# Patient Record
Sex: FEMALE | Race: WHITE | NOT HISPANIC OR LATINO | URBAN - METROPOLITAN AREA
[De-identification: names, ages, dates, MRNs, and addresses within clinical notes are randomized per-mention and may not be internally consistent; named-entity substitution may affect disease eponyms.]

---

## 2022-03-24 ENCOUNTER — INPATIENT (INPATIENT)
Facility: HOSPITAL | Age: 67
LOS: 2 days | Discharge: HOME | DRG: 87 | End: 2022-03-27
Attending: SURGERY | Admitting: SURGERY
Payer: MEDICARE

## 2022-03-24 VITALS
OXYGEN SATURATION: 98 % | HEIGHT: 68 IN | SYSTOLIC BLOOD PRESSURE: 126 MMHG | RESPIRATION RATE: 20 BRPM | TEMPERATURE: 99 F | WEIGHT: 169.76 LBS | HEART RATE: 58 BPM | DIASTOLIC BLOOD PRESSURE: 76 MMHG

## 2022-03-24 LAB
ALBUMIN SERPL ELPH-MCNC: 3.9 G/DL — SIGNIFICANT CHANGE UP (ref 3.3–5.2)
ALP SERPL-CCNC: 81 U/L — SIGNIFICANT CHANGE UP (ref 40–120)
ALT FLD-CCNC: 48 U/L — HIGH
ANION GAP SERPL CALC-SCNC: 16 MMOL/L — SIGNIFICANT CHANGE UP (ref 5–17)
APTT BLD: 30.5 SEC — SIGNIFICANT CHANGE UP (ref 27.5–35.5)
AST SERPL-CCNC: 41 U/L — HIGH
BASOPHILS # BLD AUTO: 0.04 K/UL — SIGNIFICANT CHANGE UP (ref 0–0.2)
BASOPHILS NFR BLD AUTO: 0.4 % — SIGNIFICANT CHANGE UP (ref 0–2)
BILIRUB SERPL-MCNC: <0.2 MG/DL — LOW (ref 0.4–2)
BUN SERPL-MCNC: 18.4 MG/DL — SIGNIFICANT CHANGE UP (ref 8–20)
CALCIUM SERPL-MCNC: 9.2 MG/DL — SIGNIFICANT CHANGE UP (ref 8.6–10.2)
CHLORIDE SERPL-SCNC: 104 MMOL/L — SIGNIFICANT CHANGE UP (ref 98–107)
CO2 SERPL-SCNC: 21 MMOL/L — LOW (ref 22–29)
CREAT SERPL-MCNC: 0.61 MG/DL — SIGNIFICANT CHANGE UP (ref 0.5–1.3)
EGFR: 97 ML/MIN/1.73M2 — SIGNIFICANT CHANGE UP
EOSINOPHIL # BLD AUTO: 0.53 K/UL — HIGH (ref 0–0.5)
EOSINOPHIL NFR BLD AUTO: 5.7 % — SIGNIFICANT CHANGE UP (ref 0–6)
ETHANOL SERPL-MCNC: 66 MG/DL — HIGH (ref 0–9)
GLUCOSE SERPL-MCNC: 116 MG/DL — HIGH (ref 70–99)
HCT VFR BLD CALC: 40.1 % — SIGNIFICANT CHANGE UP (ref 34.5–45)
HGB BLD-MCNC: 12.8 G/DL — SIGNIFICANT CHANGE UP (ref 11.5–15.5)
IMM GRANULOCYTES NFR BLD AUTO: 1.5 % — SIGNIFICANT CHANGE UP (ref 0–1.5)
INR BLD: 1.07 RATIO — SIGNIFICANT CHANGE UP (ref 0.88–1.16)
LACTATE SERPL-SCNC: 2.4 MMOL/L — HIGH (ref 0.5–2)
LIDOCAIN IGE QN: 54 U/L — HIGH (ref 22–51)
LYMPHOCYTES # BLD AUTO: 2.02 K/UL — SIGNIFICANT CHANGE UP (ref 1–3.3)
LYMPHOCYTES # BLD AUTO: 21.8 % — SIGNIFICANT CHANGE UP (ref 13–44)
MCHC RBC-ENTMCNC: 30.9 PG — SIGNIFICANT CHANGE UP (ref 27–34)
MCHC RBC-ENTMCNC: 31.9 GM/DL — LOW (ref 32–36)
MCV RBC AUTO: 96.9 FL — SIGNIFICANT CHANGE UP (ref 80–100)
MONOCYTES # BLD AUTO: 0.65 K/UL — SIGNIFICANT CHANGE UP (ref 0–0.9)
MONOCYTES NFR BLD AUTO: 7 % — SIGNIFICANT CHANGE UP (ref 2–14)
NEUTROPHILS # BLD AUTO: 5.89 K/UL — SIGNIFICANT CHANGE UP (ref 1.8–7.4)
NEUTROPHILS NFR BLD AUTO: 63.6 % — SIGNIFICANT CHANGE UP (ref 43–77)
PLATELET # BLD AUTO: 253 K/UL — SIGNIFICANT CHANGE UP (ref 150–400)
POTASSIUM SERPL-MCNC: 4 MMOL/L — SIGNIFICANT CHANGE UP (ref 3.5–5.3)
POTASSIUM SERPL-SCNC: 4 MMOL/L — SIGNIFICANT CHANGE UP (ref 3.5–5.3)
PROT SERPL-MCNC: 7 G/DL — SIGNIFICANT CHANGE UP (ref 6.6–8.7)
PROTHROM AB SERPL-ACNC: 12.4 SEC — SIGNIFICANT CHANGE UP (ref 10.5–13.4)
RBC # BLD: 4.14 M/UL — SIGNIFICANT CHANGE UP (ref 3.8–5.2)
RBC # FLD: 13.4 % — SIGNIFICANT CHANGE UP (ref 10.3–14.5)
SODIUM SERPL-SCNC: 141 MMOL/L — SIGNIFICANT CHANGE UP (ref 135–145)
WBC # BLD: 9.27 K/UL — SIGNIFICANT CHANGE UP (ref 3.8–10.5)
WBC # FLD AUTO: 9.27 K/UL — SIGNIFICANT CHANGE UP (ref 3.8–10.5)

## 2022-03-24 PROCEDURE — 71045 X-RAY EXAM CHEST 1 VIEW: CPT | Mod: 26

## 2022-03-24 PROCEDURE — 74177 CT ABD & PELVIS W/CONTRAST: CPT | Mod: 26,MA

## 2022-03-24 PROCEDURE — 70450 CT HEAD/BRAIN W/O DYE: CPT | Mod: 26,MA

## 2022-03-24 PROCEDURE — 71260 CT THORAX DX C+: CPT | Mod: 26,MA

## 2022-03-24 PROCEDURE — 99285 EMERGENCY DEPT VISIT HI MDM: CPT

## 2022-03-24 PROCEDURE — 72170 X-RAY EXAM OF PELVIS: CPT | Mod: 26

## 2022-03-24 PROCEDURE — 72125 CT NECK SPINE W/O DYE: CPT | Mod: 26,MA

## 2022-03-24 RX ORDER — CEFAZOLIN SODIUM 1 G
2000 VIAL (EA) INJECTION ONCE
Refills: 0 | Status: COMPLETED | OUTPATIENT
Start: 2022-03-24 | End: 2022-03-24

## 2022-03-24 RX ORDER — SODIUM CHLORIDE 9 MG/ML
250 INJECTION INTRAMUSCULAR; INTRAVENOUS; SUBCUTANEOUS ONCE
Refills: 0 | Status: COMPLETED | OUTPATIENT
Start: 2022-03-24 | End: 2022-03-24

## 2022-03-24 RX ORDER — TETANUS TOXOID, REDUCED DIPHTHERIA TOXOID AND ACELLULAR PERTUSSIS VACCINE, ADSORBED 5; 2.5; 8; 8; 2.5 [IU]/.5ML; [IU]/.5ML; UG/.5ML; UG/.5ML; UG/.5ML
0.5 SUSPENSION INTRAMUSCULAR ONCE
Refills: 0 | Status: COMPLETED | OUTPATIENT
Start: 2022-03-24 | End: 2022-03-24

## 2022-03-24 RX ADMIN — TETANUS TOXOID, REDUCED DIPHTHERIA TOXOID AND ACELLULAR PERTUSSIS VACCINE, ADSORBED 0.5 MILLILITER(S): 5; 2.5; 8; 8; 2.5 SUSPENSION INTRAMUSCULAR at 23:00

## 2022-03-24 RX ADMIN — Medication 100 MILLIGRAM(S): at 22:42

## 2022-03-24 NOTE — ED ADULT NURSE NOTE - NS ED NURSE REPORT GIVEN TO FT
"I drop a wooden plank on my foot,  I went to an urgent care and they said my toe is broken" DONNA Whalen

## 2022-03-24 NOTE — ED ADULT TRIAGE NOTE - CHIEF COMPLAINT QUOTE
Patient BIBEMS s/p trip and fall down a flight of stairs this evening. Patient did not lose consciousness, GCS 15 at this time. ETOH use this evening. Code trauma B activated.

## 2022-03-25 DIAGNOSIS — Z98.890 OTHER SPECIFIED POSTPROCEDURAL STATES: Chronic | ICD-10-CM

## 2022-03-25 DIAGNOSIS — S06.6X9A TRAUMATIC SUBARACHNOID HEMORRHAGE WITH LOSS OF CONSCIOUSNESS OF UNSPECIFIED DURATION, INITIAL ENCOUNTER: ICD-10-CM

## 2022-03-25 LAB
ANION GAP SERPL CALC-SCNC: 13 MMOL/L — SIGNIFICANT CHANGE UP (ref 5–17)
APPEARANCE UR: CLEAR — SIGNIFICANT CHANGE UP
BILIRUB UR-MCNC: NEGATIVE — SIGNIFICANT CHANGE UP
BLD GP AB SCN SERPL QL: SIGNIFICANT CHANGE UP
BUN SERPL-MCNC: 17.4 MG/DL — SIGNIFICANT CHANGE UP (ref 8–20)
CALCIUM SERPL-MCNC: 8.1 MG/DL — LOW (ref 8.6–10.2)
CHLORIDE SERPL-SCNC: 105 MMOL/L — SIGNIFICANT CHANGE UP (ref 98–107)
CO2 SERPL-SCNC: 24 MMOL/L — SIGNIFICANT CHANGE UP (ref 22–29)
COLOR SPEC: YELLOW — SIGNIFICANT CHANGE UP
CREAT SERPL-MCNC: 0.58 MG/DL — SIGNIFICANT CHANGE UP (ref 0.5–1.3)
DIFF PNL FLD: NEGATIVE — SIGNIFICANT CHANGE UP
EGFR: 99 ML/MIN/1.73M2 — SIGNIFICANT CHANGE UP
GLUCOSE BLDC GLUCOMTR-MCNC: 128 MG/DL — HIGH (ref 70–99)
GLUCOSE SERPL-MCNC: 169 MG/DL — HIGH (ref 70–99)
GLUCOSE UR QL: 50 MG/DL
HCT VFR BLD CALC: 29 % — LOW (ref 34.5–45)
HCT VFR BLD CALC: 31.9 % — LOW (ref 34.5–45)
HGB BLD-MCNC: 10.2 G/DL — LOW (ref 11.5–15.5)
HGB BLD-MCNC: 9.4 G/DL — LOW (ref 11.5–15.5)
KETONES UR-MCNC: NEGATIVE — SIGNIFICANT CHANGE UP
LACTATE SERPL-SCNC: 2 MMOL/L — SIGNIFICANT CHANGE UP (ref 0.5–2)
LEUKOCYTE ESTERASE UR-ACNC: NEGATIVE — SIGNIFICANT CHANGE UP
MAGNESIUM SERPL-MCNC: 1.6 MG/DL — SIGNIFICANT CHANGE UP (ref 1.6–2.6)
MCHC RBC-ENTMCNC: 30.9 PG — SIGNIFICANT CHANGE UP (ref 27–34)
MCHC RBC-ENTMCNC: 32 GM/DL — SIGNIFICANT CHANGE UP (ref 32–36)
MCV RBC AUTO: 96.7 FL — SIGNIFICANT CHANGE UP (ref 80–100)
MRSA PCR RESULT.: SIGNIFICANT CHANGE UP
NITRITE UR-MCNC: NEGATIVE — SIGNIFICANT CHANGE UP
PH UR: 6 — SIGNIFICANT CHANGE UP (ref 5–8)
PHOSPHATE SERPL-MCNC: 2.6 MG/DL — SIGNIFICANT CHANGE UP (ref 2.4–4.7)
PLATELET # BLD AUTO: 249 K/UL — SIGNIFICANT CHANGE UP (ref 150–400)
POTASSIUM SERPL-MCNC: 3.7 MMOL/L — SIGNIFICANT CHANGE UP (ref 3.5–5.3)
POTASSIUM SERPL-SCNC: 3.7 MMOL/L — SIGNIFICANT CHANGE UP (ref 3.5–5.3)
PROT UR-MCNC: NEGATIVE — SIGNIFICANT CHANGE UP
RBC # BLD: 3.3 M/UL — LOW (ref 3.8–5.2)
RBC # FLD: 13.6 % — SIGNIFICANT CHANGE UP (ref 10.3–14.5)
S AUREUS DNA NOSE QL NAA+PROBE: SIGNIFICANT CHANGE UP
SARS-COV-2 RNA SPEC QL NAA+PROBE: SIGNIFICANT CHANGE UP
SODIUM SERPL-SCNC: 142 MMOL/L — SIGNIFICANT CHANGE UP (ref 135–145)
SP GR SPEC: 1.01 — SIGNIFICANT CHANGE UP (ref 1.01–1.02)
UROBILINOGEN FLD QL: NEGATIVE MG/DL — SIGNIFICANT CHANGE UP
WBC # BLD: 8.25 K/UL — SIGNIFICANT CHANGE UP (ref 3.8–10.5)
WBC # FLD AUTO: 8.25 K/UL — SIGNIFICANT CHANGE UP (ref 3.8–10.5)

## 2022-03-25 PROCEDURE — 99221 1ST HOSP IP/OBS SF/LOW 40: CPT

## 2022-03-25 PROCEDURE — 70450 CT HEAD/BRAIN W/O DYE: CPT | Mod: 26

## 2022-03-25 PROCEDURE — 99222 1ST HOSP IP/OBS MODERATE 55: CPT

## 2022-03-25 PROCEDURE — 93010 ELECTROCARDIOGRAM REPORT: CPT

## 2022-03-25 PROCEDURE — 70486 CT MAXILLOFACIAL W/O DYE: CPT | Mod: 26

## 2022-03-25 RX ORDER — MORPHINE SULFATE 50 MG/1
2 CAPSULE, EXTENDED RELEASE ORAL ONCE
Refills: 0 | Status: DISCONTINUED | OUTPATIENT
Start: 2022-03-25 | End: 2022-03-25

## 2022-03-25 RX ORDER — MAGNESIUM SULFATE 500 MG/ML
2 VIAL (ML) INJECTION ONCE
Refills: 0 | Status: COMPLETED | OUTPATIENT
Start: 2022-03-25 | End: 2022-03-25

## 2022-03-25 RX ORDER — SODIUM CHLORIDE 9 MG/ML
500 INJECTION INTRAMUSCULAR; INTRAVENOUS; SUBCUTANEOUS ONCE
Refills: 0 | Status: COMPLETED | OUTPATIENT
Start: 2022-03-25 | End: 2022-03-25

## 2022-03-25 RX ORDER — LIDOCAINE HYDROCHLORIDE AND EPINEPHRINE 10; 10 MG/ML; UG/ML
10 INJECTION, SOLUTION INFILTRATION; PERINEURAL ONCE
Refills: 0 | Status: COMPLETED | OUTPATIENT
Start: 2022-03-25 | End: 2022-03-25

## 2022-03-25 RX ORDER — TETANUS TOXOID, REDUCED DIPHTHERIA TOXOID AND ACELLULAR PERTUSSIS VACCINE, ADSORBED 5; 2.5; 8; 8; 2.5 [IU]/.5ML; [IU]/.5ML; UG/.5ML; UG/.5ML; UG/.5ML
0.5 SUSPENSION INTRAMUSCULAR ONCE
Refills: 0 | Status: DISCONTINUED | OUTPATIENT
Start: 2022-03-25 | End: 2022-03-25

## 2022-03-25 RX ORDER — ENOXAPARIN SODIUM 100 MG/ML
40 INJECTION SUBCUTANEOUS EVERY 24 HOURS
Refills: 0 | Status: DISCONTINUED | OUTPATIENT
Start: 2022-03-25 | End: 2022-03-27

## 2022-03-25 RX ORDER — LIDOCAINE HCL 20 MG/ML
10 VIAL (ML) INJECTION ONCE
Refills: 0 | Status: COMPLETED | OUTPATIENT
Start: 2022-03-25 | End: 2022-03-25

## 2022-03-25 RX ORDER — LORATADINE 10 MG/1
1 TABLET ORAL
Qty: 0 | Refills: 0 | DISCHARGE

## 2022-03-25 RX ORDER — CEFAZOLIN SODIUM 1 G
2000 VIAL (EA) INJECTION ONCE
Refills: 0 | Status: DISCONTINUED | OUTPATIENT
Start: 2022-03-25 | End: 2022-03-25

## 2022-03-25 RX ORDER — FLUTICASONE PROPIONATE 50 MCG
1 SPRAY, SUSPENSION NASAL
Qty: 0 | Refills: 0 | DISCHARGE

## 2022-03-25 RX ORDER — POTASSIUM CHLORIDE 20 MEQ
20 PACKET (EA) ORAL ONCE
Refills: 0 | Status: COMPLETED | OUTPATIENT
Start: 2022-03-25 | End: 2022-03-25

## 2022-03-25 RX ORDER — SODIUM CHLORIDE 9 MG/ML
1000 INJECTION INTRAMUSCULAR; INTRAVENOUS; SUBCUTANEOUS
Refills: 0 | Status: DISCONTINUED | OUTPATIENT
Start: 2022-03-25 | End: 2022-03-25

## 2022-03-25 RX ORDER — TETANUS TOXOID, REDUCED DIPHTHERIA TOXOID AND ACELLULAR PERTUSSIS VACCINE, ADSORBED 5; 2.5; 8; 8; 2.5 [IU]/.5ML; [IU]/.5ML; UG/.5ML; UG/.5ML; UG/.5ML
0.5 SUSPENSION INTRAMUSCULAR ONCE
Refills: 0 | Status: COMPLETED | OUTPATIENT
Start: 2022-03-25 | End: 2022-03-25

## 2022-03-25 RX ORDER — POTASSIUM PHOSPHATE, MONOBASIC POTASSIUM PHOSPHATE, DIBASIC 236; 224 MG/ML; MG/ML
15 INJECTION, SOLUTION INTRAVENOUS ONCE
Refills: 0 | Status: COMPLETED | OUTPATIENT
Start: 2022-03-25 | End: 2022-03-25

## 2022-03-25 RX ORDER — ACETAMINOPHEN 500 MG
650 TABLET ORAL EVERY 6 HOURS
Refills: 0 | Status: DISCONTINUED | OUTPATIENT
Start: 2022-03-25 | End: 2022-03-26

## 2022-03-25 RX ORDER — CHLORHEXIDINE GLUCONATE 213 G/1000ML
1 SOLUTION TOPICAL
Refills: 0 | Status: DISCONTINUED | OUTPATIENT
Start: 2022-03-25 | End: 2022-03-25

## 2022-03-25 RX ADMIN — POTASSIUM PHOSPHATE, MONOBASIC POTASSIUM PHOSPHATE, DIBASIC 62.5 MILLIMOLE(S): 236; 224 INJECTION, SOLUTION INTRAVENOUS at 06:16

## 2022-03-25 RX ADMIN — CHLORHEXIDINE GLUCONATE 1 APPLICATION(S): 213 SOLUTION TOPICAL at 06:13

## 2022-03-25 RX ADMIN — SODIUM CHLORIDE 500 MILLILITER(S): 9 INJECTION INTRAMUSCULAR; INTRAVENOUS; SUBCUTANEOUS at 03:30

## 2022-03-25 RX ADMIN — Medication 20 MILLIEQUIVALENT(S): at 06:17

## 2022-03-25 RX ADMIN — Medication 10 MILLILITER(S): at 01:36

## 2022-03-25 RX ADMIN — Medication 650 MILLIGRAM(S): at 12:00

## 2022-03-25 RX ADMIN — SODIUM CHLORIDE 250 MILLILITER(S): 9 INJECTION INTRAMUSCULAR; INTRAVENOUS; SUBCUTANEOUS at 00:06

## 2022-03-25 RX ADMIN — Medication 650 MILLIGRAM(S): at 17:01

## 2022-03-25 RX ADMIN — SODIUM CHLORIDE 100 MILLILITER(S): 9 INJECTION INTRAMUSCULAR; INTRAVENOUS; SUBCUTANEOUS at 04:48

## 2022-03-25 RX ADMIN — Medication 25 GRAM(S): at 06:17

## 2022-03-25 RX ADMIN — MORPHINE SULFATE 2 MILLIGRAM(S): 50 CAPSULE, EXTENDED RELEASE ORAL at 21:37

## 2022-03-25 RX ADMIN — Medication 650 MILLIGRAM(S): at 06:13

## 2022-03-25 RX ADMIN — Medication 650 MILLIGRAM(S): at 05:13

## 2022-03-25 RX ADMIN — TETANUS TOXOID, REDUCED DIPHTHERIA TOXOID AND ACELLULAR PERTUSSIS VACCINE, ADSORBED 0.5 MILLILITER(S): 5; 2.5; 8; 8; 2.5 SUSPENSION INTRAMUSCULAR at 17:01

## 2022-03-25 RX ADMIN — Medication 650 MILLIGRAM(S): at 11:54

## 2022-03-25 RX ADMIN — LIDOCAINE HYDROCHLORIDE AND EPINEPHRINE 10 MILLILITER(S): 10; 10 INJECTION, SOLUTION INFILTRATION; PERINEURAL at 01:36

## 2022-03-25 NOTE — H&P ADULT - HISTORY OF PRESENT ILLNESS
66yoF with PMH of arthritis BIBEMS after fall down flight of stairs. She is visiting her son locally and was trying to go to the bathroom, but opened the door into the basement and fell down a flight of stairs. Did not lose consciousness. Has c-collar in place and laceration to posterior scalp which was dressed by EMS. Does not take any blood thinners. Reports only pain in head. Also has chronic back pain which feels about the same. No nausea, vomiting, fever, chills, chest pain, or SOB. Does report taking ibuprofen regularly for back pain    A: Protected, patient conversating  B: CTAB. Symmetrical chest rise  C: 2+ central (femoral) & peripheral pulses (Radial, DP)  D: GCS 15,, interacting. No thierry disability noted  E: No gross deformities on primary exposure    Vitals:  Temp: 98.7   HR: 58  BP: 126/76  RR: 20   SpO2: 98%    CXR: Negative for evidence of hemo/pneumothorax     66yoF with PMH of arthritis BIBEMS after fall down flight of stairs. She is visiting her son locally and was trying to go to the bathroom, but opened the door into the basement and fell down a flight of stairs. Did not lose consciousness. Has c-collar in place and laceration to posterior scalp which was dressed by EMS. Does not take any blood thinners. Reports only pain in head. Also has chronic back pain which feels about the same. No nausea, vomiting, fever, chills, chest pain, or SOB. Does report taking ibuprofen regularly for back pain, about 400mg every 6 hours for "some time now."    A: Protected, patient conversating  B: CTAB. Symmetrical chest rise  C: 2+ central (femoral) & peripheral pulses (Radial, DP)  D: GCS 15,, interacting. No thierry disability noted  E: No gross deformities on primary exposure    Vitals:  Temp: 98.7   HR: 58  BP: 126/76  RR: 20   SpO2: 98%    CXR: Negative for evidence of hemo/pneumothorax

## 2022-03-25 NOTE — ED PROVIDER NOTE - ATTENDING CONTRIBUTION TO CARE
Fell down flight of stairs with head trauma, GCS15, has small SAH and IPH, also with flank hematoma with concern for active bleeding. Will be admitted to SICU for management.

## 2022-03-25 NOTE — PROCEDURE NOTE - ADDITIONAL PROCEDURE DETAILS
Patient advised to keep area dry with gauze and kerlix bandage for pressure.    Relayed to patient that she has 9 sutures to be removed in her home state [facility] in 10-14 days.  Further fu information to be given upon discharge.  Patient will be admitted to SICU and repeat head CT in AM.

## 2022-03-25 NOTE — ED PROVIDER NOTE - CLINICAL SUMMARY MEDICAL DECISION MAKING FREE TEXT BOX
66y F presenting as Code Trauma B for fall down steps with head injury. Scalp laceration repaired by trauma team. Ancef, tetanus. Neurosurgery consulted for SAH. Admit to SICU.

## 2022-03-25 NOTE — H&P ADULT - ASSESSMENT
67yo F w PMH of arthritis who presents after fall down stairs, found with posterior scalp laceration and preliminary read for SAH. Clinically appears well and hemodynamically stable.     #Scalp lac  - Quick repair for hemostasis prior to CT scan. To be formally repaired.   - Keron España  - F/u imaging final reads  - Additional plans to follow    #SAH  - Neurosurgery consulted      Patient evaluated with Dr. Colvin during Trauma activation.  67yo F w PMH of arthritis who presents after fall down stairs, found with posterior scalp laceration and preliminary read for SAH. Clinically appears well and hemodynamically stable.     #SAH  - Admit to SICU under Trauma for Dr. Colvin  - Neurosurgery consulted- rec repeat Head CT in 6 hours  - q1 neurochecks  - NPO except sips  - IVF per SICU  - Pain control PRN    #Scalp lac  - Quick repair for hemostasis prior to CT scan, now s/p formal repair  - Adacel, Ancef    #Elevate lactate, EtOH levels  - IVF per SICU  - Repeat AM labs  - SBIRT      Patient evaluated with Dr. Colvin during Trauma activation who agrees with plan.

## 2022-03-25 NOTE — CHART NOTE - NSCHARTNOTEFT_GEN_A_CORE
66yoF with PMH of arthritis BIBEMS after fall down flight of stairs. She is visiting her son locally and was trying to go to the bathroom, but opened the door into the basement and fell down a flight of stairs. Did not lose consciousness. Sustained laceration to posterior scalp which was dressed by EMS. Does not take any blood thinners. Neurosurgery consulted for CTH findings of small foci of SAH and IPH in L frontoparietal lobe. Repeat CTH shows no evidence of acute hemorrhage.       Radiology:  CT Head No Cont (03.25.22 @ 09:19):  IMPRESSION:  CT head:   Mild chronic microvascular changes without evidence of an acute   transcortical infarction or hemorrhage.  CT Face: Severe paranasal sinus disease with nasal polyposis.   Differential diagnosis includes fungal sinusitis. ENT consultation is   recommended. Additional findings above. No acute facial bone fracture.    CT Head No Cont (03.24.22 @ 23:42):  IMPRESSION:  CT BRAIN:  Motion limited study.  Small foci of subarachnoid and intraparenchymal hemorrhage involving the   left frontoparietal lobes as described.  Extensive pan sinusitis containing high density material which may   represent hemorrhage or fungal infection.    CT CERVICAL SPINE:  No acute cervical fracture or traumatic malalignment.  MRI would be required to evaluate the ligamentous structures at higher   sensitivity as well as for better evaluation of the cervical canal and   its contents.    -D/w attending Dr. Garcia   -All imaging reviewed   -No acute neurosurgical intervention indicated at this time   -Signing off, please reconsult PRN 66yoF with PMH of arthritis BIBEMS after fall down flight of stairs. She is visiting her son locally and was trying to go to the bathroom, but opened the door into the basement and fell down a flight of stairs. Did not lose consciousness. Sustained laceration to posterior scalp which was dressed by EMS. Does not take any blood thinners. Neurosurgery consulted for CTH findings of small foci of SAH and IPH in L frontoparietal lobe. Repeat CTH shows no evidence of acute hemorrhage.     -D/w attending Dr. Garcia   -All imaging reviewed   -No acute neurosurgical intervention indicated at this time   -No acute neurosurgical contraindication to begin Lovenox for DVT prophylaxis tomorrow 3/26  -Signing off, please reconsult PRN    Radiology:  CT Head No Cont (03.25.22 @ 09:19):  IMPRESSION:  CT head:   Mild chronic microvascular changes without evidence of an acute   transcortical infarction or hemorrhage.  CT Face: Severe paranasal sinus disease with nasal polyposis.   Differential diagnosis includes fungal sinusitis. ENT consultation is   recommended. Additional findings above. No acute facial bone fracture.    CT Head No Cont (03.24.22 @ 23:42):  IMPRESSION:  CT BRAIN:  Motion limited study.  Small foci of subarachnoid and intraparenchymal hemorrhage involving the   left frontoparietal lobes as described.  Extensive pan sinusitis containing high density material which may   represent hemorrhage or fungal infection.    CT CERVICAL SPINE:  No acute cervical fracture or traumatic malalignment.  MRI would be required to evaluate the ligamentous structures at higher   sensitivity as well as for better evaluation of the cervical canal and   its contents.

## 2022-03-25 NOTE — CONSULT NOTE ADULT - SUBJECTIVE AND OBJECTIVE BOX
66F visiting family and lost her footing on stairway to the basement and struck the back of her head - LOC not on any anticoagulation nor antiplatelet meds.  Denies h/a N V photophobia.      PMH back pain  Meds Advil flonase  All NKDA  Psurg ovarian cyst    CTH shows calcification posterior left frontal lobe at the vertex with tiny traumatic SAH   CT C/spine - no fractures    AVSS  head laceration under repair  A&Ox3 EOMI smile symmetrical tongue midline   M5/5 UE LE b/l

## 2022-03-25 NOTE — ED PROVIDER NOTE - CARE PLAN
Principal Discharge DX:	Traumatic subarachnoid hemorrhage  Secondary Diagnosis:	Hematoma of left flank   1

## 2022-03-25 NOTE — PROCEDURE NOTE - NSICDXPROCEDURE_GEN_ALL_CORE_FT
PROCEDURES:  Complex repair of laceration of scalp, 2.6 cm to 7.5 cm 25-Mar-2022 03:13:52  Gretta Rodrigues

## 2022-03-25 NOTE — CONSULT NOTE ADULT - ASSESSMENT
Imp traumatic SAH, intracranial calcification    Plan observe, neuro checks, repeat head CT in 6h - earlier if MS decline    will d/w Attg

## 2022-03-25 NOTE — ED PROVIDER NOTE - NS ED ROS FT
Constitutional: no fever, no chills  Eyes: no vision changes  ENT: no nasal congestion, no sore throat  CV: no chest pain  Resp: no cough, no shortness of breath  GI: no abdominal pain, no vomiting, no diarrhea  : no dysuria  MSK: no joint pain  Skin: no rash  Neuro: +headache, no focal weakness, no paresthesias

## 2022-03-25 NOTE — ED PROVIDER NOTE - OBJECTIVE STATEMENT
66y F w/ no significant PMH, BIBEMS for trauma. Pt states that she fell down a flight of ~8 steps just prior to arrival, hitting her head. No LOC. Now complaining of pain/laceration to L occipital area. No other complaints. Last tetanus unknown. Code Trauma B activated on arrival.

## 2022-03-25 NOTE — H&P ADULT - NSHPPHYSICALEXAM_GEN_ALL_CORE
Constitutional:  Elderly, very pleasant female in no acute distress  HEENT: Head is normocephalic, complex posterior scalp laceration stellate about [id number] deep.  Maxillofacial structures stable, no blood or discharge from nares or oral cavity, no nugent sign / raccoon eyes, EOMI b/l, pupils 4 mm round and reactive to light b/l, no active drainage or redness  Neck: Full ROM, trachea midline, Supple  Respiratory: Breath sounds CTA b/l respirations are unlabored, no accessory muscle use, no conversational dyspnea  Cardiovascular: Regular rate & rhythm, +S1, S2  Chest: Chest wall is non-tender to palpation, no subQ emphysema or crepitus palpated  Gastrointestinal: Abdomen soft, non-tender, non-distended, no rebound tenderness / guarding, no ecchymosis or external signs of abdominal trauma  Extremities: moving all extremities spontaneously, left elbow abrasion. no other point tenderness or deformity noted to upper or lower extremities b/l  Pelvis: stable  Vascular: 2+ radial, femoral, and DP pulses b/l  Neurological: GCS: 15 (4/5/6). A&O x 3; no gross sensory / motor / coordination deficits  Musculoskeletal: 5/5 strength of upper and lower extremities b/l  Back: no C/T/LS spine tenderness to palpation, no step-offs or signs of external trauma to the back Constitutional:  Elderly, very pleasant female in no acute distress  HEENT: Head is normocephalic, complex posterior scalp laceration stellate about [id number] deep.  Maxillofacial structures stable, no blood or discharge from nares or oral cavity, no nugent sign / raccoon eyes, EOMI b/l, pupils 4 mm round and reactive to light b/l, no active drainage or redness  Neck: Full ROM, trachea midline, Supple  Respiratory: Breath sounds CTA b/l respirations are unlabored, no accessory muscle use, no conversational dyspnea  Cardiovascular: Regular rate & rhythm, +S1, S2  Chest: Chest wall is non-tender to palpation, no subQ emphysema or crepitus palpated  Gastrointestinal: Abdomen soft, non-tender, non-distended, no rebound tenderness / guarding, no ecchymosis or external signs of abdominal trauma  Extremities: moving all extremities spontaneously, left elbow abrasion. no other point tenderness or deformity noted to upper or lower extremities b/l  Pelvis: stable  Vascular: 2+ radial, femoral, and DP pulses b/l  Neurological: GCS: 15 (4/5/6). A&O x 3; no gross sensory / motor / coordination deficits  Musculoskeletal: 5/5 strength of upper and lower extremities b/l  Back: Left flank ecchymosis with swelling and small posterior abrasion. No C/T/LS spine tenderness to palpation, no step-offs or signs of external trauma to the back

## 2022-03-25 NOTE — H&P ADULT - NSHPLABSRESULTS_GEN_ALL_CORE
Vital Signs Last 24 Hrs  T(C): 37.1 (24 Mar 2022 22:45), Max: 37.1 (24 Mar 2022 22:45)  T(F): 98.7 (24 Mar 2022 22:45), Max: 98.7 (24 Mar 2022 22:45)  HR: 63 (24 Mar 2022 23:10) (58 - 76)  BP: 147/84 (24 Mar 2022 23:10) (126/76 - 151/70)  BP(mean): --  RR: 18 (24 Mar 2022 23:10) (18 - 20)  SpO2: 98% (24 Mar 2022 23:10) (96% - 98%)        LABS:                        12.8   9.27  )-----------( 253      ( 24 Mar 2022 23:11 )             40.1     03-24    141  |  104  |  18.4  ----------------------------<  116<H>  4.0   |  21.0<L>  |  0.61    Ca    9.2      24 Mar 2022 23:11    TPro  7.0  /  Alb  3.9  /  TBili  <0.2<L>  /  DBili  x   /  AST  41<H>  /  ALT  48<H>  /  AlkPhos  81  03-24    PT/INR - ( 24 Mar 2022 23:21 )   PT: 12.4 sec;   INR: 1.07 ratio       PTT - ( 24 Mar 2022 23:21 )  PTT:30.5 sec      IMAGING:  CT Head / C-spine  IMPRESSION:  CT BRAIN:  Motion limited study.  Small foci of subarachnoid and intraparenchymal hemorrhage involving the left frontoparietal lobes as described.  Extensive pan sinusitis containing high density material which may represent hemorrhage or fungal infection.    CT CERVICAL SPINE:  No acute cervical fracture or traumatic malalignment.      CLEMENTE DAVENPORT MD; Attending Radiologist  This document has been electronically signed. Mar 25 2022 12:13AM      CT Ch/A/P pending final read Vital Signs Last 24 Hrs  T(C): 37.1 (24 Mar 2022 22:45), Max: 37.1 (24 Mar 2022 22:45)  T(F): 98.7 (24 Mar 2022 22:45), Max: 98.7 (24 Mar 2022 22:45)  HR: 63 (24 Mar 2022 23:10) (58 - 76)  BP: 147/84 (24 Mar 2022 23:10) (126/76 - 151/70)  BP(mean): --  RR: 18 (24 Mar 2022 23:10) (18 - 20)  SpO2: 98% (24 Mar 2022 23:10) (96% - 98%)        LABS:                        12.8   9.27  )-----------( 253      ( 24 Mar 2022 23:11 )             40.1     03-24    141  |  104  |  18.4  ----------------------------<  116<H>  4.0   |  21.0<L>  |  0.61    Ca    9.2      24 Mar 2022 23:11    TPro  7.0  /  Alb  3.9  /  TBili  <0.2<L>  /  DBili  x   /  AST  41<H>  /  ALT  48<H>  /  AlkPhos  81  03-24    PT/INR - ( 24 Mar 2022 23:21 )   PT: 12.4 sec;   INR: 1.07 ratio       PTT - ( 24 Mar 2022 23:21 )  PTT:30.5 sec      IMAGING:  CT Head / C-spine  IMPRESSION:  CT BRAIN:  Motion limited study.  Small foci of subarachnoid and intraparenchymal hemorrhage involving the left frontoparietal lobes as described.  Extensive pan sinusitis containing high density material which may represent hemorrhage or fungal infection.    CT CERVICAL SPINE:  No acute cervical fracture or traumatic malalignment.      CLEMENTE DAVENPORT MD; Attending Radiologist  This document has been electronically signed. Mar 25 2022 12:13AM      CT Ch/A/P:  IMPRESSION:  No evidence for acute thoracic trauma.  Subcutaneous left flank contusion and hematoma with small foci of active hemorrhage.    RADHA ALLEN MD; Attending Radiologist  This document has been electronically signed. Mar 25 2022 1:08AM

## 2022-03-25 NOTE — ED PROVIDER NOTE - PHYSICAL EXAMINATION
Constitutional: Awake, alert, in no acute distress  Eyes: PERRL  HENT: +laceration and hematoma to L occipital scalp, no facial tenderness, airway patent  Neck: no cervical spine tenderness, no palpable stepoff, no tracheal deviation  CV: no chest wall tenderness, no crepitus or subcutaneous emphysema.  RRR, no murmur, 2+ distal pulses in all extremities  Pulm: non-labored respirations, CTAB  Abdomen: soft, non-tender, non-distended, no ecchymosis  Back: no spinal tenderness, no palpable stepoff  Extremities: stable pelvis, no extremity tenderness or deformity  Skin: no rash, +scattered superficial abrasions to L arm and L buttock  Neuro: AAOx3, GCS 15, moving all extremities equally, no focal neurologic deficit

## 2022-03-25 NOTE — H&P ADULT - ATTENDING COMMENTS
I have seen and examined the patient,   Trauma team activation B    Fall downstairs.    on arrival  ABCD intact  HD normal  GCS 15  occipital stellate lacerations  abd soft  no gross deformities.    CXR NO PTX  Pan scanned: L PF tSAH IP hemorrhage, subcutaneous hematoma left flank.      A/P  Fall tSAH, IpH   Admit to SICU  repeat CT in 4-6 hrs  Neurosurgerical consult  Suture laceration

## 2022-03-25 NOTE — CHART NOTE - NSCHARTNOTEFT_GEN_A_CORE
SICU TRANSFER NOTE  -----------------------------  ICU Admission Date: 3/25/2022  Transfer Date: 03-25-22 @ 14:43    Admission Diagnosis:  SAH, Left flank hematoma, scalp laceration    Active Problems/injuries:  SAH, Left flank hematoma, nasal polyposis, scalp laceration    Procedures:  Scalp laceration repair    Consultants:  PT  ENT    Medications  acetaminophen     Tablet .. 650 milliGRAM(s) Oral every 6 hours PRN  diphtheria/tetanus/pertussis (acellular) Vaccine (ADAcel) 0.5 milliLiter(s) IntraMuscular once  enoxaparin Injectable 40 milliGRAM(s) SubCutaneous every 24 hours      [x ] I attest I have reviewed and reconciled all medications prior to transfer    IV Fluids  sodium chloride 0.9% Bolus:   500 milliLiter(s), IV Bolus, once, infuse over 60 Minute(s), Stop After 1 Doses  sodium chloride 0.9%.: Solution, 1000 milliLiter(s) infuse at 100 mL/Hr  sodium chloride 0.9% Bolus:   250 milliLiter(s), IV Bolus, once, infuse over 60 Minute(s), Stop After 1 Doses  Special Instructions: Peripheral Line 1      Antibiotics: Ancef x 1 for scalp laceration    I have discussed this case with Hannah Gutierrez PA-C upon transfer and all questions regarding ICU course were answered.  The following items are to be followed up:    - Serial H/H  - CT head x 2 stable:  No SAH visualized on repeat Head CT  - Serial exams and monitor skin  - Local wound care to scalp lac:  Pressure dressing placed: Remove 3/26  - Pt with nasal polyposis with possible fungal sinusitis:  F/up with ENT  - PT/OT  - Dispo planning

## 2022-03-26 LAB
ANION GAP SERPL CALC-SCNC: 11 MMOL/L — SIGNIFICANT CHANGE UP (ref 5–17)
BASOPHILS # BLD AUTO: 0.04 K/UL — SIGNIFICANT CHANGE UP (ref 0–0.2)
BASOPHILS NFR BLD AUTO: 0.7 % — SIGNIFICANT CHANGE UP (ref 0–2)
BUN SERPL-MCNC: 13.6 MG/DL — SIGNIFICANT CHANGE UP (ref 8–20)
CALCIUM SERPL-MCNC: 8.6 MG/DL — SIGNIFICANT CHANGE UP (ref 8.6–10.2)
CHLORIDE SERPL-SCNC: 104 MMOL/L — SIGNIFICANT CHANGE UP (ref 98–107)
CO2 SERPL-SCNC: 25 MMOL/L — SIGNIFICANT CHANGE UP (ref 22–29)
CREAT SERPL-MCNC: 0.52 MG/DL — SIGNIFICANT CHANGE UP (ref 0.5–1.3)
EGFR: 101 ML/MIN/1.73M2 — SIGNIFICANT CHANGE UP
EOSINOPHIL # BLD AUTO: 0.09 K/UL — SIGNIFICANT CHANGE UP (ref 0–0.5)
EOSINOPHIL NFR BLD AUTO: 1.6 % — SIGNIFICANT CHANGE UP (ref 0–6)
GLUCOSE SERPL-MCNC: 123 MG/DL — HIGH (ref 70–99)
HCT VFR BLD CALC: 28.1 % — LOW (ref 34.5–45)
HGB BLD-MCNC: 9 G/DL — LOW (ref 11.5–15.5)
IMM GRANULOCYTES NFR BLD AUTO: 0.3 % — SIGNIFICANT CHANGE UP (ref 0–1.5)
LYMPHOCYTES # BLD AUTO: 1.5 K/UL — SIGNIFICANT CHANGE UP (ref 1–3.3)
LYMPHOCYTES # BLD AUTO: 26.2 % — SIGNIFICANT CHANGE UP (ref 13–44)
MAGNESIUM SERPL-MCNC: 2.1 MG/DL — SIGNIFICANT CHANGE UP (ref 1.6–2.6)
MCHC RBC-ENTMCNC: 30.7 PG — SIGNIFICANT CHANGE UP (ref 27–34)
MCHC RBC-ENTMCNC: 32 GM/DL — SIGNIFICANT CHANGE UP (ref 32–36)
MCV RBC AUTO: 95.9 FL — SIGNIFICANT CHANGE UP (ref 80–100)
MONOCYTES # BLD AUTO: 0.67 K/UL — SIGNIFICANT CHANGE UP (ref 0–0.9)
MONOCYTES NFR BLD AUTO: 11.7 % — SIGNIFICANT CHANGE UP (ref 2–14)
NEUTROPHILS # BLD AUTO: 3.41 K/UL — SIGNIFICANT CHANGE UP (ref 1.8–7.4)
NEUTROPHILS NFR BLD AUTO: 59.5 % — SIGNIFICANT CHANGE UP (ref 43–77)
PHOSPHATE SERPL-MCNC: 2.9 MG/DL — SIGNIFICANT CHANGE UP (ref 2.4–4.7)
PLATELET # BLD AUTO: 210 K/UL — SIGNIFICANT CHANGE UP (ref 150–400)
POTASSIUM SERPL-MCNC: 4.2 MMOL/L — SIGNIFICANT CHANGE UP (ref 3.5–5.3)
POTASSIUM SERPL-SCNC: 4.2 MMOL/L — SIGNIFICANT CHANGE UP (ref 3.5–5.3)
RBC # BLD: 2.93 M/UL — LOW (ref 3.8–5.2)
RBC # FLD: 13.8 % — SIGNIFICANT CHANGE UP (ref 10.3–14.5)
SODIUM SERPL-SCNC: 140 MMOL/L — SIGNIFICANT CHANGE UP (ref 135–145)
WBC # BLD: 5.73 K/UL — SIGNIFICANT CHANGE UP (ref 3.8–10.5)
WBC # FLD AUTO: 5.73 K/UL — SIGNIFICANT CHANGE UP (ref 3.8–10.5)

## 2022-03-26 PROCEDURE — 99231 SBSQ HOSP IP/OBS SF/LOW 25: CPT | Mod: GC

## 2022-03-26 RX ORDER — TRAMADOL HYDROCHLORIDE 50 MG/1
25 TABLET ORAL EVERY 6 HOURS
Refills: 0 | Status: DISCONTINUED | OUTPATIENT
Start: 2022-03-26 | End: 2022-03-27

## 2022-03-26 RX ORDER — ACETAMINOPHEN 500 MG
975 TABLET ORAL EVERY 6 HOURS
Refills: 0 | Status: DISCONTINUED | OUTPATIENT
Start: 2022-03-26 | End: 2022-03-27

## 2022-03-26 RX ORDER — IBUPROFEN 200 MG
400 TABLET ORAL EVERY 6 HOURS
Refills: 0 | Status: DISCONTINUED | OUTPATIENT
Start: 2022-03-26 | End: 2022-03-27

## 2022-03-26 RX ADMIN — TRAMADOL HYDROCHLORIDE 25 MILLIGRAM(S): 50 TABLET ORAL at 17:29

## 2022-03-26 RX ADMIN — TRAMADOL HYDROCHLORIDE 25 MILLIGRAM(S): 50 TABLET ORAL at 11:28

## 2022-03-26 RX ADMIN — ENOXAPARIN SODIUM 40 MILLIGRAM(S): 100 INJECTION SUBCUTANEOUS at 22:01

## 2022-03-26 RX ADMIN — Medication 650 MILLIGRAM(S): at 05:24

## 2022-03-26 RX ADMIN — TRAMADOL HYDROCHLORIDE 25 MILLIGRAM(S): 50 TABLET ORAL at 18:00

## 2022-03-26 RX ADMIN — Medication 650 MILLIGRAM(S): at 06:24

## 2022-03-26 RX ADMIN — TRAMADOL HYDROCHLORIDE 25 MILLIGRAM(S): 50 TABLET ORAL at 12:15

## 2022-03-26 NOTE — PROGRESS NOTE ADULT - ATTENDING COMMENTS
Agree with above assessment.  The patient was seen and examined by me.  The patient was overall feeling improved.  The posterior scalp laceration is without evidence of active bleeding or swelling.  The left flank ecchymosis is stable.  Monitor H/H, OOB as tolerated.  PO as tolerated.  Trauma stable, hopeful for discharge planning soon.

## 2022-03-26 NOTE — PHYSICAL THERAPY INITIAL EVALUATION ADULT - ADDITIONAL COMMENTS
Patient will be returning home to son's house with 3 NELLY and a ful flight to bed/bath. Denies owning any DME Negative

## 2022-03-26 NOTE — OCCUPATIONAL THERAPY INITIAL EVALUATION ADULT - LIVES WITH, PROFILE
with her son in [place], as she waits to move into a senior housing complex.   Upon discharge from hospital she plans to stay at son's home on [address] as long as needed.

## 2022-03-26 NOTE — PHYSICAL THERAPY INITIAL EVALUATION ADULT - DISCHARGE DISPOSITION, PT EVAL
no skilled acute PT needs. Patient may benefit from outpatient PT for inc LBP and to improve confidence with performing stairs/no skilled PT needs

## 2022-03-26 NOTE — PROGRESS NOTE ADULT - SUBJECTIVE AND OBJECTIVE BOX
Subjective: Patient seen and examined at bedside. CAS. Patient states her head and the bruise on her back started to bother her today, but other than that no new or acute complaints at this time.     She denies fever, chills, chest pain, palpitations, SOB, dyspnea, abdominal pain, N/V, headache, double vision.       STATUS POST:      POST OPERATIVE DAY #:     MEDICATIONS  (STANDING):  enoxaparin Injectable 40 milliGRAM(s) SubCutaneous every 24 hours    MEDICATIONS  (PRN):  acetaminophen     Tablet .. 650 milliGRAM(s) Oral every 6 hours PRN Mild Pain (1 - 3), Moderate Pain (4 - 6)      Vital Signs Last 24 Hrs  T(C): 36.9 (25 Mar 2022 19:25), Max: 36.9 (25 Mar 2022 04:00)  T(F): 98.5 (25 Mar 2022 19:25), Max: 98.5 (25 Mar 2022 04:00)  HR: 87 (25 Mar 2022 21:00) (67 - 92)  BP: 131/79 (25 Mar 2022 21:00) (102/57 - 152/83)  BP(mean): 93 (25 Mar 2022 21:00) (72 - 101)  RR: 16 (25 Mar 2022 21:00) (16 - 25)  SpO2: 99% (25 Mar 2022 21:00) (94% - 100%)    Physical Exam:    Constitutional: NAD  HEENT: PERRL, EOMI  Neck: No JVD, FROM without pain  Respiratory: Respirations non-labored, no accessory muscle use  Gastrointestinal: Soft, non-tender, non-distended  Extremities: No peripheral edema, No cyanosis  Neurological: A&O x 3; without gross deficit  Musculoskeletal: No joint pain, swelling, deformity, or point tenderness; no limitation of movement      LABS:                        9.4    x     )-----------( x        ( 25 Mar 2022 19:49 )             29.0     03-    142  |  105  |  17.4  ----------------------------<  169<H>  3.7   |  24.0  |  0.58    Ca    8.1<L>      25 Mar 2022 04:07  Phos  2.6     03-25  Mg     1.6     03-25    TPro  7.0  /  Alb  3.9  /  TBili  <0.2<L>  /  DBili  x   /  AST  41<H>  /  ALT  48<H>  /  AlkPhos  81      PT/INR - ( 24 Mar 2022 23:21 )   PT: 12.4 sec;   INR: 1.07 ratio         PTT - ( 24 Mar 2022 23:21 )  PTT:30.5 sec  Urinalysis Basic - ( 25 Mar 2022 14:28 )    Color: Yellow / Appearance: Clear / S.015 / pH: x  Gluc: x / Ketone: Negative  / Bili: Negative / Urobili: Negative mg/dL   Blood: x / Protein: Negative / Nitrite: Negative   Leuk Esterase: Negative / RBC: x / WBC x   Sq Epi: x / Non Sq Epi: x / Bacteria: x        A:     Plan:   -   Subjective: Patient seen and examined at bedside. CAS. Patient states her head and the bruise on her back started to bother her today, but other than that no new or acute complaints at this time. She is tolerating a regular diet, voiding, and pain is under control on current regimen. She denies fever, chills, chest pain, palpitations, SOB, dyspnea, abdominal pain, N/V, headache, double vision.     MEDICATIONS  (STANDING):  enoxaparin Injectable 40 milliGRAM(s) SubCutaneous every 24 hours    MEDICATIONS  (PRN):  acetaminophen     Tablet .. 650 milliGRAM(s) Oral every 6 hours PRN Mild Pain (1 - 3), Moderate Pain (4 - 6)    Vital Signs Last 24 Hrs  T(C): 36.9 (25 Mar 2022 19:25), Max: 36.9 (25 Mar 2022 04:00)  T(F): 98.5 (25 Mar 2022 19:25), Max: 98.5 (25 Mar 2022 04:00)  HR: 87 (25 Mar 2022 21:00) (67 - 92)  BP: 131/79 (25 Mar 2022 21:00) (102/57 - 152/83)  BP(mean): 93 (25 Mar 2022 21:00) (72 - 101)  RR: 16 (25 Mar 2022 21:00) (16 - 25)  SpO2: 99% (25 Mar 2022 21:00) (94% - 100%)    Physical Exam:    Constitutional: NAD  HEENT: PERRL, EOMI, laceration covered with pressure dressing which is c/d/i   Neck: No JVD, FROM without pain  Respiratory: Respirations non-labored, no accessory muscle use  Gastrointestinal: Soft, non-tender, non-distended  Extremities: No peripheral edema, No cyanosis, 2+ pulses b/l   Neurological: A&O x 3; without gross deficit  Musculoskeletal: Left flank eccymosis that goes to lateral hip and past midline to the right right flank. No joint pain, swelling, deformity, or point tenderness; no limitation of movement    LABS:                        9.4    x     )-----------( x        ( 25 Mar 2022 19:49 )             29.0     03-25    142  |  105  |  17.4  ----------------------------<  169<H>  3.7   |  24.0  |  0.58    Ca    8.1<L>      25 Mar 2022 04:07  Phos  2.6     03-  Mg     1.6     -    TPro  7.0  /  Alb  3.9  /  TBili  <0.2<L>  /  DBili  x   /  AST  41<H>  /  ALT  48<H>  /  AlkPhos  81  03-24    PT/INR - ( 24 Mar 2022 23:21 )   PT: 12.4 sec;   INR: 1.07 ratio      PTT - ( 24 Mar 2022 23:21 )  PTT:30.5 sec  Urinalysis Basic - ( 25 Mar 2022 14:28 )    Color: Yellow / Appearance: Clear / S.015 / pH: x  Gluc: x / Ketone: Negative  / Bili: Negative / Urobili: Negative mg/dL   Blood: x / Protein: Negative / Nitrite: Negative   Leuk Esterase: Negative / RBC: x / WBC x   Sq Epi: x / Non Sq Epi: x / Bacteria: x    Assessment: Patient is a 65 yo F s/p fall down the steps.     Plan:   -Trend H/H   - Serial exams, monitor skin   - Local wound care to scalp laceration, pressure dressing is to be removed 3/26   - Follow up ENT consult   - PT/OT   - Discharge planning

## 2022-03-26 NOTE — PHYSICAL THERAPY INITIAL EVALUATION ADULT - LIVES WITH, PROFILE
Attending Only with son in [place], plans to move into a senior housing complex in [place] independently/children

## 2022-03-26 NOTE — OCCUPATIONAL THERAPY INITIAL EVALUATION ADULT - ADDITIONAL COMMENTS
Pt will be returning to her son's home in [place].  He has about 3 steps to enter, then a full flight to bedroom.  She drives.  Pt has no medical equipment.
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
## 2022-03-27 VITALS
TEMPERATURE: 98 F | SYSTOLIC BLOOD PRESSURE: 138 MMHG | RESPIRATION RATE: 18 BRPM | OXYGEN SATURATION: 98 % | HEART RATE: 70 BPM | DIASTOLIC BLOOD PRESSURE: 74 MMHG

## 2022-03-27 LAB
BASOPHILS # BLD AUTO: 0.07 K/UL — SIGNIFICANT CHANGE UP (ref 0–0.2)
BASOPHILS NFR BLD AUTO: 1.1 % — SIGNIFICANT CHANGE UP (ref 0–2)
EOSINOPHIL # BLD AUTO: 0.15 K/UL — SIGNIFICANT CHANGE UP (ref 0–0.5)
EOSINOPHIL NFR BLD AUTO: 2.4 % — SIGNIFICANT CHANGE UP (ref 0–6)
HCT VFR BLD CALC: 29.2 % — LOW (ref 34.5–45)
HGB BLD-MCNC: 9.3 G/DL — LOW (ref 11.5–15.5)
IMM GRANULOCYTES NFR BLD AUTO: 0.8 % — SIGNIFICANT CHANGE UP (ref 0–1.5)
LYMPHOCYTES # BLD AUTO: 1.48 K/UL — SIGNIFICANT CHANGE UP (ref 1–3.3)
LYMPHOCYTES # BLD AUTO: 23.8 % — SIGNIFICANT CHANGE UP (ref 13–44)
MCHC RBC-ENTMCNC: 30.4 PG — SIGNIFICANT CHANGE UP (ref 27–34)
MCHC RBC-ENTMCNC: 31.8 GM/DL — LOW (ref 32–36)
MCV RBC AUTO: 95.4 FL — SIGNIFICANT CHANGE UP (ref 80–100)
MONOCYTES # BLD AUTO: 0.63 K/UL — SIGNIFICANT CHANGE UP (ref 0–0.9)
MONOCYTES NFR BLD AUTO: 10.1 % — SIGNIFICANT CHANGE UP (ref 2–14)
NEUTROPHILS # BLD AUTO: 3.83 K/UL — SIGNIFICANT CHANGE UP (ref 1.8–7.4)
NEUTROPHILS NFR BLD AUTO: 61.8 % — SIGNIFICANT CHANGE UP (ref 43–77)
PLATELET # BLD AUTO: 223 K/UL — SIGNIFICANT CHANGE UP (ref 150–400)
RBC # BLD: 3.06 M/UL — LOW (ref 3.8–5.2)
RBC # FLD: 13.4 % — SIGNIFICANT CHANGE UP (ref 10.3–14.5)
WBC # BLD: 6.21 K/UL — SIGNIFICANT CHANGE UP (ref 3.8–10.5)
WBC # FLD AUTO: 6.21 K/UL — SIGNIFICANT CHANGE UP (ref 3.8–10.5)

## 2022-03-27 PROCEDURE — 70486 CT MAXILLOFACIAL W/O DYE: CPT

## 2022-03-27 PROCEDURE — 86900 BLOOD TYPING SEROLOGIC ABO: CPT

## 2022-03-27 PROCEDURE — 85014 HEMATOCRIT: CPT

## 2022-03-27 PROCEDURE — 71045 X-RAY EXAM CHEST 1 VIEW: CPT

## 2022-03-27 PROCEDURE — 36415 COLL VENOUS BLD VENIPUNCTURE: CPT

## 2022-03-27 PROCEDURE — 72125 CT NECK SPINE W/O DYE: CPT | Mod: MA

## 2022-03-27 PROCEDURE — 83605 ASSAY OF LACTIC ACID: CPT

## 2022-03-27 PROCEDURE — 82962 GLUCOSE BLOOD TEST: CPT

## 2022-03-27 PROCEDURE — 74177 CT ABD & PELVIS W/CONTRAST: CPT | Mod: MA

## 2022-03-27 PROCEDURE — 87641 MR-STAPH DNA AMP PROBE: CPT

## 2022-03-27 PROCEDURE — 97167 OT EVAL HIGH COMPLEX 60 MIN: CPT

## 2022-03-27 PROCEDURE — 86850 RBC ANTIBODY SCREEN: CPT

## 2022-03-27 PROCEDURE — 71260 CT THORAX DX C+: CPT | Mod: MA

## 2022-03-27 PROCEDURE — 97163 PT EVAL HIGH COMPLEX 45 MIN: CPT

## 2022-03-27 PROCEDURE — 83735 ASSAY OF MAGNESIUM: CPT

## 2022-03-27 PROCEDURE — 86901 BLOOD TYPING SEROLOGIC RH(D): CPT

## 2022-03-27 PROCEDURE — 81003 URINALYSIS AUTO W/O SCOPE: CPT

## 2022-03-27 PROCEDURE — 87640 STAPH A DNA AMP PROBE: CPT

## 2022-03-27 PROCEDURE — U0003: CPT

## 2022-03-27 PROCEDURE — 72170 X-RAY EXAM OF PELVIS: CPT

## 2022-03-27 PROCEDURE — 85610 PROTHROMBIN TIME: CPT

## 2022-03-27 PROCEDURE — 85730 THROMBOPLASTIN TIME PARTIAL: CPT

## 2022-03-27 PROCEDURE — U0005: CPT

## 2022-03-27 PROCEDURE — 83690 ASSAY OF LIPASE: CPT

## 2022-03-27 PROCEDURE — 80053 COMPREHEN METABOLIC PANEL: CPT

## 2022-03-27 PROCEDURE — 99285 EMERGENCY DEPT VISIT HI MDM: CPT | Mod: 25

## 2022-03-27 PROCEDURE — 85027 COMPLETE CBC AUTOMATED: CPT

## 2022-03-27 PROCEDURE — 93005 ELECTROCARDIOGRAM TRACING: CPT

## 2022-03-27 PROCEDURE — 85025 COMPLETE CBC W/AUTO DIFF WBC: CPT

## 2022-03-27 PROCEDURE — 99231 SBSQ HOSP IP/OBS SF/LOW 25: CPT | Mod: GC

## 2022-03-27 PROCEDURE — 85018 HEMOGLOBIN: CPT

## 2022-03-27 PROCEDURE — 80048 BASIC METABOLIC PNL TOTAL CA: CPT

## 2022-03-27 PROCEDURE — 80307 DRUG TEST PRSMV CHEM ANLYZR: CPT

## 2022-03-27 PROCEDURE — 84100 ASSAY OF PHOSPHORUS: CPT

## 2022-03-27 PROCEDURE — 90715 TDAP VACCINE 7 YRS/> IM: CPT

## 2022-03-27 PROCEDURE — 70450 CT HEAD/BRAIN W/O DYE: CPT | Mod: MA

## 2022-03-27 RX ORDER — TRAMADOL HYDROCHLORIDE 50 MG/1
0.5 TABLET ORAL
Qty: 6 | Refills: 0
Start: 2022-03-27 | End: 2022-03-29

## 2022-03-27 RX ORDER — ACETAMINOPHEN 500 MG
3 TABLET ORAL
Qty: 0 | Refills: 0 | DISCHARGE
Start: 2022-03-27

## 2022-03-27 RX ORDER — IBUPROFEN 200 MG
2 TABLET ORAL
Qty: 0 | Refills: 0 | DISCHARGE

## 2022-03-27 RX ADMIN — TRAMADOL HYDROCHLORIDE 25 MILLIGRAM(S): 50 TABLET ORAL at 01:22

## 2022-03-27 RX ADMIN — TRAMADOL HYDROCHLORIDE 25 MILLIGRAM(S): 50 TABLET ORAL at 06:33

## 2022-03-27 RX ADMIN — TRAMADOL HYDROCHLORIDE 25 MILLIGRAM(S): 50 TABLET ORAL at 00:22

## 2022-03-27 NOTE — DISCHARGE NOTE PROVIDER - PROVIDER RX CONTACT NUMBER
MEDICATION: Desonide Cream    LAST SEEN:03/02/201    NEXT APPOINTMENT: NA    LAST REFILLED: 9/4/2019      
[phone]

## 2022-03-27 NOTE — DISCHARGE NOTE PROVIDER - HOSPITAL COURSE
Patient is a 65 y/o female s/p fall down stairs sustaining small frontoparietal SAH/IPH, a subcutaneous left flank hematoma, and a posterior scalp laceration. She was admitted to the SICU under the trauma service. Neurosx consulted for SAH/IPH - repeat CT head stable. Left flank hematoma monitored with serial exams and H&H trended - skin remained soft and abdominal exam benign. Posterior scalp laceration repaired at bedside. Pt noted to have some oozing from laceration - pressure dressing applied and when re-evaluated hemostasis was achieved. Hgb initially droppped from 12>10>9 and then stabilized and pt remained hemodynamically well. Patient remained without neuro deficits and she was downgraded to the floors in stable condition. Evaluated by physical therapy who recommended home with no skilled PT needs upon discharge. Of note, patient was found to have severe sinus disease on CT head/face imaging. ENT contact information will be provided upon discharge for further mgmt and pt encouraged to call and make appt for follow-up. Patient is neuro intact, pain minimal & well controlled, tolerating diet withoiut pain, nausea, or vomiting, OOB ambulating, voiding and having bowel function. Stable for discharge with outpatient follow-up.    Patient is advised to RETURN TO THE EMERGENCY DEPARTMENT for any of the following - worsening pain, fever/chills, nausea/vomiting, altered mental status, chest pain, shortness of breath, or any other new / worsening symptom.

## 2022-03-27 NOTE — DISCHARGE NOTE NURSING/CASE MANAGEMENT/SOCIAL WORK - NSDCVIVACCINE_GEN_ALL_CORE_FT
Tdap; 24-Mar-2022 23:00; Maximo Sheppard (RN); [employer]; [id number] (Exp. Date: 28-Sep-2023); IntraMuscular; Deltoid Left.; 0.5 milliLiter(s); VIS (VIS Published: 09-May-2013, VIS Presented: 24-Mar-2022);   Tdap; 25-Mar-2022 17:01; Eleanor Perry (RN); [employer]; [id number] (Exp. Date: 09-Sep-2023); IntraMuscular; Deltoid Left.; 0.5 milliLiter(s); VIS (VIS Published: 09-May-2013, VIS Presented: 25-Mar-2022);

## 2022-03-27 NOTE — DISCHARGE NOTE PROVIDER - NSDCFUADDAPPT_GEN_ALL_CORE_FT
** If you have already returned [place] prior to your follow-up appointments, please follow-up with your primary care provider for wound re-evaluation / suture removal by 4/5/22. Please also ask your PCP for referrals for local neurosurgeons / ENT physicians in your area so that you can have proper follow-up for your injuries and medical conditions **

## 2022-03-27 NOTE — DISCHARGE NOTE PROVIDER - NSFOLLOWUPCLINICS_GEN_ALL_ED_FT
[facility] Acute Care Surgery  Acute Care Surgery  [address] [place] [id number]  Phone: [phone]  Fax:

## 2022-03-27 NOTE — DISCHARGE NOTE PROVIDER - CARE PROVIDER_API CALL
Cornelius Garcia; PhD)  Neurosurgery  [address] [place] [address]  Phone: [phone]  Fax: [phone]  Follow Up Time: 2 weeks    Murphy Bailey)  Otolaryngology  [facility] [place] [address]  Phone: [phone]  Fax: [phone]  Follow Up Time:

## 2022-03-27 NOTE — PROGRESS NOTE ADULT - SUBJECTIVE AND OBJECTIVE BOX
HPI/OVERNIGHT EVENTS: Patient seen and examined at bedside this AM. No overnight events. No complaints. Denies fever, chills, nausea, vomiting, chest pain, SOB, dizziness, abd pain or any other concerning symptoms.    Vital Signs Last 24 Hrs  T(C): 37.4 (27 Mar 2022 04:21), Max: 37.4 (27 Mar 2022 04:21)  T(F): 99.3 (27 Mar 2022 04:21), Max: 99.3 (27 Mar 2022 04:21)  HR: 65 (27 Mar 2022 04:21) (65 - 89)  BP: 136/78 (27 Mar 2022 04:21) (120/80 - 150/79)  BP(mean): --  RR: 18 (27 Mar 2022 04:21) (15 - 18)  SpO2: 95% (27 Mar 2022 04:21) (94% - 99%)    I&O's Detail    25 Mar 2022 07:01  -  26 Mar 2022 07:00  --------------------------------------------------------  IN:    Oral Fluid: 1380 mL    sodium chloride 0.9%: 200 mL  Total IN: 1580 mL    OUT:    Voided (mL): 2000 mL  Total OUT: 2000 mL    Total NET: -420 mL      Constitutional: NAD  HEENT: PERRL, EOMI, laceration covered with pressure dressing which is c/d/i   Neck: No JVD, FROM without pain  Respiratory: Respirations non-labored, no accessory muscle use  Gastrointestinal: Soft, non-tender, non-distended  Extremities: No peripheral edema, No cyanosis, 2+ pulses b/l   Neurological: A&O x 3; without gross deficit  Musculoskeletal: Left flank eccymosis that goes to lateral hip and past midline to the right right flank. No joint pain, swelling, deformity, or point tenderness; no limitation of movement    LABS:                        9.0    5.73  )-----------( 210      ( 26 Mar 2022 07:53 )             28.1     03-26    140  |  104  |  13.6  ----------------------------<  123<H>  4.2   |  25.0  |  0.52    Ca    8.6      26 Mar 2022 07:51  Phos  2.9       Mg     2.1             Urinalysis Basic - ( 25 Mar 2022 14:28 )    Color: Yellow / Appearance: Clear / S.015 / pH: x  Gluc: x / Ketone: Negative  / Bili: Negative / Urobili: Negative mg/dL   Blood: x / Protein: Negative / Nitrite: Negative   Leuk Esterase: Negative / RBC: x / WBC x   Sq Epi: x / Non Sq Epi: x / Bacteria: x        MEDICATIONS  (STANDING):  enoxaparin Injectable 40 milliGRAM(s) SubCutaneous every 24 hours    MEDICATIONS  (PRN):  acetaminophen     Tablet .. 975 milliGRAM(s) Oral every 6 hours PRN Mild Pain (1 - 3)  ibuprofen  Tablet. 400 milliGRAM(s) Oral every 6 hours PRN Moderate Pain (4 - 6)  traMADol 25 milliGRAM(s) Oral every 6 hours PRN Severe Pain (7 - 10)

## 2022-03-27 NOTE — DISCHARGE NOTE PROVIDER - NSDCMRMEDTOKEN_GEN_ALL_CORE_FT
acetaminophen 325 mg oral tablet: 3 tab(s) orally every 6 hours, As needed, Mild Pain (1 - 3)  Advil 200 mg oral tablet: 2 tab(s) orally every 6 hours, As Needed  Claritin 24 Hour Allergy 10 mg oral tablet: 1 tab(s) orally once a day  Flonase 50 mcg/inh nasal spray: 1 spray(s) nasal once a day  traMADol 50 mg oral tablet: 0.5 tab(s) orally every 6 hours, As needed, Severe Pain (7 - 10) MDD:2

## 2022-03-27 NOTE — DISCHARGE NOTE NURSING/CASE MANAGEMENT/SOCIAL WORK - PATIENT PORTAL LINK FT
You can access the FollowMyHealth Patient Portal offered by [facility] by registering at the following website: [email or web address]. By joining Dr. TATTOFF’s FollowMyHealth portal, you will also be able to view your health information using other applications (apps) compatible with our system.

## 2022-03-27 NOTE — CHART NOTE - NSCHARTNOTEFT_GEN_A_CORE
Patient asked that I call her son to notify him of discharge and answer any questions he may have regarding discharge plan / follow-up. I spoke with son Frankie @ [phone] - states he does not have any questions at this time, appreciative of update, and will notify RN when he arrives to hospital to  his mom if he has any questions or concerns.

## 2022-03-27 NOTE — DISCHARGE NOTE PROVIDER - NSDCCPCAREPLAN_GEN_ALL_CORE_FT
PRINCIPAL DISCHARGE DIAGNOSIS  Diagnosis: Traumatic subarachnoid hemorrhage  Assessment and Plan of Treatment: Follow up: Please call and make an appointment with neurosurgeon Dr. Garcia 2 weeks after discharge. Also, please call and make an appointment with your primary care physician as per your usual schedule.   Activity: May return to normal activities as tolerated.  Diet: May continue regular diet.  Medications: Please resume all home medications as previously described. Pain medication has been prescribed for you (tramadol). Please, take it as it has been prescribed, do not drive or operate heavy machinery while taking narcotics.  You are encouraged to take over-the-counter tylenol and/or ibuprofen for pain relief when you feel your pain no longer warrants the use of narcotic pain medications.  Patient is advised to RETURN TO THE EMERGENCY DEPARTMENT for any of the following - worsening pain, fever/chills, nausea/vomiting, altered mental status, chest pain, shortness of breath, or any other new / worsening symptom.      SECONDARY DISCHARGE DIAGNOSES  Diagnosis: Hematoma of left flank  Assessment and Plan of Treatment:     Diagnosis: Scalp laceration  Assessment and Plan of Treatment: Follow-up: Please call and make a follow-up appointment to be seen at the Acute Care Surgery / Trauma clinicn on Tuesday 4/5/22 for wound re-evaluation and suture removal.  Wound Care: Please, keep wound site clean and dry. You may shower, but do not bathe.    Diagnosis: Sinusitis  Assessment and Plan of Treatment: We recommend you follow-up with an ENT (ear nose throat) specialist after discharge for further management. If you do not already have an establised ENT physician, contact information for a [facility] ENT physician (Dr. Bailey) is provided on your discharge paperwork.     PRINCIPAL DISCHARGE DIAGNOSIS  Diagnosis: Traumatic subarachnoid hemorrhage  Assessment and Plan of Treatment: Follow up: Please call and make an appointment with neurosurgeon Dr. Garcia 1-2 weeks after discharge. Also, please call and make an appointment with your primary care physician as per your usual schedule.   Activity: May return to normal activities as tolerated.  Diet: May continue regular diet.  Medications: Please resume all home medications as previously described. Pain medication has been prescribed for you (tramadol). Please, take it as it has been prescribed, do not drive or operate heavy machinery while taking narcotics.  You are encouraged to take over-the-counter tylenol and/or ibuprofen for pain relief when you feel your pain no longer warrants the use of narcotic pain medications.  Patient is advised to RETURN TO THE EMERGENCY DEPARTMENT for any of the following - worsening pain, fever/chills, nausea/vomiting, altered mental status, chest pain, shortness of breath, or any other new / worsening symptom.      SECONDARY DISCHARGE DIAGNOSES  Diagnosis: Hematoma of left flank  Assessment and Plan of Treatment:     Diagnosis: Scalp laceration  Assessment and Plan of Treatment: Follow-up: Please call and make a follow-up appointment to be seen at the Acute Care Surgery / Trauma clinic on Tuesday 4/5/22 for wound re-evaluation and suture removal.  Wound Care: Please, keep wound site clean and dry. You may shower, but do not bathe.    Diagnosis: Sinusitis  Assessment and Plan of Treatment: We recommend you follow-up with an ENT (ear nose throat) specialist after discharge for further management. If you do not already have an establised ENT physician, contact information for a [facility] ENT physician (Dr. Bailey) is provided on your discharge paperwork.

## 2022-03-27 NOTE — DISCHARGE NOTE PROVIDER - NSDCCPTREATMENT_GEN_ALL_CORE_FT
PRINCIPAL PROCEDURE  Procedure: Complex repair of laceration of scalp, 2.6 cm to 7.5 cm  Findings and Treatment:

## 2022-03-27 NOTE — DISCHARGE NOTE NURSING/CASE MANAGEMENT/SOCIAL WORK - NSDCPEFALRISK_GEN_ALL_CORE
For information on Fall & Injury Prevention, visit: [email or web address] OR  [email or web address] OR  https://www.cdc.gov/steadi/patient.html

## 2022-03-27 NOTE — PROGRESS NOTE ADULT - ATTENDING COMMENTS
Agree with above assessment.  The patient was seen and examined by me.  The patient is without acute events overnight.  The scalp laceration is clean without bleeding or signs of infection.  The left flank hematoma is without clinical change.  The patient is trauma stable for discharge home.

## 2022-03-27 NOTE — PROGRESS NOTE ADULT - ASSESSMENT
67 yo F s/p fall down the steps who sustained a small frontoparietal SAH/IPH, a posterior scalp laceration, and a left subcutaneous flank hematoma. H/H has been stable.      Plan:   -Trend H/H   - Serial exams, monitor skin   - Local wound care to scalp laceration  - Follow up ENT consult   - PT/OT   - Discharge planning

## 2022-04-05 ENCOUNTER — OFFICE VISIT (OUTPATIENT)
Dept: FAMILY MEDICINE CLINIC | Facility: CLINIC | Age: 67
End: 2022-04-05
Payer: COMMERCIAL

## 2022-04-05 VITALS
DIASTOLIC BLOOD PRESSURE: 82 MMHG | SYSTOLIC BLOOD PRESSURE: 140 MMHG | HEART RATE: 82 BPM | RESPIRATION RATE: 16 BRPM | HEIGHT: 64 IN | WEIGHT: 160 LBS | BODY MASS INDEX: 27.31 KG/M2 | TEMPERATURE: 98.4 F | OXYGEN SATURATION: 98 %

## 2022-04-05 DIAGNOSIS — E66.3 OVERWEIGHT (BMI 25.0-29.9): ICD-10-CM

## 2022-04-05 DIAGNOSIS — S01.01XA LACERATION OF SCALP, INITIAL ENCOUNTER: ICD-10-CM

## 2022-04-05 DIAGNOSIS — Z12.11 SCREENING FOR COLON CANCER: ICD-10-CM

## 2022-04-05 DIAGNOSIS — Z78.0 POST-MENOPAUSAL: ICD-10-CM

## 2022-04-05 DIAGNOSIS — C43.61 MELANOMA OF SHOULDER, RIGHT (HCC): ICD-10-CM

## 2022-04-05 DIAGNOSIS — S06.6X0A SUBARACHNOID HEMORRHAGE FOLLOWING INJURY, NO LOSS OF CONSCIOUSNESS, INITIAL ENCOUNTER (HCC): Primary | ICD-10-CM

## 2022-04-05 DIAGNOSIS — J32.9 RECURRENT SINUSITIS: ICD-10-CM

## 2022-04-05 DIAGNOSIS — Z13.220 SCREENING FOR CHOLESTEROL LEVEL: ICD-10-CM

## 2022-04-05 DIAGNOSIS — Z13.820 SCREENING FOR OSTEOPOROSIS: ICD-10-CM

## 2022-04-05 DIAGNOSIS — Z13.29 SCREENING FOR THYROID DISORDER: ICD-10-CM

## 2022-04-05 DIAGNOSIS — Z11.3 SCREENING FOR STD (SEXUALLY TRANSMITTED DISEASE): ICD-10-CM

## 2022-04-05 DIAGNOSIS — Z12.31 ENCOUNTER FOR SCREENING MAMMOGRAM FOR BREAST CANCER: ICD-10-CM

## 2022-04-05 DIAGNOSIS — Z83.3 FAMILY HISTORY OF DIABETES MELLITUS (DM): ICD-10-CM

## 2022-04-05 DIAGNOSIS — S20.222A CONTUSION OF LEFT SIDE OF BACK, INITIAL ENCOUNTER: ICD-10-CM

## 2022-04-05 PROCEDURE — 99204 OFFICE O/P NEW MOD 45 MIN: CPT | Performed by: NURSE PRACTITIONER

## 2022-04-05 RX ORDER — TRAMADOL HYDROCHLORIDE 50 MG/1
TABLET ORAL
COMMUNITY
Start: 2022-03-27 | End: 2022-04-19 | Stop reason: ALTCHOICE

## 2022-04-05 RX ORDER — FLUTICASONE PROPIONATE 50 MCG
2 SPRAY, SUSPENSION (ML) NASAL DAILY
COMMUNITY

## 2022-04-05 RX ORDER — LORATADINE 10 MG/1
10 TABLET ORAL DAILY
COMMUNITY

## 2022-04-05 NOTE — ASSESSMENT & PLAN NOTE
Acute symptomatic patient had fall down basement steps 3/25/2022, hospitalized 3/25-3/27 in [facility]  Laceration on top left side of scalp-10 Sutures- no redness, swelling, odor, drainage noted  Denies fever chills  10 sutures removed tolerated well, no immediate complications and applied antibiotic ointment

## 2022-04-05 NOTE — CHART NOTE - NSCHARTNOTEFT_GEN_A_CORE
Please note upon review of chart the following diagnosis exists for this patient:  1.	Lactate Not clinically significant      Supporting Documentation:       Lactate, Blood:  Lactate, Blood (03.25.22 @ 04:08)   Lactate, Blood: 2.0 mmol/L   Lactate, Blood (03.24.22 @ 23:21)   Lactate, Blood: 2.4 mmol/L       Carbon dioxide, Serum:  Carbon Dioxide, Serum: 25.0 mmol/L (03.26.22 @ 07:51)   Carbon Dioxide, Serum: 24.0 mmol/L (03.25.22 @ 04:07)   Carbon Dioxide, Serum: 21.0 mmol/L (03.24.22 @ 23:11)         Sodium Chloride 250 ml bolus x1 dose, given 3/24/22  Sodium Chloride at 100 ml/hr, given 3/25/22  Sodium Chloride 500 ml bolus x1 dose, given 3/25/22.

## 2022-04-05 NOTE — CDI QUERY NOTE - NSCDIOTHERTXTBX_GEN_ALL_CORE_HH
Can you please clarify if elevated lactate level on admission supports a clinically significant diagnosis?  A.	Lactic acidosis, resolved  B.	Other, please specify  C.	Not clinically significant      Supporting Documentation:       Lactate, Blood:  Lactate, Blood (03.25.22 @ 04:08)   Lactate, Blood: 2.0 mmol/L   Lactate, Blood (03.24.22 @ 23:21)   Lactate, Blood: 2.4 mmol/L       Carbon dioxide, Serum:  Carbon Dioxide, Serum: 25.0 mmol/L (03.26.22 @ 07:51)   Carbon Dioxide, Serum: 24.0 mmol/L (03.25.22 @ 04:07)   Carbon Dioxide, Serum: 21.0 mmol/L (03.24.22 @ 23:11)         Sodium Chloride 250 ml bolus x1 dose, given 3/24/22  Sodium Chloride at 100 ml/hr, given 3/25/22  Sodium Chloride 500 ml bolus x1 dose, given 3/25/22

## 2022-04-05 NOTE — ASSESSMENT & PLAN NOTE
Fall down steps 3/25/2022, hospitalized 3/25/2022-3/27/2022  No records available (awaiting records transferred), but patient did bring hospital d/c instructions and reviewed with patient  Denies LOC, headache, change of vision, N/V/D, fever, chills, mental status change, chest pain, sob  Will recommend patient f/u with neurosurgeon, orders placed

## 2022-04-05 NOTE — ASSESSMENT & PLAN NOTE
Acute symptomatic bilateral lumbar back down to bilateral buttock, worse on left side following fall down basement stairs 3/25/2022  Hospitalized 3/25/2022-3/27/2022  Patient reports all xrays negative  Awaiting records transferred  Patient not currently on blood thinners  Denies difficulty walking, radiation of pain, numbness, tingling, changes to bowel/bladder habits  Currently taking advil prn for pain  Educated on heat and cold therapy  Will recommend cold/heat therapy, and continue advil prn for pain

## 2022-04-05 NOTE — ASSESSMENT & PLAN NOTE
Symptomatic patient with chronic history of sinusitis  Currently with nasal congestion and PND  Patient reports typically takes flonase and claritin daily, stopped during accident  Patient reports finding on CT scan in sinuses and hospital recommended ENT referral  (awaiting records) Will recommend referral to ENT, continue flonase and claritin daily

## 2022-04-05 NOTE — PROGRESS NOTES
Assessment/Plan:    Subarachnoid hemorrhage following injury, no loss of consciousness ([address] )  Fall down steps 3/25/2022, hospitalized 3/25/2022-3/27/2022  No records available (awaiting records transferred), but patient did bring hospital d/c instructions and reviewed with patient  Denies LOC, headache, change of vision, N/V/D, fever, chills, mental status change, chest pain, sob  Will recommend patient f/u with neurosurgeon, orders placed  Scalp laceration  Acute symptomatic patient had fall down basement steps 3/25/2022, hospitalized 3/25-3/27 [facility]  Laceration on top left side of scalp-10 Sutures- no redness, swelling, odor, drainage noted  Denies fever chills  10 sutures removed tolerated well, no immediate complications and applied antibiotic ointment  Contusion of left side of back  Acute symptomatic bilateral lumbar back down to bilateral buttock, worse on left side following fall down basement stairs 3/25/2022  Hospitalized 3/25/2022-3/27/2022  Patient reports all xrays negative  Awaiting records transferred  Patient not currently on blood thinners  Denies difficulty walking, radiation of pain, numbness, tingling, changes to bowel/bladder habits  Currently taking advil prn for pain  Educated on heat and cold therapy  Will recommend cold/heat therapy, and continue advil prn for pain  Recurrent sinusitis  Symptomatic patient with chronic history of sinusitis  Currently with nasal congestion and PND  Patient reports typically takes flonase and claritin daily, stopped during accident  Patient reports finding on CT scan in sinuses and hospital recommended ENT referral  (awaiting records) Will recommend referral to ENT, continue flonase and claritin daily  Family history of diabetes mellitus (DM)  FH DM in father and paternal grandmother  Denies polyphagia, polydipsia, polyuria   Will recommend CMP, fasting insulin, and hgb A1c, orders placed       Diagnoses and all orders for this visit:    Subarachnoid hemorrhage following injury, no loss of consciousness, initial encounter ([facility] [address] )  -     CBC and differential; Future  -     Ambulatory Referral to Neurosurgery; Future    Recurrent sinusitis  -     Ambulatory Referral to Otolaryngology; Future    Overweight (BMI 25 0-29 9)  -     CBC and differential; Future  -     Comprehensive metabolic panel; Future    Screening for osteoporosis  -     DXA bone density spine hip and pelvis; Future    Encounter for screening mammogram for breast cancer  Comments:  No recent mammo since moving x 3 years  Patient willing to do but would like to wait until see neurosurgeon  Orders:  -     Mammo screening bilateral w 3d & cad; Future    Post-menopausal  -     DXA bone density spine hip and pelvis; Future    Screening for colon cancer  Comments:  No PMH colon cancer or polyps  Patient willing to do cologuard, orders placed    Orders:  -     Cologuard    Family history of diabetes mellitus (DM)  -     Insulin, fasting; Future  -     Hemoglobin A1C; Future    Screening for thyroid disorder  -     TSH, 3rd generation with Free T4 reflex; Future    Screening for STD (sexually transmitted disease)  -     Hepatitis C antibody; Future    Screening for cholesterol level  -     Lipid Panel with Direct LDL reflex; Future    Melanoma of shoulder, right [facility])  -     Ambulatory Referral to Dermatology; Future    Laceration of scalp, initial encounter  -     Suture removal    Contusion of left side of back, initial encounter    Other orders  -     fluticasone (FLONASE) 50 mcg/act nasal spray; 2 sprays into each nostril daily  -     loratadine (CLARITIN) 10 mg tablet; Take 10 mg by mouth daily  -     traMADol (ULTRAM) 50 mg tablet; TAKE 1/2 TABLET EVERY 6 HOURS AS NEEDED          Subjective:      Patient ID: Branden Tapia is a 74 y o  female  Patient reports to initiate care as a new patient but also had a fall down stairs while visiting son in [address] [place] [address]  Denies LOC  Patient reports that at night she went to use the bathroom and this was her first time in her sons new home and she opened the door and went to flip the light on, however there was not a light switch and she lost balance and fell down the basement stairs  Patient was in [facility] from 3/25/2022-3/27/2022  Diagnosed with traumatic subarachnoid hemorrhage, hematoma of left flank, scalp laceration, and sinusitis  Records not transferred yet, but patient did bring d/c instructions  Patient also reports long history of recurrent sinusitis- currently with nasal congestion, PND  She was taking flonase/claritin 10mg daily prior to accident, but was told to stop at the hospital  Patient reports that the CT scan showed some abnormality of her sinuses and they recommended f/u with ENT  Patient needs sutures removed bc she didn't stay in [address] [place] [address] for her suture removal appt for today  At this point patient feels much improved and denies fever, headache, blurry vision, double vision, changes to strength/sensation  The following portions of the patient's history were reviewed and updated as appropriate: allergies, current medications, past family history, past medical history, past social history, past surgical history and problem list     Review of Systems   Constitutional: Negative for appetite change, chills, fatigue and fever  HENT: Positive for congestion, ear pain (clogged ears), postnasal drip, sinus pressure and sinus pain  Eyes: Negative for visual disturbance  Respiratory: Negative for cough, chest tightness, shortness of breath and wheezing  Cardiovascular: Negative for chest pain and palpitations  Gastrointestinal: Negative for blood in stool, constipation, diarrhea, nausea and vomiting  Genitourinary: Negative for hematuria  Musculoskeletal: Positive for back pain (lumbar back)  Negative for gait problem, joint swelling and myalgias     Skin: Positive for color change (bruising bilateral lumbar back continues to bilateral buttock)  Negative for rash  Neurological: Negative for dizziness, tremors, seizures, syncope, facial asymmetry, speech difficulty, weakness, numbness and headaches  Hematological: Negative for adenopathy  Psychiatric/Behavioral: Negative for agitation and confusion  Objective:      /82 (BP Location: Left arm, Patient Position: Sitting, Cuff Size: Large)   Pulse 82   Temp 98 4 °F (36 9 °C) (Tympanic)   Resp 16   Ht 5' 4" (1 626 m)   Wt 72 6 kg (160 lb)   SpO2 98%   BMI 27 46 kg/m²          Physical Exam  Vitals and nursing note reviewed  Constitutional:       General: She is not in acute distress  Appearance: Normal appearance  She is not ill-appearing, toxic-appearing or diaphoretic  HENT:      Head: Normocephalic  Laceration present  Right Ear: Tympanic membrane, ear canal and external ear normal  There is no impacted cerumen  Left Ear: Tympanic membrane, ear canal and external ear normal  There is no impacted cerumen  Nose: Congestion present  No rhinorrhea  Mouth/Throat:      Mouth: Mucous membranes are moist       Pharynx: Oropharynx is clear  Posterior oropharyngeal erythema present  No oropharyngeal exudate  Eyes:      General: No scleral icterus  Right eye: No discharge  Left eye: No discharge  Extraocular Movements: Extraocular movements intact  Conjunctiva/sclera: Conjunctivae normal       Pupils: Pupils are equal, round, and reactive to light  Cardiovascular:      Rate and Rhythm: Normal rate and regular rhythm  Pulses: Normal pulses  Heart sounds: Normal heart sounds  No murmur heard  Pulmonary:      Effort: Pulmonary effort is normal  No respiratory distress  Breath sounds: Normal breath sounds  No wheezing, rhonchi or rales  Abdominal:      General: Abdomen is flat  Palpations: Abdomen is soft  Tenderness:  There is no abdominal tenderness  Lymphadenopathy:      Cervical: No cervical adenopathy  Skin:     General: Skin is warm and dry  Capillary Refill: Capillary refill takes less than 2 seconds  Findings: Bruising (bilateral lumbar back to bilateral buttocks) present  Comments: In various stages of healing     Neurological:      General: No focal deficit present  Mental Status: She is alert and oriented to person, place, and time  Cranial Nerves: No cranial nerve deficit  Sensory: No sensory deficit  Motor: No weakness  Coordination: Coordination normal       Gait: Gait normal    Psychiatric:         Mood and Affect: Mood normal          Behavior: Behavior normal          Thought Content: Thought content normal          Judgment: Judgment normal          Suture removal    Date/Time: 4/5/2022 2:35 PM  Performed by: SACHA Crowell  Authorized by: SACHA Crowell   Universal Protocol:  Procedure performed by: (MA)  Consent: Verbal consent obtained  Risks and benefits: risks, benefits and alternatives were discussed  Consent given by: patient  Time out: Immediately prior to procedure a "time out" was called to verify the correct patient, procedure, equipment, support staff and site/side marked as required  Timeout called at: 4/5/2022 2:33 PM   Patient understanding: patient states understanding of the procedure being performed  Patient consent: the patient's understanding of the procedure matches consent given  Procedure consent: procedure consent matches procedure scheduled  Patient identity confirmed: verbally with patient        Patient location:  Clinic  Location:     Laterality:  Left    Location:  [address] [facility] location:  Scalp  Procedure details:      Tools used:  Tweezers and scissors    Wound appearance:  No sign(s) of infection, good wound healing and clean    Number of sutures removed:  10  Post-procedure details:     Post-removal: Antibiotic ointment applied    Patient tolerance of procedure: Tolerated well, no immediate complications      BMI Counseling: Body mass index is 27 46 kg/m²  The BMI is above normal  Nutrition recommendations include reducing portion sizes, 3-5 servings of fruits/vegetables daily and reducing fast food intake  Exercise recommendations include moderate aerobic physical activity for 150 minutes/week

## 2022-04-06 ENCOUNTER — TELEPHONE (OUTPATIENT)
Dept: FAMILY MEDICINE CLINIC | Facility: CLINIC | Age: 67
End: 2022-04-06

## 2022-04-06 PROBLEM — Z83.3 FAMILY HISTORY OF DIABETES MELLITUS (DM): Status: ACTIVE | Noted: 2022-04-06

## 2022-04-06 NOTE — ASSESSMENT & PLAN NOTE
FH DM in father and paternal grandmother  Denies polyphagia, polydipsia, polyuria   Will recommend CMP, fasting insulin, and hgb A1c, orders placed

## 2022-04-06 NOTE — TELEPHONE ENCOUNTER
Sleep hygiene- going to bed and waking up at the same time every day, avoid caffeine products 4-6 hours prior to bedtime, only use bed for sleeping not reading/watching tv, stay away from electronics close to bedtime, start bedtime routine such as shower/bath 1 hour prior, drink warm milk/decaf tea  If patient would like to discuss further  or medications would need to schedule appt

## 2022-04-13 ENCOUNTER — RA CDI HCC (OUTPATIENT)
Dept: OTHER | Facility: HOSPITAL | Age: 67
End: 2022-04-13

## 2022-04-13 NOTE — PROGRESS NOTES
[facility]  coding opportunities       Chart reviewed, no opportunity found:   [address]        Patients Insurance     Medicare Insurance: [employer]

## 2022-04-19 ENCOUNTER — OFFICE VISIT (OUTPATIENT)
Dept: FAMILY MEDICINE CLINIC | Facility: CLINIC | Age: 67
End: 2022-04-19
Payer: COMMERCIAL

## 2022-04-19 VITALS
BODY MASS INDEX: 27.32 KG/M2 | DIASTOLIC BLOOD PRESSURE: 84 MMHG | TEMPERATURE: 98.3 F | SYSTOLIC BLOOD PRESSURE: 140 MMHG | OXYGEN SATURATION: 97 % | HEIGHT: 65 IN | WEIGHT: 164 LBS | HEART RATE: 64 BPM

## 2022-04-19 DIAGNOSIS — S06.6X0D SUBARACHNOID HEMORRHAGE FOLLOWING INJURY, NO LOSS OF CONSCIOUSNESS, SUBSEQUENT ENCOUNTER: ICD-10-CM

## 2022-04-19 DIAGNOSIS — S01.01XD LACERATION OF SCALP, SUBSEQUENT ENCOUNTER: Primary | ICD-10-CM

## 2022-04-19 DIAGNOSIS — Z23 NEED FOR PNEUMOCOCCAL VACCINATION: ICD-10-CM

## 2022-04-19 DIAGNOSIS — J30.2 SEASONAL ALLERGIES: ICD-10-CM

## 2022-04-19 PROCEDURE — 3288F FALL RISK ASSESSMENT DOCD: CPT | Performed by: NURSE PRACTITIONER

## 2022-04-19 PROCEDURE — 4040F PNEUMOC VAC/ADMIN/RCVD: CPT | Performed by: NURSE PRACTITIONER

## 2022-04-19 PROCEDURE — 99214 OFFICE O/P EST MOD 30 MIN: CPT | Performed by: NURSE PRACTITIONER

## 2022-04-19 PROCEDURE — 3725F SCREEN DEPRESSION PERFORMED: CPT | Performed by: NURSE PRACTITIONER

## 2022-04-19 PROCEDURE — 1100F PTFALLS ASSESS-DOCD GE2>/YR: CPT | Performed by: NURSE PRACTITIONER

## 2022-04-19 PROCEDURE — G0009 ADMIN PNEUMOCOCCAL VACCINE: HCPCS

## 2022-04-19 PROCEDURE — 3008F BODY MASS INDEX DOCD: CPT | Performed by: NURSE PRACTITIONER

## 2022-04-19 PROCEDURE — 1160F RVW MEDS BY RX/DR IN RCRD: CPT | Performed by: NURSE PRACTITIONER

## 2022-04-19 PROCEDURE — 1036F TOBACCO NON-USER: CPT | Performed by: NURSE PRACTITIONER

## 2022-04-19 PROCEDURE — 90732 PPSV23 VACC 2 YRS+ SUBQ/IM: CPT

## 2022-04-19 RX ORDER — OMEGA-3 FATTY ACIDS/FISH OIL 300-1000MG
2 CAPSULE ORAL 3 TIMES DAILY PRN
COMMUNITY

## 2022-04-19 NOTE — ASSESSMENT & PLAN NOTE
Acute symptomatic patient had fall down basement steps 3/25/2022, [facility] 3[facility]25[facility]3/27 [facility]  Laceration on top left side of scalp removed ten sutures at last visit  Healing well, no s/s infection noted

## 2022-04-19 NOTE — PROGRESS NOTES
Assessment/Plan:    Scalp laceration  Acute symptomatic patient had fall down basement steps 3/25/2022, hospitalized 3/25-3/27 in [facility]  Laceration on top left side of scalp removed ten sutures at last visit  Healing well, no s/s infection noted  Seasonal allergies  Symptomatic with chronic history of seasonal allergies stopped medications when had fall and hospitalized  Will recommend restart claritin 10mg daily and flonase 2 spray each nostril once daily  Educated on s/e and proper use of medication  Subarachnoid hemorrhage following injury, no loss of consciousness ([facility] [address] )  Fall down steps 3/25/2022, hospitalized 3/25/2022-3/27/2022  Awaiting records transferred  Denies LOC, headache, change of vision, N/V/D, fever, chills, mental status change, chest pain, sob  Patient is scheduled with neurosurgeon for 5/3/2022 in [facility] transportation and is trying to reschedule closer to home, awaiting return call  Will recommend reschedule or do not miss appt  Diagnoses and all orders for this visit:    Laceration of scalp, subsequent encounter    Seasonal allergies    Subarachnoid hemorrhage following injury, no loss of consciousness, subsequent encounter    Need for pneumococcal vaccination  Comments:  Educated on s/e and contraindications-patient agrees to immunization  Orders:  -     PNEUMOCOCCAL POLYSACCHARIDE VACCINE 23-VALENT =>3YO SQ IM    Other orders  -     Ibuprofen (Advil) 200 MG CAPS; Take 2 capsules by mouth 3 (three) times a day as needed  -     Multiple Vitamins-Minerals (Centrum Silver 50+Women) TABS; Take 1 tablet by mouth daily        Subjective:      Patient ID: Peace Dolan is a 74 y o  female  Patient arrives for f/u after fall  Patient reports much improvement  Continues with slight scab on left scalp, denies s/s infection  Is scheduled with neuro surgeon 5/4/2022 but attempting to schedule closer to home         The following portions of the patient's history were reviewed and updated as appropriate: allergies, current medications, past family history, past medical history, past social history, past surgical history and problem list     Review of Systems   Constitutional: Negative for activity change, appetite change, fatigue and fever  HENT: Positive for rhinorrhea and sneezing  Negative for congestion and sore throat  Respiratory: Negative for cough, chest tightness, shortness of breath and wheezing  Cardiovascular: Negative for chest pain  Gastrointestinal: Negative for blood in stool, constipation, diarrhea, nausea and vomiting  Genitourinary: Negative for hematuria  Neurological: Negative for dizziness, facial asymmetry, speech difficulty, weakness, numbness and headaches  Hematological: Negative for adenopathy  Psychiatric/Behavioral: Negative for agitation and confusion  Objective:      /84   Pulse 64   Temp 98 3 °F (36 8 °C) (Tympanic)   Ht 5' 5" (1 651 m)   Wt 74 4 kg (164 lb)   SpO2 97%   Breastfeeding No   BMI 27 29 kg/m²          Physical Exam  Vitals and nursing note reviewed  Constitutional:       General: She is not in acute distress  Appearance: Normal appearance  She is not ill-appearing or diaphoretic  HENT:      Head: Normocephalic and atraumatic  Right Ear: Tympanic membrane, ear canal and external ear normal  There is no impacted cerumen  Left Ear: Tympanic membrane, ear canal and external ear normal  There is no impacted cerumen  Nose: No congestion or rhinorrhea  Mouth/Throat:      Pharynx: No posterior oropharyngeal erythema  Eyes:      General: No scleral icterus  Right eye: No discharge  Left eye: No discharge  Conjunctiva/sclera: Conjunctivae normal    Cardiovascular:      Rate and Rhythm: Normal rate and regular rhythm  Pulmonary:      Effort: Pulmonary effort is normal  No respiratory distress        Breath sounds: Normal breath sounds  No wheezing, rhonchi or rales  Abdominal:      General: Abdomen is flat  Palpations: Abdomen is soft  Tenderness: There is no abdominal tenderness  There is no guarding  Musculoskeletal:         General: Normal range of motion  Cervical back: Normal range of motion  Right lower leg: No edema  Left lower leg: No edema  Skin:     Coloration: Skin is not jaundiced or pale  Findings: No bruising, erythema or rash  Neurological:      General: No focal deficit present  Mental Status: She is alert and oriented to person, place, and time  Cranial Nerves: No cranial nerve deficit  Sensory: No sensory deficit  Motor: No weakness  Coordination: Coordination normal       Gait: Gait normal    Psychiatric:         Mood and Affect: Mood normal          Behavior: Behavior normal          Thought Content:  Thought content normal          Judgment: Judgment normal

## 2022-04-20 NOTE — ASSESSMENT & PLAN NOTE
Symptomatic with chronic history of seasonal allergies stopped medications when had fall and hospitalized  Will recommend restart claritin 10mg daily and flonase 2 spray each nostril once daily  Educated on s/e and proper use of medication

## 2022-04-20 NOTE — ASSESSMENT & PLAN NOTE
Fall down steps 3/25/2022, hospitalized 3/25/2022-3/27/2022  Awaiting records transferred  Denies LOC, headache, change of vision, N/V/D, fever, chills, mental status change, chest pain, sob  Patient is scheduled with neurosurgeon for 5/3/2022 in [place]  Lacking transportation and is trying to reschedule closer to home, awaiting return call  Will recommend reschedule or do not miss appt

## 2022-05-27 ENCOUNTER — OFFICE VISIT (OUTPATIENT)
Dept: FAMILY MEDICINE CLINIC | Facility: CLINIC | Age: 67
End: 2022-05-27
Payer: COMMERCIAL

## 2022-05-27 VITALS
OXYGEN SATURATION: 99 % | TEMPERATURE: 98.9 F | HEIGHT: 65 IN | HEART RATE: 100 BPM | WEIGHT: 157 LBS | DIASTOLIC BLOOD PRESSURE: 90 MMHG | SYSTOLIC BLOOD PRESSURE: 170 MMHG | BODY MASS INDEX: 26.16 KG/M2

## 2022-05-27 DIAGNOSIS — Z00.00 MEDICARE ANNUAL WELLNESS VISIT, INITIAL: Primary | ICD-10-CM

## 2022-05-27 DIAGNOSIS — E66.3 OVERWEIGHT WITH BODY MASS INDEX (BMI) OF 26 TO 26.9 IN ADULT: ICD-10-CM

## 2022-05-27 DIAGNOSIS — J01.00 ACUTE NON-RECURRENT MAXILLARY SINUSITIS: ICD-10-CM

## 2022-05-27 DIAGNOSIS — I10 PRIMARY HYPERTENSION: ICD-10-CM

## 2022-05-27 DIAGNOSIS — H10.13 ALLERGIC CONJUNCTIVITIS OF BOTH EYES: ICD-10-CM

## 2022-05-27 PROBLEM — J30.9 ALLERGIC RHINITIS DUE TO ALLERGEN: Status: ACTIVE | Noted: 2022-05-27

## 2022-05-27 PROCEDURE — 3008F BODY MASS INDEX DOCD: CPT | Performed by: FAMILY MEDICINE

## 2022-05-27 PROCEDURE — 1170F FXNL STATUS ASSESSED: CPT | Performed by: FAMILY MEDICINE

## 2022-05-27 PROCEDURE — 3725F SCREEN DEPRESSION PERFORMED: CPT | Performed by: FAMILY MEDICINE

## 2022-05-27 PROCEDURE — 1100F PTFALLS ASSESS-DOCD GE2>/YR: CPT | Performed by: FAMILY MEDICINE

## 2022-05-27 PROCEDURE — 1160F RVW MEDS BY RX/DR IN RCRD: CPT | Performed by: FAMILY MEDICINE

## 2022-05-27 PROCEDURE — 3288F FALL RISK ASSESSMENT DOCD: CPT | Performed by: FAMILY MEDICINE

## 2022-05-27 PROCEDURE — G0438 PPPS, INITIAL VISIT: HCPCS | Performed by: FAMILY MEDICINE

## 2022-05-27 PROCEDURE — 1003F LEVEL OF ACTIVITY ASSESS: CPT | Performed by: FAMILY MEDICINE

## 2022-05-27 PROCEDURE — 1125F AMNT PAIN NOTED PAIN PRSNT: CPT | Performed by: FAMILY MEDICINE

## 2022-05-27 PROCEDURE — 99214 OFFICE O/P EST MOD 30 MIN: CPT | Performed by: FAMILY MEDICINE

## 2022-05-27 PROCEDURE — 1036F TOBACCO NON-USER: CPT | Performed by: FAMILY MEDICINE

## 2022-05-27 PROCEDURE — 1090F PRES/ABSN URINE INCON ASSESS: CPT | Performed by: FAMILY MEDICINE

## 2022-05-27 RX ORDER — OLOPATADINE HYDROCHLORIDE 1 MG/ML
1 SOLUTION/ DROPS OPHTHALMIC DAILY
Qty: 5 ML | Refills: 0 | Status: SHIPPED | OUTPATIENT
Start: 2022-05-27

## 2022-05-27 RX ORDER — AMOXICILLIN 500 MG/1
500 CAPSULE ORAL EVERY 8 HOURS SCHEDULED
Qty: 21 CAPSULE | Refills: 0 | Status: SHIPPED | OUTPATIENT
Start: 2022-05-27 | End: 2022-06-03

## 2022-05-27 RX ORDER — CALCIUM CARBONATE/VITAMIN D3 600 MG-20
1 TABLET ORAL DAILY
COMMUNITY

## 2022-05-27 NOTE — ASSESSMENT & PLAN NOTE
Acute symptomatic with erythema and itchy ,recomened Pataday one drop/day proper use and possible side effect discuss with patient

## 2022-05-27 NOTE — PROGRESS NOTES
Assessment and Plan:     Problem List Items Addressed This Visit        Respiratory    Acute non-recurrent maxillary sinusitis     Acute symptomatic not respond to conservative TX   Start Amoxicillin 500 mg po TID for 7 days proper use and possible side effect discuss with patient           Relevant Medications    amoxicillin (AMOXIL) 500 mg capsule       Cardiovascular and Mediastinum    Primary hypertension     BP uncontrol Asymptomatic per patient she got high blood pressure in DR office   recommend to patient to monitor and keep log number to bring in next visit                Other    Medicare annual wellness visit, initial - Primary     Advice and education were given regarding nutrition, aerobic exercises, weight bearing exercises, cardiovascular risk reduction, fall risk reduction, and age appropriate supplements  The patient was counseled regarding instructions for management, risk factor reductions, prognosis, risks and benefits of treatment options, patient and family education, and importance of compliance with treatment  Patient already has order for mammogram /Dexa scan   Already has Cologuard at home not done yet           Overweight with body mass index (BMI) of 26 to 26 9 in adult     BMI 26 53 ,portion control ,low carb low fat diet discuss with patient increase physical activity           Allergic conjunctivitis of both eyes     Acute symptomatic with erythema and itchy ,recomened Pataday one drop/day proper use and possible side effect discuss with patient           Relevant Medications    olopatadine (PATANOL) 0 1 % ophthalmic solution        BMI Counseling: Body mass index is 26 53 kg/m²  The BMI is above normal  Nutrition recommendations include decreasing portion sizes, decreasing fast food intake and limiting drinks that contain sugar  Exercise recommendations include exercising 3-5 times per week  Rationale for BMI follow-up plan is due to patient being overweight or obese  Depression Screening and Follow-up Plan: Patient was screened for depression during today's encounter  They screened negative with a PHQ-2 score of 0  Falls Plan of Care: balance, strength, and gait training instructions were provided  Preventive health issues were discussed with patient, and age appropriate screening tests were ordered as noted in patient's After Visit Summary  Personalized health advice and appropriate referrals for health education or preventive services given if needed, as noted in patient's After Visit Summary  History of Present Illness:     Patient presents for Medicare Annual Wellness visit    Patient Care Team:  Sirisha Baptiste as PCP - General (Family Medicine)     Problem List:     Patient Active Problem List   Diagnosis    [facility]ach[facility] hemorrhage following [facility], no loss of [facility]    Scalp laceration    Contusion of left side of back    Recurrent sinusitis    Family history of diabetes mellitus (DM)    Seasonal allergies    Medicare annual wellness visit, initial    Overweight with body mass index (BMI) of 26 to 26 9 in adult    Primary hypertension    Acute non-recurrent maxillary sinusitis    Allergic conjunctivitis of both eyes      Past Medical and Surgical History:     Past Medical History:   Diagnosis Date    History of ovarian cyst     [facility]anoma of shoulder, right ([facility] )      Past Surgical History:   Procedure Laterality Date    OVARIAN CYST REMOVAL Left       Family History:     Family History   Problem Relation Age of Onset    No Known Problems Mother     Diabetes Father     Heart murmur Father     No Known Problems Son     No Known Problems Son       Social History:     Social History     Socioeconomic History    Marital status:       Spouse name: None    Number of children: None    Years of education: None    Highest education level: None   Occupational History    None   Tobacco Use    Smoking status: Former Smoker     Packs/day: 0 50     Years: 31 00     Pack years: 15 50     Types: Cigarettes     Start date:      Quit date: 2016     Years since quittin 7    Smokeless tobacco: Never Used   Vaping Use    Vaping Use: Never used   Substance and Sexual Activity    Alcohol use: Yes     Alcohol/week: 2 0 standard drinks     Types: 1 Cans of beer, 1 Glasses of wine per week    Drug use: Not Currently    Sexual activity: Not Currently   Other Topics Concern    None   Social History Narrative    None     Social Determinants of Health     Financial Resource Strain: Low Risk     Difficulty of Paying Living Expenses: Not hard at all   Food Insecurity: No Food Insecurity    Worried About Running Out of Food in the Last Year: Never true    Christi of Food in the Last Year: Never true   Transportation Needs: No Transportation Needs    Lack of Transportation (Medical): No    Lack of Transportation (Non-Medical): No   Physical Activity: Sufficiently Active    Days of Exercise per Week: 7 days    Minutes of Exercise per Session: 30 min   Stress: No Stress Concern Present    Feeling of Stress : Not at all   Social Connections: Moderately Isolated    Frequency of Communication with Friends and Family: More than three times a week    Frequency of Social Gatherings with Friends and Family: More than three times a week    Attends Church Services: More than 4 times per year    Active Member of Clubs or Organizations: No    Attends Club or Organization Meetings: Never    Marital Status:     Intimate Partner Violence: Not At Risk    Fear of Current or Ex-Partner: No    Emotionally Abused: No    Physically Abused: No    Sexually Abused: No   Housing Stability: Unknown    Unable to Pay for Housing in the Last Year: No    Number of Places Lived in the Last Year: Not on file    Unstable Housing in the Last Year: No      Medications and Allergies:     Current Outpatient Medications   Medication Sig Dispense Refill    amoxicillin (AMOXIL) 500 mg capsule Take 1 capsule (500 mg total) by mouth every 8 (eight) hours for 7 days 21 capsule 0    Calcium Carb-Cholecalciferol (Caltrate 600+D3) 600-800 MG-UNIT TABS Take 1 tablet by mouth in the morning      olopatadine (PATANOL) 0 1 % ophthalmic solution Administer 1 drop to both eyes in the morning 5 mL 0    fluticasone (FLONASE) 50 mcg/act nasal spray 2 sprays into each nostril daily      Ibuprofen (Advil) 200 MG CAPS Take 2 capsules by mouth 3 (three) times a day as needed      loratadine (CLARITIN) 10 mg tablet Take 10 mg by mouth daily      Multiple Vitamins-Minerals (Centrum Silver 50+Women) TABS Take 1 tablet by mouth daily       No current facility-administered medications for this visit  No Known Allergies   Immunizations:     Immunization History   Administered Date(s) Administered    COVID-19 PFIZER VACCINE 0 3 ML IM 03/06/2021, 03/27/2021, 10/13/2021    Pneumococcal Polysaccharide PPV23 04/19/2022    Tdap 03/24/2022      Health Maintenance:         Topic Date Due    Hepatitis C Screening  Never done    Breast Cancer Screening: Mammogram  Never done    Colorectal Cancer Screening  Never done         Topic Date Due    COVID-19 Vaccine (4 - Booster for Mcclendon Peter series) 02/13/2022      Medicare Health Risk Assessment:     /90   Pulse 100   Temp 98 9 °F (37 2 °C) (Tympanic)   Ht 5' 4 5" (1 638 m)   Wt 71 2 kg (157 lb)   SpO2 99%   Breastfeeding No   BMI 26 53 kg/m²      Fitz Vera is here for her Initial Wellness visit  Last Medicare Wellness visit information reviewed, patient interviewed and updates made to the record today  Health Risk Assessment:   Patient rates overall health as excellent  Patient feels that their physical health rating is same  Patient is very satisfied with their life  Eyesight was rated as same  Hearing was rated as same  Patient feels that their emotional and mental health rating is same   Patients states they are never, rarely angry  Patient states they are never, rarely unusually tired/fatigued  Pain experienced in the last 7 days has been some  Patient's pain rating has been 3/10  Patient states that she has experienced no weight loss or gain in last 6 months  Depression Screening:   PHQ-2 Score: 0      Fall Risk Screening: In the past year, patient has experienced: history of falling in past year    Number of falls: 1  Injured during fall?: Yes    Feels unsteady when standing or walking?: No    Worried about falling?: No      Urinary Incontinence Screening:   Patient has not leaked urine accidently in the last six months  Home Safety:  Patient does not have trouble with stairs inside or outside of their home  Patient has working smoke alarms and has working carbon monoxide detector  Home safety hazards include: none  Nutrition:   Current diet is Regular  Medications:   Patient is currently taking over-the-counter supplements  OTC medications include: see medication list  Patient is able to manage medications  Activities of Daily Living (ADLs)/Instrumental Activities of Daily Living (IADLs):   Walk and transfer into and out of bed and chair?: Yes  Dress and groom yourself?: Yes    Bathe or shower yourself?: Yes    Feed yourself?  Yes  Do your laundry/housekeeping?: Yes  Manage your money, pay your bills and track your expenses?: Yes  Make your own meals?: Yes    Do your own shopping?: Yes    Durable Medical Equipment Suppliers  none    Previous Hospitalizations:   Any hospitalizations or ED visits within the last 12 months?: Yes    How many hospitalizations have you had in the last year?: 1-2    Advance Care Planning:   Living will: No    Durable POA for healthcare: No    Advanced directive: No    Advanced directive counseling given: No    Five wishes given: Yes    Patient declined ACP directive: No    End of Life Decisions reviewed with patient: No    Provider agrees with end of life decisions: Yes      Cognitive Screening:   Provider or family/friend/caregiver concerned regarding cognition?: No    PREVENTIVE SCREENINGS      Cardiovascular Screening:    General: Risks and Benefits Discussed    Due for: Lipid Panel      Diabetes Screening:     General: Risks and Benefits Discussed    Due for: Blood Glucose      Colorectal Cancer Screening:     General: Risks and Benefits Discussed    Due for: Cologuard      Breast Cancer Screening:     General: Risks and Benefits Discussed    Due for: Mammogram        Cervical Cancer Screening:    General: Screening Not Indicated      Osteoporosis Screening:    General: Risks and Benefits Discussed      Abdominal Aortic Aneurysm (AAA) Screening:        General: Screening Not Indicated      Lung Cancer Screening:     General: Screening Not Indicated      Hepatitis C Screening:    General: Risks and Benefits Discussed    Hep C Screening Accepted: Yes      Screening, Brief Intervention, and Referral to Treatment (SBIRT)    Screening  Typical number of drinks in a day: 0  Typical number of drinks in a week: 0  Interpretation: Low risk drinking behavior      AUDIT-C Screenin) How often did you have a drink containing alcohol in the past year? never  2) How many drinks did you have on a typical day when you were drinking in the past year? 0  3) How often did you have 6 or more drinks on one occasion in the past year? never    AUDIT-C Score: 0  Interpretation: Score 0-2 (female): Negative screen for alcohol misuse    Single Item Drug Screening:  How often have you used an illegal drug (including marijuana) or a prescription medication for non-medical reasons in the past year? never    Single Item Drug Screen Score: 0  Interpretation: Negative screen for possible drug use disorder      James Rivera MD

## 2022-05-27 NOTE — ASSESSMENT & PLAN NOTE
BP uncontrol Asymptomatic per patient she got high blood pressure in DR office   recommend to patient to monitor and keep log number to bring in next visit

## 2022-05-27 NOTE — ASSESSMENT & PLAN NOTE
Acute symptomatic not respond to conservative TX   Start Amoxicillin 500 mg po TID for 7 days proper use and possible side effect discuss with patient

## 2022-05-27 NOTE — PATIENT INSTRUCTIONS
Medicare Preventive Visit Patient Instructions  Thank you for completing your Welcome to Medicare Visit or Medicare Annual Wellness Visit today  Your next wellness visit will be due in one year (5/28/2023)  The screening/preventive services that you may require over the next 5-10 years are detailed below  Some tests may not apply to you based off risk factors and/or age  Screening tests ordered at today's visit but not completed yet may show as past due  Also, please note that scanned in results may not display below  Preventive Screenings:  Service Recommendations Previous Testing/Comments   Colorectal Cancer Screening  * Colonoscopy    * Fecal Occult Blood Test (FOBT)/Fecal Immunochemical Test (FIT)  * Fecal DNA/Cologuard Test  * Flexible Sigmoidoscopy Age: 54-65 years old   Colonoscopy: every 10 years (may be performed more frequently if at higher risk)  OR  FOBT/FIT: every 1 year  OR  Cologuard: every 3 years  OR  Sigmoidoscopy: every 5 years  Screening may be recommended earlier than age 48 if at higher risk for colorectal cancer  Also, an individualized decision between you and your healthcare provider will decide whether screening between the ages of 74-80 would be appropriate  Colonoscopy: Not on file  FOBT/FIT: Not on file  Cologuard: Not on file  Sigmoidoscopy: Not on file          Breast Cancer Screening Age: 36 years old  Frequency: every 1-2 years  Not required if history of left and right mastectomy Mammogram: Not on file        Cervical Cancer Screening Between the ages of 21-29, pap smear recommended once every 3 years  Between the ages of 33-67, can perform pap smear with HPV co-testing every 5 years     Recommendations may differ for women with a history of total hysterectomy, cervical cancer, or abnormal pap smears in past  Pap Smear: Not on file    Screening Not Indicated   Hepatitis C Screening Once for adults born between [facility]  More frequently in patients at high risk for Hepatitis C Hep C Antibody: Not on file        Diabetes Screening 1-2 times per year if you're at risk for diabetes or have pre-diabetes Fasting glucose: No results in last 5 years   A1C: No results in last 5 years        Cholesterol Screening Once every 5 years if you don't have a lipid disorder  May order more often based on risk factors  Lipid panel: Not on file          Other Preventive Screenings Covered by Medicare:  1  Abdominal Aortic Aneurysm (AAA) Screening: covered once if your at risk  You're considered to be at risk if you have a family history of AAA  2  Lung Cancer Screening: covers low dose CT scan once per year if you meet all of the following conditions: (1) Age 50-69; (2) No signs or symptoms of lung cancer; (3) Current smoker or have quit smoking within the last 15 years; (4) You have a tobacco smoking history of at least 30 pack years (packs per day multiplied by number of years you smoked); (5) You get a written order from a healthcare provider  3  Glaucoma Screening: covered annually if you're considered high risk: (1) You have diabetes OR (2) Family history of glaucoma OR (3)  aged 48 and older OR (3)  American aged 72 and older  3  Osteoporosis Screening: covered every 2 years if you meet one of the following conditions: (1) You're estrogen deficient and at risk for osteoporosis based off medical history and other findings; (2) Have a vertebral abnormality; (3) On glucocorticoid therapy for more than 3 months; (4) Have primary hyperparathyroidism; (5) On osteoporosis medications and need to assess response to drug therapy  · Last bone density test (DXA Scan): Not on file  5  HIV Screening: covered annually if you're between the age of 12-76  Also covered annually if you are younger than 13 and older than 72 with risk factors for HIV infection  For pregnant patients, it is covered up to 3 times per pregnancy      Immunizations:  Immunization Recommendations Influenza Vaccine Annual influenza vaccination during flu season is recommended for all persons aged >= 6 months who do not have contraindications   Pneumococcal Vaccine (Prevnar and Pneumovax)  * Prevnar = PCV13  * Pneumovax = PPSV23   Adults 25-60 years old: 1-3 doses may be recommended based on certain risk factors  Adults 72 years old: Prevnar (PCV13) vaccine recommended followed by Pneumovax (PPSV23) vaccine  If already received PPSV23 since turning 65, then PCV13 recommended at least one year after PPSV23 dose  Hepatitis B Vaccine 3 dose series if at intermediate or high risk (ex: diabetes, end stage renal disease, liver disease)   Tetanus (Td) Vaccine - COST NOT COVERED BY MEDICARE PART B Following completion of primary series, a booster dose should be given every 10 years to maintain immunity against tetanus  Td may also be given as tetanus wound prophylaxis  Tdap Vaccine - COST NOT COVERED BY MEDICARE PART B Recommended at least once for all adults  For pregnant patients, recommended with each pregnancy  Shingles Vaccine (Shingrix) - COST NOT COVERED BY MEDICARE PART B  2 shot series recommended in those aged 48 and above     Health Maintenance Due:      Topic Date Due    Hepatitis C Screening  Never done    Breast Cancer Screening: Mammogram  Never done    Colorectal Cancer Screening  Never done     Immunizations Due:      Topic Date Due    COVID-19 Vaccine (4 - Booster for Mcclendon Peter series) 02/13/2022     Advance Directives   What are advance directives? Advance directives are legal documents that state your wishes and plans for medical care  These plans are made ahead of time in case you lose your ability to make decisions for yourself  Advance directives can apply to any medical decision, such as the treatments you want, and if you want to donate organs  What are the types of advance directives? There are many types of advance directives, and each state has rules about how to use them   You may choose a combination of any of the following:  · Living will: This is a written record of the treatment you want  You can also choose which treatments you do not want, which to limit, and which to stop at a certain time  This includes surgery, medicine, IV fluid, and tube feedings  · Durable power of  for healthcare [facility]): This is a written record that states who you want to make healthcare choices for you when you are unable to make them for yourself  This person, called a proxy, is usually a family member or a friend  You may choose more than 1 proxy  · Do not resuscitate (DNR) order:  A DNR order is used in case your heart stops beating or you stop breathing  It is a request not to have certain forms of treatment, such as CPR  A DNR order may be included in other types of advance directives  · Medical directive: This covers the care that you want if you are in a coma, near death, or unable to make decisions for yourself  You can list the treatments you want for each condition  Treatment may include pain medicine, surgery, blood transfusions, dialysis, IV or tube feedings, and a ventilator (breathing machine)  · Values history: This document has questions about your views, beliefs, and how you feel and think about life  This information can help others choose the care that you would choose  Why are advance directives important? An advance directive helps you control your care  Although spoken wishes may be used, it is better to have your wishes written down  Spoken wishes can be misunderstood, or not followed  Treatments may be given even if you do not want them  An advance directive may make it easier for your family to make difficult choices about your care  Fall Prevention    Fall prevention  includes ways to make your home and other areas safer  It also includes ways you can move more carefully to prevent a fall   Health conditions that cause changes in your blood pressure, vision, or muscle strength and coordination may increase your risk for falls  Medicines may also increase your risk for falls if they make you dizzy, weak, or sleepy  Fall prevention tips:   · Stand or sit up slowly  · Use assistive devices as directed  · Wear shoes that fit well and have soles that   · Wear a personal alarm  · Stay active  · Manage your medical conditions  Home Safety Tips:  · Add items to prevent falls in the bathroom  · Keep paths clear  · Install bright lights in your home  · Keep items you use often on shelves within reach  · Paint or place reflective tape on the edges of your stairs  Weight Management   Why it is important to manage your weight:  Being overweight increases your risk of health conditions such as heart disease, high blood pressure, type 2 diabetes, and certain types of cancer  It can also increase your risk for osteoarthritis, sleep apnea, and other respiratory problems  Aim for a slow, steady weight loss  Even a small amount of weight loss can lower your risk of health problems  How to lose weight safely:  A safe and healthy way to lose weight is to eat fewer calories and get regular exercise  You can lose up about 1 pound a week by decreasing the number of calories you eat by 500 calories each day  Healthy meal plan for weight management:  A healthy meal plan includes a variety of foods, contains fewer calories, and helps you stay healthy  A healthy meal plan includes the following:  · Eat whole-grain foods more often  A healthy meal plan should contain fiber  Fiber is the part of grains, fruits, and vegetables that is not broken down by your body  Whole-grain foods are healthy and provide extra fiber in your diet  Some examples of whole-grain foods are whole-wheat breads and pastas, oatmeal, brown rice, and bulgur  · Eat a variety of vegetables every day  Include dark, leafy greens such as spinach, kale, rika greens, and mustard greens  Eat yellow and orange vegetables such as carrots, sweet potatoes, and winter squash  · Eat a variety of fruits every day  Choose fresh or canned fruit (canned in its own juice or light syrup) instead of juice  Fruit juice has very little or no fiber  · Eat low-fat dairy foods  Drink fat-free (skim) milk or 1% milk  Eat fat-free yogurt and low-fat cottage cheese  Try low-fat cheeses such as mozzarella and other reduced-fat cheeses  · Choose meat and other protein foods that are low in fat  Choose beans or other legumes such as split peas or lentils  Choose fish, skinless poultry (chicken or turkey), or lean cuts of red meat (beef or pork)  Before you cook meat or poultry, cut off any visible fat  · Use less fat and oil  Try baking foods instead of frying them  Add less fat, such as margarine, sour cream, regular salad dressing and mayonnaise to foods  Eat fewer high-fat foods  Some examples of high-fat foods include french fries, doughnuts, ice cream, and cakes  · Eat fewer sweets  Limit foods and drinks that are high in sugar  This includes candy, cookies, regular soda, and sweetened drinks  Exercise:  Exercise at least 30 minutes per day on most days of the week  Some examples of exercise include walking, biking, dancing, and swimming  You can also fit in more physical activity by taking the stairs instead of the elevator or parking farther away from stores  Ask your healthcare provider about the best exercise plan for you  © Copyright [employer] 2018 Information is for End User's use only and may not be sold, redistributed or otherwise used for commercial purposes   All illustrations and images included in CareNotes® are the copyrighted property of A D A M , [employer]  or [address] [place] [address]

## 2022-05-27 NOTE — ASSESSMENT & PLAN NOTE
Advice and education were given regarding nutrition, aerobic exercises, weight bearing exercises, cardiovascular risk reduction, fall risk reduction, and age appropriate supplements  The patient was counseled regarding instructions for management, risk factor reductions, prognosis, risks and benefits of treatment options, patient and family education, and importance of compliance with treatment       Patient already has order for mammogram /Dexa scan   Already has Cologuard at home not done yet

## 2022-05-27 NOTE — PROGRESS NOTES
Depression Screening and Follow-up Plan: Patient was screened for depression during today's encounter  They screened negative with a PHQ-2 score of 0  Subjective:   Chief Complaint   Patient presents with    Medicare Wellness Visit        Patient ID: Jamal Falk is a 74 y o  female  The patient here for Medicare exam and she had concerned about sinus pressure sinus pain postnasal drip congestion [email or web address] has been going on for while patient she is leaving the and and the state and she has worry about the getting worse during her to a trip a no smoking patient also concerned about the redness itchy the bilateral watery eye no discharge no pain with movement of the eyeball no tearing no light sensitivity  Patient blood pressure is elevated per patient she when she come to see a doctor office she get anxious no blood pressure get high deny any chest pain short of breath no palpitation no lower extremity edema      The following portions of the patient's history were reviewed and updated as appropriate: allergies, current medications, past family history, past medical history, past social history, past surgical history and problem list     Review of Systems   Constitutional: Negative for activity change, appetite change, fatigue and fever  HENT: Positive for congestion, postnasal drip, sinus pressure and sinus pain  Negative for ear pain and sore throat  Eyes: Positive for redness and itching  Negative for pain and discharge  Respiratory: Negative for cough, chest tightness, shortness of breath and stridor  Cardiovascular: Negative for chest pain, palpitations and leg swelling  Gastrointestinal: Negative for abdominal pain, blood in stool, constipation, diarrhea and nausea  Genitourinary: Negative for dysuria, flank pain, frequency and hematuria  Musculoskeletal: Negative for back pain, joint swelling and neck pain  Skin: Negative for pallor and rash     Neurological: Negative for dizziness, tremors, weakness, numbness and headaches  Hematological: Does not bruise/bleed easily  Objective:  Vitals:    05/27/22 1158   BP: 170/90   Pulse: 100   Temp: 98 9 °F (37 2 °C)   TempSrc: Tympanic   SpO2: 99%   Weight: 71 2 kg (157 lb)   Height: 5' 4 5" (1 638 m)      Physical Exam  Vitals and nursing note reviewed  Constitutional:       General: She is not in acute distress  Appearance: She is well-developed  She is not diaphoretic  HENT:      Head: Normocephalic  Right Ear: Tympanic membrane, ear canal and external ear normal       Left Ear: Tympanic membrane, ear canal and external ear normal       Nose: Nose normal  No congestion or rhinorrhea  Mouth/Throat:      Mouth: Mucous membranes are moist       Pharynx: Oropharynx is clear  No oropharyngeal exudate or posterior oropharyngeal erythema  Eyes:      General:         Right eye: No discharge  Left eye: No discharge  Conjunctiva/sclera: Conjunctivae normal       Pupils: Pupils are equal, round, and reactive to light  Neck:      Vascular: No JVD  Cardiovascular:      Rate and Rhythm: Normal rate and regular rhythm  Heart sounds: Normal heart sounds  No murmur heard  No gallop  Pulmonary:      Effort: Pulmonary effort is normal  No respiratory distress  Breath sounds: Normal breath sounds  No stridor  No wheezing or rales  Chest:      Chest wall: No tenderness  Abdominal:      General: There is no distension  Palpations: Abdomen is soft  There is no mass  Tenderness: There is no abdominal tenderness  There is no rebound  Musculoskeletal:         General: No tenderness  Cervical back: Normal range of motion and neck supple  Lymphadenopathy:      Cervical: No cervical adenopathy  Skin:     General: Skin is warm  Findings: No erythema or rash  Neurological:      Mental Status: She is alert and oriented to person, place, and time  Motor: No weakness  Gait: Gait normal            Assessment/Plan:    Medicare annual wellness visit, initial  Advice and education were given regarding nutrition, aerobic exercises, weight bearing exercises, cardiovascular risk reduction, fall risk reduction, and age appropriate supplements  The patient was counseled regarding instructions for management, risk factor reductions, prognosis, risks and benefits of treatment options, patient and family education, and importance of compliance with treatment  Patient already has order for mammogram /Dexa scan   Already has Cologuard at home not done yet    Overweight with body mass index (BMI) of 26 to 26 9 in adult  BMI 26 53 ,portion control ,low carb low fat diet discuss with patient increase physical activity    Primary hypertension  BP uncontrol Asymptomatic per patient she got high blood pressure in DR office   recommend to patient to monitor and keep log number to bring in next visit      Acute non-recurrent maxillary sinusitis  Acute symptomatic not respond to conservative TX   Start Amoxicillin 500 mg po TID for 7 days proper use and possible side effect discuss with patient    Allergic conjunctivitis of both eyes  Acute symptomatic with erythema and itchy ,recomened Pataday one drop/day proper use and possible side effect discuss with patient       Diagnoses and all orders for this visit:    Medicare annual wellness visit, initial    Overweight with body mass index (BMI) of 26 to 26 9 in adult    Primary hypertension    Acute non-recurrent maxillary sinusitis  -     amoxicillin (AMOXIL) 500 mg capsule;  Take 1 capsule (500 mg total) by mouth every 8 (eight) hours for 7 days    Allergic conjunctivitis of both eyes  -     olopatadine (PATANOL) 0 1 % ophthalmic solution; Administer 1 drop to both eyes in the morning    Other orders  -     Calcium Carb-Cholecalciferol (Caltrate 600+D3) 600-800 MG-UNIT TABS; Take 1 tablet by mouth in the morning

## 2022-10-11 PROBLEM — J32.9 RECURRENT SINUSITIS: Status: RESOLVED | Noted: 2022-04-05 | Resolved: 2022-10-11

## 2022-10-11 PROBLEM — Z00.00 MEDICARE ANNUAL WELLNESS VISIT, INITIAL: Status: RESOLVED | Noted: 2022-05-27 | Resolved: 2022-10-11

## 2022-10-11 PROBLEM — J01.00 ACUTE NON-RECURRENT MAXILLARY SINUSITIS: Status: RESOLVED | Noted: 2022-05-27 | Resolved: 2022-10-11

## 2022-10-11 PROBLEM — S01.01XA SCALP LACERATION: Status: RESOLVED | Noted: 2022-04-05 | Resolved: 2022-10-11

## 2022-10-11 PROBLEM — H10.13 ALLERGIC CONJUNCTIVITIS OF BOTH EYES: Status: RESOLVED | Noted: 2022-05-27 | Resolved: 2022-10-11

## 2023-01-10 ENCOUNTER — TELEPHONE (OUTPATIENT)
Dept: FAMILY MEDICINE CLINIC | Facility: CLINIC | Age: 68
End: 2023-01-10

## 2023-01-10 NOTE — LETTER
January 10, 2023     [address] [id number]      Dear Ms Castro:    In addition to helping you feel better when you are sick, we are interested in preventing illness and injury in the first place  In the spirit of maintaining your good health, our system indicates that you are due for the following:    Health Maintenance Due   Topic Date Due   • Hepatitis C Screening  Never done   • Breast Cancer Screening: Mammogram  Never done   • Colorectal Cancer Screening  Never done   • Osteoporosis Screening  Never done   • COVID-19 Vaccine (4 - Booster for Pfizer series) 12/08/2021   • Influenza Vaccine (1) Never done   • Depression Screening  05/27/2023   • BMI: Followup Plan  05/27/2023   • BMI: Adult  05/27/2023       Please call us to make an appointment at your earliest convenience  I look forward to seeing you soon  Sincerely,         Gabe Peñaloza

## 2023-03-13 ENCOUNTER — TELEPHONE (OUTPATIENT)
Dept: ADMINISTRATIVE | Facility: OTHER | Age: 68
End: 2023-03-13

## 2023-03-13 NOTE — TELEPHONE ENCOUNTER
03/13/23 11:26 AM    The patient was called and a message was left to call the ordering provider's office regarding an open order  Thank you    Linwood Delaney  [facility]

## 2023-04-06 ENCOUNTER — VBI (OUTPATIENT)
Dept: ADMINISTRATIVE | Facility: OTHER | Age: 68
End: 2023-04-06

## 2023-04-26 ENCOUNTER — TELEPHONE (OUTPATIENT)
Dept: FAMILY MEDICINE CLINIC | Facility: CLINIC | Age: 68
End: 2023-04-26

## 2023-06-16 ENCOUNTER — TELEPHONE (OUTPATIENT)
Dept: FAMILY MEDICINE CLINIC | Facility: CLINIC | Age: 68
End: 2023-06-16

## 2023-08-10 ENCOUNTER — TELEPHONE (OUTPATIENT)
Dept: FAMILY MEDICINE CLINIC | Facility: CLINIC | Age: 68
End: 2023-08-10

## 2023-08-11 ENCOUNTER — TELEPHONE (OUTPATIENT)
Dept: FAMILY MEDICINE CLINIC | Facility: CLINIC | Age: 68
End: 2023-08-11

## 2023-08-11 NOTE — TELEPHONE ENCOUNTER
Called and left message for patient to advise they are due for colorectal screening. Asked patient to return call to the office.

## 2024-02-06 ENCOUNTER — VBI (OUTPATIENT)
Dept: ADMINISTRATIVE | Facility: OTHER | Age: 69
End: 2024-02-06

## 2024-05-04 ENCOUNTER — APPOINTMENT (EMERGENCY)
Dept: RADIOLOGY | Facility: HOSPITAL | Age: 69
DRG: 065 | End: 2024-05-04
Payer: COMMERCIAL

## 2024-05-04 ENCOUNTER — APPOINTMENT (OUTPATIENT)
Dept: CT IMAGING | Facility: HOSPITAL | Age: 69
DRG: 065 | End: 2024-05-04
Payer: COMMERCIAL

## 2024-05-04 ENCOUNTER — HOSPITAL ENCOUNTER (INPATIENT)
Facility: HOSPITAL | Age: 69
LOS: 2 days | Discharge: HOME | DRG: 065 | End: 2024-05-07
Attending: EMERGENCY MEDICINE | Admitting: INTERNAL MEDICINE
Payer: COMMERCIAL

## 2024-05-04 ENCOUNTER — APPOINTMENT (EMERGENCY)
Dept: CT IMAGING | Facility: HOSPITAL | Age: 69
DRG: 065 | End: 2024-05-04
Payer: COMMERCIAL

## 2024-05-04 DIAGNOSIS — J32.9 RECURRENT SINUSITIS: ICD-10-CM

## 2024-05-04 DIAGNOSIS — D32.9 MENINGIOMA (HCC): ICD-10-CM

## 2024-05-04 DIAGNOSIS — I63.81 LACUNAR INFARCTION (HCC): Primary | ICD-10-CM

## 2024-05-04 DIAGNOSIS — I10 PRIMARY HYPERTENSION: ICD-10-CM

## 2024-05-04 DIAGNOSIS — I63.9 STROKE (CEREBRUM) (HCC): ICD-10-CM

## 2024-05-04 DIAGNOSIS — I65.29 CAROTID ARTERY STENOSIS: ICD-10-CM

## 2024-05-04 DIAGNOSIS — I65.22 STENOSIS OF LEFT CAROTID ARTERY: ICD-10-CM

## 2024-05-04 DIAGNOSIS — S40.012A CONTUSION OF LEFT SHOULDER, INITIAL ENCOUNTER: ICD-10-CM

## 2024-05-04 LAB
ALBUMIN SERPL BCP-MCNC: 4.4 G/DL (ref 3.5–5)
ALP SERPL-CCNC: 72 U/L (ref 34–104)
ALT SERPL W P-5'-P-CCNC: 12 U/L (ref 7–52)
ANION GAP SERPL CALCULATED.3IONS-SCNC: 6 MMOL/L (ref 4–13)
AST SERPL W P-5'-P-CCNC: 15 U/L (ref 13–39)
BACTERIA UR QL AUTO: ABNORMAL /HPF
BASOPHILS # BLD AUTO: 0.04 THOUSANDS/ÂΜL (ref 0–0.1)
BASOPHILS NFR BLD AUTO: 1 % (ref 0–1)
BILIRUB SERPL-MCNC: 0.74 MG/DL (ref 0.2–1)
BILIRUB UR QL STRIP: NEGATIVE
BUN SERPL-MCNC: 17 MG/DL (ref 5–25)
CALCIUM SERPL-MCNC: 10.2 MG/DL (ref 8.4–10.2)
CHLORIDE SERPL-SCNC: 105 MMOL/L (ref 96–108)
CLARITY UR: CLEAR
CO2 SERPL-SCNC: 29 MMOL/L (ref 21–32)
COLOR UR: YELLOW
CREAT SERPL-MCNC: 0.61 MG/DL (ref 0.6–1.3)
EOSINOPHIL # BLD AUTO: 0.31 THOUSAND/ÂΜL (ref 0–0.61)
EOSINOPHIL NFR BLD AUTO: 4 % (ref 0–6)
ERYTHROCYTE [DISTWIDTH] IN BLOOD BY AUTOMATED COUNT: 13 % (ref 11.6–15.1)
GFR SERPL CREATININE-BSD FRML MDRD: 92 ML/MIN/1.73SQ M
GLUCOSE SERPL-MCNC: 113 MG/DL (ref 65–140)
GLUCOSE UR STRIP-MCNC: NEGATIVE MG/DL
HCT VFR BLD AUTO: 43.5 % (ref 34.8–46.1)
HGB BLD-MCNC: 14.3 G/DL (ref 11.5–15.4)
HGB UR QL STRIP.AUTO: ABNORMAL
IMM GRANULOCYTES # BLD AUTO: 0.03 THOUSAND/UL (ref 0–0.2)
IMM GRANULOCYTES NFR BLD AUTO: 0 % (ref 0–2)
KETONES UR STRIP-MCNC: ABNORMAL MG/DL
LEUKOCYTE ESTERASE UR QL STRIP: ABNORMAL
LYMPHOCYTES # BLD AUTO: 1.13 THOUSANDS/ÂΜL (ref 0.6–4.47)
LYMPHOCYTES NFR BLD AUTO: 16 % (ref 14–44)
MCH RBC QN AUTO: 30 PG (ref 26.8–34.3)
MCHC RBC AUTO-ENTMCNC: 32.9 G/DL (ref 31.4–37.4)
MCV RBC AUTO: 91 FL (ref 82–98)
MONOCYTES # BLD AUTO: 0.76 THOUSAND/ÂΜL (ref 0.17–1.22)
MONOCYTES NFR BLD AUTO: 11 % (ref 4–12)
MUCOUS THREADS UR QL AUTO: ABNORMAL
NEUTROPHILS # BLD AUTO: 4.95 THOUSANDS/ÂΜL (ref 1.85–7.62)
NEUTS SEG NFR BLD AUTO: 68 % (ref 43–75)
NITRITE UR QL STRIP: NEGATIVE
NON-SQ EPI CELLS URNS QL MICRO: ABNORMAL /HPF
NRBC BLD AUTO-RTO: 0 /100 WBCS
PH UR STRIP.AUTO: 5.5 [PH] (ref 4.5–8)
PLATELET # BLD AUTO: 266 THOUSANDS/UL (ref 149–390)
PMV BLD AUTO: 10.1 FL (ref 8.9–12.7)
POTASSIUM SERPL-SCNC: 3.9 MMOL/L (ref 3.5–5.3)
PROT SERPL-MCNC: 7.7 G/DL (ref 6.4–8.4)
PROT UR STRIP-MCNC: ABNORMAL MG/DL
RBC # BLD AUTO: 4.76 MILLION/UL (ref 3.81–5.12)
RBC #/AREA URNS AUTO: ABNORMAL /HPF
SODIUM SERPL-SCNC: 140 MMOL/L (ref 135–147)
SP GR UR STRIP.AUTO: 1.02 (ref 1–1.03)
UROBILINOGEN UR QL STRIP.AUTO: 0.2 E.U./DL
WBC # BLD AUTO: 7.22 THOUSAND/UL (ref 4.31–10.16)
WBC #/AREA URNS AUTO: ABNORMAL /HPF

## 2024-05-04 PROCEDURE — 80053 COMPREHEN METABOLIC PANEL: CPT | Performed by: EMERGENCY MEDICINE

## 2024-05-04 PROCEDURE — 73030 X-RAY EXAM OF SHOULDER: CPT

## 2024-05-04 PROCEDURE — 87086 URINE CULTURE/COLONY COUNT: CPT

## 2024-05-04 PROCEDURE — 70496 CT ANGIOGRAPHY HEAD: CPT

## 2024-05-04 PROCEDURE — 85025 COMPLETE CBC W/AUTO DIFF WBC: CPT | Performed by: EMERGENCY MEDICINE

## 2024-05-04 PROCEDURE — 93005 ELECTROCARDIOGRAM TRACING: CPT

## 2024-05-04 PROCEDURE — 99223 1ST HOSP IP/OBS HIGH 75: CPT | Performed by: PHYSICIAN ASSISTANT

## 2024-05-04 PROCEDURE — 99284 EMERGENCY DEPT VISIT MOD MDM: CPT

## 2024-05-04 PROCEDURE — 81001 URINALYSIS AUTO W/SCOPE: CPT

## 2024-05-04 PROCEDURE — 36415 COLL VENOUS BLD VENIPUNCTURE: CPT | Performed by: EMERGENCY MEDICINE

## 2024-05-04 PROCEDURE — 70498 CT ANGIOGRAPHY NECK: CPT

## 2024-05-04 PROCEDURE — 70450 CT HEAD/BRAIN W/O DYE: CPT

## 2024-05-04 RX ORDER — ATORVASTATIN CALCIUM 40 MG/1
40 TABLET, FILM COATED ORAL EVERY EVENING
Status: DISCONTINUED | OUTPATIENT
Start: 2024-05-04 | End: 2024-05-07 | Stop reason: HOSPADM

## 2024-05-04 RX ORDER — ASPIRIN 325 MG
325 TABLET ORAL ONCE
Status: COMPLETED | OUTPATIENT
Start: 2024-05-04 | End: 2024-05-04

## 2024-05-04 RX ORDER — HEPARIN SODIUM 5000 [USP'U]/ML
5000 INJECTION, SOLUTION INTRAVENOUS; SUBCUTANEOUS EVERY 8 HOURS SCHEDULED
Status: DISCONTINUED | OUTPATIENT
Start: 2024-05-04 | End: 2024-05-07 | Stop reason: HOSPADM

## 2024-05-04 RX ORDER — CLOPIDOGREL BISULFATE 75 MG/1
75 TABLET ORAL DAILY
Status: DISCONTINUED | OUTPATIENT
Start: 2024-05-05 | End: 2024-05-07 | Stop reason: HOSPADM

## 2024-05-04 RX ORDER — HYDRALAZINE HYDROCHLORIDE 20 MG/ML
10 INJECTION INTRAMUSCULAR; INTRAVENOUS ONCE
Status: COMPLETED | OUTPATIENT
Start: 2024-05-04 | End: 2024-05-04

## 2024-05-04 RX ORDER — LIDOCAINE 50 MG/G
1 PATCH TOPICAL DAILY
Status: DISCONTINUED | OUTPATIENT
Start: 2024-05-04 | End: 2024-05-07 | Stop reason: HOSPADM

## 2024-05-04 RX ORDER — ACETAMINOPHEN 325 MG/1
650 TABLET ORAL EVERY 6 HOURS PRN
Status: DISCONTINUED | OUTPATIENT
Start: 2024-05-04 | End: 2024-05-07 | Stop reason: HOSPADM

## 2024-05-04 RX ORDER — CLOPIDOGREL BISULFATE 75 MG/1
300 TABLET ORAL ONCE
Status: COMPLETED | OUTPATIENT
Start: 2024-05-04 | End: 2024-05-04

## 2024-05-04 RX ORDER — FLUTICASONE PROPIONATE 50 MCG
2 SPRAY, SUSPENSION (ML) NASAL DAILY
Status: DISCONTINUED | OUTPATIENT
Start: 2024-05-05 | End: 2024-05-07 | Stop reason: HOSPADM

## 2024-05-04 RX ORDER — ASPIRIN 81 MG/1
81 TABLET, CHEWABLE ORAL DAILY
Status: DISCONTINUED | OUTPATIENT
Start: 2024-05-05 | End: 2024-05-07 | Stop reason: HOSPADM

## 2024-05-04 RX ADMIN — LIDOCAINE 5% 1 PATCH: 700 PATCH TOPICAL at 21:38

## 2024-05-04 RX ADMIN — HEPARIN SODIUM 5000 UNITS: 5000 INJECTION INTRAVENOUS; SUBCUTANEOUS at 21:34

## 2024-05-04 RX ADMIN — Medication 2.5 MG: at 21:34

## 2024-05-04 RX ADMIN — IOHEXOL 85 ML: 350 INJECTION, SOLUTION INTRAVENOUS at 23:39

## 2024-05-04 RX ADMIN — ASPIRIN 325 MG ORAL TABLET 325 MG: 325 PILL ORAL at 19:03

## 2024-05-04 RX ADMIN — ATORVASTATIN CALCIUM 40 MG: 40 TABLET, FILM COATED ORAL at 21:34

## 2024-05-04 RX ADMIN — HYDRALAZINE HYDROCHLORIDE 10 MG: 20 INJECTION, SOLUTION INTRAMUSCULAR; INTRAVENOUS at 19:04

## 2024-05-04 RX ADMIN — CLOPIDOGREL BISULFATE 300 MG: 75 TABLET ORAL at 19:03

## 2024-05-04 NOTE — ED PROVIDER NOTES
History  Chief Complaint   Patient presents with    Shoulder Pain     Patient reports fall yesterday onto left shoulder. States she felt unsteady and fell and hit shoulder on table. Also reports recently seen at dentist for ulcer on right upper tooth, unsure if it's related to her feeling unsteady.      68-year-old female with history of hypertension, remote history of melanoma presents to the ED complaining of left shoulder pain after a fall yesterday.  Patient states she had just come back from her dentist office for aphthous ulcer on the right side.  She states she went to the bathroom and then when she was coming back from the bathroom she felt off balance and fell onto her left side.  She denies striking her head.  Today she was having a FaceTime conversation with her sister and her sister felt that the left side of her face was drooping.  Her son who is in the room also feels that the left side of her mouth is drooping.  The patient has not noticed this.  She states she still feels a little off balance when she walks.  No focal weakness or numbness, visual deficits or slurred speech.  Patient states she had a similar episode about 10 years ago and was told it was an equilibrium problem in her ears.      History provided by:  Patient and relative   used: No    Shoulder Pain  Location:  Shoulder  Shoulder location:  L shoulder  Injury: yes    Time since incident:  1 day  Mechanism of injury: fall    Fall:     Fall occurred:  Standing    Impact surface:  Hard floor    Point of impact: Left side.  Pain details:     Radiates to:  Does not radiate    Severity:  Moderate    Onset quality:  Gradual    Duration:  1 day    Timing:  Constant    Progression:  Unchanged  Prior injury to area:  No  Relieved by:  None tried  Worsened by:  Movement  Ineffective treatments:  None tried  Associated symptoms: decreased range of motion    Associated symptoms: no back pain, no fatigue, no fever, no muscle  weakness, no numbness, no stiffness, no swelling and no tingling    Risk factors: no known bone disorder and no recent illness    Dizziness  Quality:  Imbalance  Onset quality:  Gradual  Duration:  1 day  Timing:  Intermittent  Chronicity:  New  Context: standing up    Relieved by:  None tried  Worsened by:  Nothing  Ineffective treatments:  None tried  Associated symptoms: no blood in stool, no chest pain, no diarrhea, no headaches, no hearing loss, no nausea, no palpitations, no shortness of breath, no syncope, no tinnitus, no vision changes, no vomiting and no weakness    Risk factors: no anemia, no heart disease, no hx of stroke, no hx of vertigo, no Meniere's disease, no multiple medications and no new medications        Prior to Admission Medications   Prescriptions Last Dose Informant Patient Reported? Taking?   Calcium Carb-Cholecalciferol (Caltrate 600+D3) 600-800 MG-UNIT TABS  Self Yes No   Sig: Take 1 tablet by mouth in the morning   Ibuprofen (Advil) 200 MG CAPS  Self Yes No   Sig: Take 2 capsules by mouth 3 (three) times a day as needed   Multiple Vitamins-Minerals (Centrum Silver 50+Women) TABS  Self Yes No   Sig: Take 1 tablet by mouth daily   fluticasone (FLONASE) 50 mcg/act nasal spray  Self Yes No   Si sprays into each nostril daily   loratadine (CLARITIN) 10 mg tablet  Self Yes No   Sig: Take 10 mg by mouth daily   olopatadine (PATANOL) 0.1 % ophthalmic solution   No No   Sig: Administer 1 drop to both eyes in the morning      Facility-Administered Medications: None       Past Medical History:   Diagnosis Date    History of ovarian cyst     Melanoma of shoulder, right (HCC)        Past Surgical History:   Procedure Laterality Date    OVARIAN CYST REMOVAL Left        Family History   Problem Relation Age of Onset    No Known Problems Mother     Diabetes Father     Heart murmur Father     No Known Problems Son     No Known Problems Son      I have reviewed and agree with the history as  documented.    E-Cigarette/Vaping    E-Cigarette Use Never User      E-Cigarette/Vaping Substances    Nicotine No     THC No     CBD No     Flavoring No     Other No     Unknown No      Social History     Tobacco Use    Smoking status: Former     Current packs/day: 0.00     Average packs/day: 0.5 packs/day for 31.7 years (15.8 ttl pk-yrs)     Types: Cigarettes     Start date:      Quit date: 2016     Years since quittin.6    Smokeless tobacco: Never   Vaping Use    Vaping status: Never Used   Substance Use Topics    Alcohol use: Yes     Alcohol/week: 2.0 standard drinks of alcohol     Types: 1 Cans of beer, 1 Glasses of wine per week    Drug use: Not Currently       Review of Systems   Constitutional: Negative.  Negative for chills, diaphoresis, fatigue and fever.   HENT: Negative.  Negative for congestion, hearing loss, rhinorrhea, sore throat and tinnitus.    Eyes: Negative.  Negative for discharge, redness and itching.   Respiratory: Negative.  Negative for apnea, cough, chest tightness, shortness of breath and wheezing.    Cardiovascular:  Negative for chest pain, palpitations, leg swelling and syncope.   Gastrointestinal: Negative.  Negative for abdominal pain, blood in stool, diarrhea, nausea and vomiting.   Endocrine: Negative.    Genitourinary: Negative.  Negative for flank pain, frequency and urgency.   Musculoskeletal: Negative.  Negative for back pain and stiffness.   Skin: Negative.    Allergic/Immunologic: Negative.    Neurological:  Positive for dizziness. Negative for tremors, seizures, syncope, facial asymmetry, speech difficulty, weakness, light-headedness, numbness and headaches.   Hematological: Negative.    All other systems reviewed and are negative.      Physical Exam  Physical Exam  Vitals and nursing note reviewed.   Constitutional:       General: She is awake. She is not in acute distress.     Appearance: Normal appearance. She is well-developed and normal weight. She is not  ill-appearing, toxic-appearing or diaphoretic.   HENT:      Head: Normocephalic and atraumatic.      Right Ear: Tympanic membrane and external ear normal.      Left Ear: Tympanic membrane and external ear normal.      Nose: Nose normal.      Mouth/Throat:      Mouth: Mucous membranes are moist.      Pharynx: No oropharyngeal exudate or posterior oropharyngeal erythema.   Eyes:      General: Lids are normal. No visual field deficit or scleral icterus.        Right eye: No discharge.         Left eye: No discharge.      Extraocular Movements: Extraocular movements intact.      Right eye: Normal extraocular motion and no nystagmus.      Left eye: Normal extraocular motion and no nystagmus.      Conjunctiva/sclera: Conjunctivae normal.      Pupils: Pupils are equal, round, and reactive to light.   Neck:      Thyroid: No thyromegaly.      Vascular: No JVD.      Trachea: No tracheal deviation.   Cardiovascular:      Rate and Rhythm: Normal rate and regular rhythm.      Heart sounds: Murmur heard.      Systolic murmur is present with a grade of 2/6.      No friction rub. No gallop.   Pulmonary:      Effort: Pulmonary effort is normal. No respiratory distress.      Breath sounds: Normal breath sounds. No stridor. No wheezing, rhonchi or rales.   Chest:      Chest wall: No tenderness.   Abdominal:      General: Bowel sounds are normal. There is no distension.      Palpations: Abdomen is soft. There is no mass.      Tenderness: There is no abdominal tenderness.      Hernia: No hernia is present.   Musculoskeletal:         General: No deformity.      Left shoulder: Tenderness present. No swelling, deformity, effusion, laceration, bony tenderness or crepitus. Decreased range of motion. Normal strength. Normal pulse.        Arms:       Cervical back: Normal range of motion and neck supple.      Right lower leg: No edema.      Left lower leg: No edema.   Lymphadenopathy:      Cervical: No cervical adenopathy.   Skin:     General:  Skin is warm and dry.      Coloration: Skin is not jaundiced or pale.      Findings: No bruising, erythema, lesion or rash.   Neurological:      General: No focal deficit present.      Mental Status: She is alert and oriented to person, place, and time.      Cranial Nerves: No cranial nerve deficit, dysarthria or facial asymmetry.      Motor: No weakness, tremor, atrophy, abnormal muscle tone or pronator drift.      Coordination: Romberg sign negative. Coordination normal. Finger-Nose-Finger Test and Heel to Shin Test normal.      Gait: Gait is intact. Gait normal.      Deep Tendon Reflexes: Reflexes are normal and symmetric. Reflexes normal.   Psychiatric:         Mood and Affect: Mood normal.         Behavior: Behavior is cooperative.         Vital Signs  ED Triage Vitals   Temperature Pulse Respirations Blood Pressure SpO2   05/04/24 1653 05/04/24 1651 05/04/24 1651 05/04/24 1651 05/04/24 1651   98.2 °F (36.8 °C) 81 18 (!) 195/110 96 %      Temp Source Heart Rate Source Patient Position - Orthostatic VS BP Location FiO2 (%)   05/04/24 1653 05/04/24 1651 05/04/24 1651 05/04/24 1651 --   Oral Monitor Sitting Left arm       Pain Score       05/04/24 1651       7           Vitals:    05/04/24 1651   BP: (!) 195/110   Pulse: 81   Patient Position - Orthostatic VS: Sitting         Visual Acuity      ED Medications  Medications - No data to display    Diagnostic Studies  Results Reviewed       Procedure Component Value Units Date/Time    CBC and differential [[id number]]     Lab Status: No result Specimen: Blood     Comprehensive metabolic panel [[id number]]     Lab Status: No result Specimen: Blood                    CT head without contrast    (Results Pending)   XR shoulder 2+ views LEFT    (Results Pending)              Procedures  ECG 12 Lead Documentation Only    Date/Time: 5/4/2024 7:01 PM    Performed by: Ashley Jason DO  Authorized by: Ashley Jason DO    Indications / Diagnosis:  Cva  ECG  reviewed by me, the ED Provider: yes    Patient location:  ED  Interpretation:     Interpretation: non-specific    Rate:     ECG rate:  57  Rhythm:     Rhythm: sinus bradycardia    Ectopy:     Ectopy: none    Conduction:     Conduction: normal    ST segments:     ST segments:  Non-specific  T waves:     T waves: inverted      Inverted:  III           ED Course  ED Course as of 05/04/24 1837   Sat May 04, 2024   1738 WBC: 7.22   1738 Hemoglobin: 14.3   1739 X-ray left shoulder: No fracture/dislocation   1752 Normal CMP   1831 CT head:1. Age-indeterminate right thalamic lacunar infarction. Given the history of recent falling, this may represent a recent or subacute infarction. Further clinical assessment advised. 2. Large 2.5 cm extra-axial densely calcified mass with associated hyperostosis lateral to the left frontal lobe most likely a large meningioma with mild local mass effect. No subfalcine herniation or significant surrounding edema. Follow-up contrast-enhanced MRI of the brain and nonemergent neurosurgical assessment suggested. 3. Mild microangiopathy. 4. Extensive sinus disease with hyperdense elements possibly on the basis of fungal colonization or inspissated secretions. An obstructing mass including sinonasal polyposis or other etiologies not excluded. Follow-up nonemergent ENT assessment recommended. 5. Left frontal scalp abnormality with peripheral calcifications possibly sebaceous cyst. Direct clinical assessment advised. 7. No acute intracranial hemorrhage. Workstation perfor                               Medical Referral SourceIRT 22yo+      Flowsheet Row Most Recent Value   Initial Alcohol Screen: US AUDIT-C     1. How often do you have a drink containing alcohol? 0 Filed at: 05/04/2024 1652   2. How many drinks containing alcohol do you have on a typical day you are drinking?  0 Filed at: 05/04/2024 1652   3b. FEMALE Any Age, or MALE 65+: How often do you have 4 or more drinks on one occassion? 0 Filed at: 05/04/2024  1652   Audit-C Score 0 Filed at: 05/04/2024 1652   LETA: How many times in the past year have you...    Used an illegal drug or used a prescription medication for non-medical reasons? Never Filed at: 05/04/2024 1652                      Medical Decision Making  68-year-old female presents to the ED complaining of left shoulder pain after a fall at home yesterday.  Patient states she had just returned from the dentist after an examination.  She states no medications were given to her.  She went to the bathroom and then after leaving the bathroom she felt off balance and fell onto her left side.  She did not strike her head and there was no loss of consciousness.  She was able to get up but felt off balance.  This sensation seems to come and go.  She was having a FaceTime conversation with her sister today and her sister felt that the left side of her face especially her mouth was drooping.  Her son feels that the left side of her mouth is drooping as well.  The patient has not noticed this or any speech deficits, visual deficits, weakness or numbness.  She states she had off-balance feeling about 10 years ago and was told by her doctor that it was equilibrium issue with her ears.  On exam she is alert she is in no acute distress.  She has normal speech.  Her face is symmetrical.  No focal deficits and cerebellar signs are intact.  Although clinical suspicion is low given normal neuroexam we will do CT of head to rule out CVA.  Will also x-ray left shoulder to rule out fracture.    Amount and/or Complexity of Data Reviewed  Labs: ordered. Decision-making details documented in ED Course.  Radiology: ordered and independent interpretation performed.    Risk  OTC drugs.  Prescription drug management.  Decision regarding hospitalization.             Disposition  Final diagnoses:   None     ED Disposition       None          Follow-up Information    None         Patient's Medications   Discharge Prescriptions    No  medications on file       No discharge procedures on file.    PDMP Review       None            ED Provider  Electronically Signed by             Ashley Jason DO  05/04/24 [id number]

## 2024-05-05 ENCOUNTER — APPOINTMENT (INPATIENT)
Dept: NON INVASIVE DIAGNOSTICS | Facility: HOSPITAL | Age: 69
DRG: 065 | End: 2024-05-05
Payer: COMMERCIAL

## 2024-05-05 PROBLEM — I77.819 AORTIC ECTASIA (HCC): Status: ACTIVE | Noted: 2024-05-05

## 2024-05-05 LAB
AORTIC ROOT: 3.1 CM
APICAL FOUR CHAMBER EJECTION FRACTION: 63 %
AV PEAK GRADIENT: 12 MMHG
AV REGURGITATION PRESSURE HALF TIME: 800 MS
BSA FOR ECHO PROCEDURE: 1.9 M2
CHOLEST SERPL-MCNC: 222 MG/DL
E WAVE DECELERATION TIME: 243 MS
E/A RATIO: 0.75
EST. AVERAGE GLUCOSE BLD GHB EST-MCNC: 117 MG/DL
FRACTIONAL SHORTENING: 48 (ref 28–44)
HBA1C MFR BLD: 5.7 %
HDLC SERPL-MCNC: 49 MG/DL
INTERVENTRICULAR SEPTUM IN DIASTOLE (PARASTERNAL SHORT AXIS VIEW): 0.9 CM
INTERVENTRICULAR SEPTUM: 0.9 CM (ref 0.6–1.1)
LAAS-AP2: 16.2 CM2
LAAS-AP4: 13.1 CM2
LDLC SERPL CALC-MCNC: 129 MG/DL (ref 0–100)
LEFT ATRIUM SIZE: 2.2 CM
LEFT ATRIUM VOLUME (MOD BIPLANE): 34 ML
LEFT ATRIUM VOLUME INDEX (MOD BIPLANE): 17.8 ML/M2
LEFT INTERNAL DIMENSION IN SYSTOLE: 2.5 CM (ref 2.1–4)
LEFT VENTRICULAR INTERNAL DIMENSION IN DIASTOLE: 4.8 CM (ref 3.5–6)
LEFT VENTRICULAR POSTERIOR WALL IN END DIASTOLE: 0.8 CM
LEFT VENTRICULAR STROKE VOLUME: 84 ML
LVSV (TEICH): 84 ML
MV E'TISSUE VEL-LAT: 8 CM/S
MV E'TISSUE VEL-SEP: 7 CM/S
MV PEAK A VEL: 0.79 M/S
MV PEAK E VEL: 59 CM/S
MV STENOSIS PRESSURE HALF TIME: 70 MS
MV VALVE AREA P 1/2 METHOD: 3.1
RIGHT ATRIAL 2D VOLUME: 31 ML
RIGHT ATRIUM AREA SYSTOLE A4C: 13.2 CM2
RIGHT VENTRICLE ID DIMENSION: 2.8 CM
SL CV AV DECELERATION TIME RETROGRADE: 2760 MS
SL CV AV PEAK GRADIENT RETROGRADE: 35 MMHG
SL CV LEFT ATRIUM LENGTH A2C: 4.9 CM
SL CV LV EF: 60
SL CV PED ECHO LEFT VENTRICLE DIASTOLIC VOLUME (MOD BIPLANE) 2D: 108 ML
SL CV PED ECHO LEFT VENTRICLE SYSTOLIC VOLUME (MOD BIPLANE) 2D: 23 ML
TRICUSPID ANNULAR PLANE SYSTOLIC EXCURSION: 2.1 CM
TRIGL SERPL-MCNC: 221 MG/DL

## 2024-05-05 PROCEDURE — 92610 EVALUATE SWALLOWING FUNCTION: CPT

## 2024-05-05 PROCEDURE — 93306 TTE W/DOPPLER COMPLETE: CPT | Performed by: INTERNAL MEDICINE

## 2024-05-05 PROCEDURE — 93306 TTE W/DOPPLER COMPLETE: CPT

## 2024-05-05 PROCEDURE — 99232 SBSQ HOSP IP/OBS MODERATE 35: CPT | Performed by: INTERNAL MEDICINE

## 2024-05-05 PROCEDURE — 83036 HEMOGLOBIN GLYCOSYLATED A1C: CPT | Performed by: PHYSICIAN ASSISTANT

## 2024-05-05 PROCEDURE — 99223 1ST HOSP IP/OBS HIGH 75: CPT | Performed by: PSYCHIATRY & NEUROLOGY

## 2024-05-05 PROCEDURE — 80061 LIPID PANEL: CPT | Performed by: PHYSICIAN ASSISTANT

## 2024-05-05 RX ORDER — HYDRALAZINE HYDROCHLORIDE 20 MG/ML
10 INJECTION INTRAMUSCULAR; INTRAVENOUS EVERY 4 HOURS PRN
Status: DISCONTINUED | OUTPATIENT
Start: 2024-05-05 | End: 2024-05-07 | Stop reason: HOSPADM

## 2024-05-05 RX ADMIN — HEPARIN SODIUM 5000 UNITS: 5000 INJECTION INTRAVENOUS; SUBCUTANEOUS at 21:16

## 2024-05-05 RX ADMIN — CLOPIDOGREL BISULFATE 75 MG: 75 TABLET ORAL at 08:42

## 2024-05-05 RX ADMIN — ASPIRIN 81 MG CHEWABLE TABLET 81 MG: 81 TABLET CHEWABLE at 08:42

## 2024-05-05 RX ADMIN — Medication 2.5 MG: at 05:45

## 2024-05-05 RX ADMIN — LIDOCAINE 5% 1 PATCH: 700 PATCH TOPICAL at 08:43

## 2024-05-05 RX ADMIN — HEPARIN SODIUM 5000 UNITS: 5000 INJECTION INTRAVENOUS; SUBCUTANEOUS at 15:19

## 2024-05-05 RX ADMIN — HEPARIN SODIUM 5000 UNITS: 5000 INJECTION INTRAVENOUS; SUBCUTANEOUS at 05:39

## 2024-05-05 RX ADMIN — ATORVASTATIN CALCIUM 40 MG: 40 TABLET, FILM COATED ORAL at 17:28

## 2024-05-05 RX ADMIN — Medication 2.5 MG: at 20:18

## 2024-05-05 RX ADMIN — FLUTICASONE PROPIONATE 2 SPRAY: 50 SPRAY, METERED NASAL at 08:42

## 2024-05-05 NOTE — ASSESSMENT & PLAN NOTE
Not on medications prior to admission.    Much elevated upon arrival.  Currently allowing permissive hypertension due to stroke

## 2024-05-05 NOTE — PROGRESS NOTES
Counts include 234 beds at the [facility]  Progress Note  Name: Sanam Castro I  MRN: [id number]  Unit/Bed#: E4 -01 I Date of Admission: 5/4/2024   Date of Service: 5/5/2024 I Hospital Day: 0    Assessment/Plan   * Stroke (cerebrum) (HCC)  Assessment & Plan  History of hypertension and meningioma presented to the hospital for left-sided weakness  Had a fall hitting left shoulder..  Suspicion for right thalamic infarct.  Stroke pathway with DAPT and atorvastatin.  Awaiting MRI and echo and PT/OT/SLP evaluations.    Aortic ectasia (HCC)  Assessment & Plan  Fusiform ectasia of the ascending thoracic aorta measuring up to 40 mm.   Recommendation is for follow-up low radiation dose chest CT in one year.     Meningioma (HCC)  Assessment & Plan  Previously followed with [facility] neurosurgery  Follow-up on MRI    Primary hypertension  Assessment & Plan  Not on medications prior to admission.    Much elevated upon arrival.  Currently allowing permissive hypertension due to stroke    VTE Pharmacologic Prophylaxis: VTE Score: 8 High Risk (Score >/= 5) - Pharmacological DVT Prophylaxis Ordered: heparin. Sequential Compression Devices Ordered.    Mobility:     [facility] Goal achieved. Continue to encourage appropriate mobility.    Patient Centered Rounds: I have performed bedside rounds with nursing staff today.  Discussions with Specialists or Other Care Team Provider: Case management    Education and Discussions with Family / Patient:     Time Spent for Care:   This time was spent on one or more of the following: performing physical exam; counseling and coordination of care; obtaining or reviewing history; documenting in the medical record; reviewing/ordering tests, medications or procedures; communicating with other healthcare professionals and discussing with patient's family/caregivers.    Current Length of Stay: 0 day(s)  Current Patient Status: Inpatient   Certification Statement: The patient will continue to require  "additional inpatient hospital stay due to stroke workup  Discharge Plan: Anticipate discharge in 24-48 hrs to discharge location to be determined pending rehab evaluations.    Code Status: Level 1 - Full Code      Subjective:   Patient seen and examined.  Having some left shoulder pains.    Objective:   Vitals: Blood pressure 155/90, pulse 81, temperature 98.2 °F (36.8 °C), temperature source Temporal, resp. rate 18, height 5' 9\" (1.753 m), weight 75.2 kg (165 lb 12.6 oz), SpO2 98%, not currently breastfeeding.  No intake or output data in the 24 hours ending 05/05/24 1255    Physical Exam  Vitals reviewed.   Constitutional:       General: She is not in acute distress.     Appearance: Normal appearance.   HENT:      Head: Atraumatic.   Eyes:      General: No scleral icterus.  Cardiovascular:      Rate and Rhythm: Regular rhythm.   Pulmonary:      Breath sounds: No stridor. No wheezing.   Abdominal:      General: Bowel sounds are normal.      Palpations: Abdomen is soft.      Tenderness: There is no guarding or rebound.   Musculoskeletal:         General: No deformity.   Skin:     General: Skin is warm.   Neurological:      Mental Status: She is alert and oriented to person, place, and time. Mental status is at baseline.      Motor: Weakness (Left extremity) present.   Psychiatric:         Mood and Affect: Mood normal.       Additional Data:   Labs:  Results from last 7 days   Lab Units 05/04/24  1718   WBC Thousand/uL 7.22   HEMOGLOBIN g/dL 14.3   PLATELETS Thousands/uL 266   MCV fL 91     Results from last 7 days   Lab Units 05/04/24  1718   SODIUM mmol/L 140   POTASSIUM mmol/L 3.9   CHLORIDE mmol/L 105   CO2 mmol/L 29   ANION GAP mmol/L 6   BUN mg/dL 17   CREATININE mg/dL 0.61   CALCIUM mg/dL 10.2   ALBUMIN g/dL 4.4   TOTAL BILIRUBIN mg/dL 0.74   ALK PHOS U/L 72   ALT U/L 12   AST U/L 15   EGFR ml/min/1.73sq m 92   GLUCOSE RANDOM mg/dL 113                              Results from last 7 days   Lab Units " 05/05/24  0507   HEMOGLOBIN A1C % 5.7*         * I Have Reviewed All Lab Data Listed Above.    Recent cultures:                   Lines/Drains:  Invasive Devices       Peripheral Intravenous Line  Duration             Peripheral IV 05/04/24 Left Antecubital <1 day                      Telemetry:  Telemetry Orders (From admission, onward)               24 Hour Telemetry Monitoring  Continuous x 24 Hours (Telem)        Question:  Reason for 24 Hour Telemetry  Answer:  TIA/Suspected CVA/ Confirmed CVA                     Telemetry Reviewed: Normal Sinus Rhythm  Indication for Continued Telemetry Use: Acute CVA             Telemetry:   Telemetry Orders (From admission, onward)               24 Hour Telemetry Monitoring  Continuous x 24 Hours (Telem)        Question:  Reason for 24 Hour Telemetry  Answer:  TIA/Suspected CVA/ Confirmed CVA                      Imaging:  Imaging Reports Reviewed Today Include:   CTA head and neck w wo contrast    Result Date: 5/5/2024  Impression: Redemonstration of lacunar infarct in the right thalamus that is favored to be acute or subacute. No acute hemorrhage. Chronic microangiopathy. Stable left posterior frontal/parietal calcified meningioma measuring 2.5 cm with localized mass effect. No edema. Severe calcified plaque at the left carotid bifurcation. Degree of stenosis is difficult to measure but left ICA is diffusely smaller than the right likely due to flow-limiting critical stenosis. Recommend vascular consult. No significant stenosis of the cervical right carotid or vertebral arteries No high-grade intracranial stenosis, large vessel occlusion or aneurysm Ectatic ascending thoracic aorta. Recommendation is for follow-up low radiation dose chest CT in one year. Preliminary report provided by [employer]. The study was marked in EPIC for immediate notification. Workstation performed: [id number]     CT head without contrast    Result Date: 5/4/2024  Impression: 1.  Age-indeterminate right thalamic lacunar infarction. Given the history of recent falling, this may represent a recent or subacute infarction. Further clinical assessment advised. 2. Large 2.5 cm extra-axial densely calcified mass with associated hyperostosis lateral to the left frontal lobe most likely a large meningioma with mild local mass effect. No subfalcine herniation or significant surrounding edema. Follow-up contrast-enhanced MRI of the brain and nonemergent neurosurgical assessment suggested. 3. Mild microangiopathy. 4. Extensive sinus disease with hyperdense elements possibly on the basis of fungal colonization or inspissated secretions. An obstructing mass including sinonasal polyposis or other etiologies not excluded. Follow-up nonemergent ENT assessment recommended. 5. Left frontal scalp abnormality with peripheral calcifications possibly sebaceous cyst. Direct clinical assessment advised. 7. No acute intracranial hemorrhage. Workstation performed: [id number]       Scheduled Meds:  Current Facility-Administered Medications   Medication Dose Route Frequency Provider Last Rate    acetaminophen  650 mg Oral Q6H PRN Livia Javier PA-C      aspirin  81 mg Oral Daily Livia Javier PA-C      atorvastatin  40 mg Oral QPM Livia Javier PA-C      clopidogrel  75 mg Oral Daily Livia Javier PA-C      fluticasone  2 spray Nasal Daily Livia Javier PA-C      heparin (porcine)  5,000 Units Subcutaneous Q8H SALLIE Livia Javier PA-C      lidocaine  1 patch Topical Daily Livia Javier PA-C      oxyCODONE  2.5 mg Oral Q6H PRN Livia Javier PA-C         Today, Patient Was Seen By: Clay Villalba DO    ** Please Note: Dictation voice to text software may have been used in the creation of this document. **

## 2024-05-05 NOTE — ASSESSMENT & PLAN NOTE
Age indeterminant right thalamic infarct in pt who has been taking nsaids for her periodontal disease at her dentist recommendation w/>24h gait instability and diminished fine motor control with using her cell phone.  -ct head confirming subacute vs chronic infarct and was recommended stroke pathway and dapt per ed d/w neuro  -check cta head/neck, mri w and w/o lyric brain for possible meningioma as above  -will tentatively continue maintenance asa/plavix but d/w neuro regarding remote hx of sah vs monotherapy. Per d/w neuro continue dapt for now given no evidence of active bleed on ct head.  Empiric statin  -permissive htn neurohecks x48h,  -flp hgb A1c echo  -neuro pt ot

## 2024-05-05 NOTE — PLAN OF CARE
Problem: Potential for Falls  Goal: Patient will remain free of falls  Description: INTERVENTIONS:  - Educate patient/family on patient safety including physical limitations  - Instruct patient to call for assistance with activity   - Consult OT/PT to assist with strengthening/mobility   - Keep Call bell within reach  - Keep bed low and locked with side rails adjusted as appropriate  - Keep care items and personal belongings within reach  - Initiate and maintain comfort rounds  - Make Fall Risk Sign visible to staff  - Offer Toileting every 2 Hours, in advance of need  - Initiate/Maintain bed alarm  - Obtain necessary fall risk management equipment: call bell  Problem: PAIN - ADULT  Goal: Verbalizes/displays adequate comfort level or baseline comfort level  Description: Interventions:  - Encourage patient to monitor pain and request assistance  - Assess pain using appropriate pain scale  - Administer analgesics based on type and severity of pain and evaluate response  - Implement non-pharmacological measures as appropriate and evaluate response  - Consider cultural and social influences on pain and pain management  - Notify physician/advanced practitioner if interventions unsuccessful or patient reports new pain  Outcome: Progressing     Problem: SAFETY ADULT  Goal: Patient will remain free of falls  Description: INTERVENTIONS:  - Educate patient/family on patient safety including physical limitations  - Instruct patient to call for assistance with activity   - Consult OT/PT to assist with strengthening/mobility   - Keep Call bell within reach  - Keep bed low and locked with side rails adjusted as appropriate  - Keep care items and personal belongings within reach  - Initiate and maintain comfort rounds  - Make Fall Risk Sign visible to staff  - Offer Toileting every 2 Hours, in advance of need  - Initiate/Maintain bed alarm  - Obtain necessary fall risk management equipment: call bell  Problem: DISCHARGE  PLANNING  Goal: Discharge to home or other facility with appropriate resources  Description: INTERVENTIONS:  - Identify barriers to discharge w/patient and caregiver  - Arrange for needed discharge resources and transportation as appropriate  - Identify discharge learning needs (meds, wound care, etc.)  - Arrange for interpretive services to assist at discharge as needed  - Refer to Case Management Department for coordinating discharge planning if the patient needs post-hospital services based on physician/advanced practitioner order or complex needs related to functional status, cognitive ability, or social support system  Outcome: Progressing     Problem: Knowledge Deficit  Goal: Patient/family/caregiver demonstrates understanding of disease process, treatment plan, medications, and discharge instructions  Description: Complete learning assessment and assess knowledge base.  Interventions:  - Provide teaching at level of understanding  - Provide teaching via preferred learning methods  Outcome: Progressing     Problem: CARDIOVASCULAR - ADULT  Goal: Maintains optimal cardiac output and hemodynamic stability  Description: INTERVENTIONS:  - Monitor I/O, vital signs and rhythm  - Monitor for S/S and trends of decreased cardiac output  - Administer and titrate ordered vasoactive medications to optimize hemodynamic stability  - Assess quality of pulses, skin color and temperature  - Assess for signs of decreased coronary artery perfusion  - Instruct patient to report change in severity of symptoms  Outcome: Progressing     Problem: RESPIRATORY - ADULT  Goal: Achieves optimal ventilation and oxygenation  Description: INTERVENTIONS:  - Assess for changes in respiratory status  - Assess for changes in mentation and behavior  - Position to facilitate oxygenation and minimize respiratory effort  - Oxygen administered by appropriate delivery if ordered  - Initiate smoking cessation education as indicated  - Encourage  broncho-pulmonary hygiene including cough, deep breathe, Incentive Spirometry  - Assess the need for suctioning and aspirate as needed  - Assess and instruct to report SOB or any respiratory difficulty  - Respiratory Therapy support as indicated  Outcome: Progressing     Problem: HEMATOLOGIC - ADULT  Goal: Maintains hematologic stability  Description: INTERVENTIONS  - Assess for signs and symptoms of bleeding or hemorrhage  - Monitor labs  - Administer supportive blood products/factors as ordered and appropriate  Outcome: Progressing     Problem: MUSCULOSKELETAL - ADULT  Goal: Maintain or return mobility to safest level of function  Description: INTERVENTIONS:  - Assess patient's ability to carry out ADLs; assess patient's baseline for ADL function and identify physical deficits which impact ability to perform ADLs (bathing, care of mouth/teeth, toileting, grooming, dressing, etc.)  - Assess/evaluate cause of self-care deficits   - Assess range of motion  - Assess patient's mobility  - Assess patient's need for assistive devices and provide as appropriate  - Encourage maximum independence but intervene and supervise when necessary  - Involve family in performance of ADLs  - Assess for home care needs following discharge   - Consider OT consult to assist with ADL evaluation and planning for discharge  - Provide patient education as appropriate  Outcome: Progressing     - Apply yellow socks and bracelet for high fall risk patients  - Consider moving patient to room near nurses station  Outcome: Progressing  Goal: Maintain or return to baseline ADL function  Description: INTERVENTIONS:  -  Assess patient's ability to carry out ADLs; assess patient's baseline for ADL function and identify physical deficits which impact ability to perform ADLs (bathing, care of mouth/teeth, toileting, grooming, dressing, etc.)  - Assess/evaluate cause of self-care deficits   - Assess range of motion  - Assess patient's mobility; develop  plan if impaired  - Assess patient's need for assistive devices and provide as appropriate  - Encourage maximum independence but intervene and supervise when necessary  - Involve family in performance of ADLs  - Assess for home care needs following discharge   - Consider OT consult to assist with ADL evaluation and planning for discharge  - Provide patient education as appropriate  Outcome: Progressing     - Apply yellow socks and bracelet for high fall risk patients  - Consider moving patient to room near nurses station  Outcome: Progressing

## 2024-05-05 NOTE — INCIDENTAL FINDINGS
The following findings require follow up:  Radiographic finding  Findings:   CTA head and neck w wo contrast: Stable left posterior frontal/parietal calcified meningioma measuring 2.5 cm with localized mass effect.  Ectatic ascending thoracic aorta. Recommendation is for follow-up low radiation dose chest CT in one year.   CT head without contrast: Large 2.5 cm extra-axial densely calcified mass with associated hyperostosis lateral to the left frontal lobe most likely a large meningioma with mild local mass effect. Follow-up contrast-enhanced MRI of the brain and nonemergent neurosurgical assessment suggested.Extensive sinus disease with hyperdense elements possibly on the basis of fungal colonization or inspissated secretions. An obstructing mass including sinonasal polyposis or other etiologies not excluded. Follow-up nonemergent ENT assessment recommended.      Follow up required:    Follow up should be done     Please notify the following clinician to assist with the follow up:   PCP

## 2024-05-05 NOTE — ASSESSMENT & PLAN NOTE
Fusiform ectasia of the ascending thoracic aorta measuring up to 40 mm.   Recommendation is for follow-up low radiation dose chest CT in one year.

## 2024-05-05 NOTE — ASSESSMENT & PLAN NOTE
Noted on ct head w/Extensive sinus disease with hyperdense elements possibly on the basis of fungal colonization or inspissated secretions. An obstructing mass including sinonasal polyposis or other etiologies not excluded   -Continue flonase  -Ambulatory to ent

## 2024-05-05 NOTE — PHYSICAL THERAPY NOTE
Physical Therapy Cancellation Note:    Awaiting results to be read for XRAY shoulder. Cancel PT services for today PENDING XRAY results of shoulder. Will cont to follow as able to initiate PT IE.

## 2024-05-05 NOTE — H&P
[facility]  H&P  Name: Sanam Castro 68 y.o. female I MRN: [id number]  Unit/Bed#: E4 MS [facility]-01 I Date of Admission: 5/4/2024   Date of Service: 5/4/2024 I Hospital Day: 0      Assessment/Plan   * Stroke (cerebrum) ([facility])  Assessment & Plan  Age indeterminant right thalamic infarct in pt who has been taking nsaids for her periodontal disease at her dentist recommendation w/>24h gait instability and diminished fine motor control with using her cell phone.  -ct head confirming subacute vs chronic infarct and was recommended stroke pathway and dapt per ed d/w neuro  -check cta head/neck, mri w and w/o lyric brain for possible meningioma as above  -will tentatively continue maintenance asa/plavix but d/w neuro regarding remote hx of sah vs monotherapy. Per d/w neuro continue dapt for now given no evidence of active bleed on ct head.  Empiric statin  -permissive htn neurohecks x48h,  -flp hgb A1c echo  -neuro pt ot    Recurrent sinusitis  Assessment & Plan  Noted on ct head w/Extensive sinus disease with hyperdense elements possibly on the basis of fungal colonization or inspissated secretions. An obstructing mass including sinonasal polyposis or other etiologies not excluded   -Continue flonase  -Ambulatory to ent      Meningioma ([facility])  Assessment & Plan  Possible meningioma of 2.5cm of left frontal region on ct head.  Notably pt reports prior fall requiring sutures on that side remotely.  Per [facility] neurosx had SAH conservatively treated in [place] which had resolved at time of discharge in 03/22.  Given size of this lesion and no mention of it on op [facility] documentation agree w/mri brain w/ and w/o lyric requested by neuro  -neuro checks per stroke pathway, f/u mri    Primary hypertension  Assessment & Plan  Not on pharmacotherapy as pt is 'between providers'  -Likely worsened by recent nsaid use (pt takes it for back pain but was told by her dentist to take this for her tooth pain for which  she needs a periodontist).  -At this point recommending avoidance of all nsaids, d/w pt tylenol is only safe otc option with stroke on ct head  -permissive htn x48h           VTE Pharmacologic Prophylaxis: VTE Score: 8 High Risk (Score >/= 5) - Pharmacological DVT Prophylaxis Ordered: enoxaparin (Lovenox). Sequential Compression Devices Ordered.  Code Status: Level 1 - Full Code   Discussion with family:     Anticipated Length of Stay: Patient will be admitted on an observation basis with an anticipated length of stay of less than 2 midnights secondary to stroke.    Total Time Spent on Date of Encounter in care of patient:  mins. This time was spent on one or more of the following: performing physical exam; counseling and coordination of care; obtaining or reviewing history; documenting in the medical record; reviewing/ordering tests, medications or procedures; communicating with other healthcare professionals and discussing with patient's family/caregivers.    Chief Complaint: balance issues, possible facial droop x1d    History of Present Illness:  Sanam Castro is a 68 y.o. female with a PMH of sah, htn prior tobacco use  who presents with gait instability  coordination issues x 1d.  Pt notes she was in her normal state of health except for a dental issue in her right lower jaw that per her DDS requires a periodontist.  On the day prior to admission she went to see the dentist who recommended nsaids for pain and periodontal referral.  Thereafter she noted later that day gait instability in her living room w/fall w/o LOC or head strike onto her left shoulder.  She was able to get up on her own but noted she wasn't feeling dizzy/lightheaded simply off balance as if intoxicated.  No numbness/weakness or alcohol use the day of s/sx or day after.  She noted her s/x persisted and she unfortunately also had some issues w/getting her fingers to find/hit the right keys on her phone.  She was describing this and her  son came over and took her to ED for evaluation as he was concerned there could be possible left facial droop as well.    In ed case was d/w neuro.  No tnk as outside of window.  Asa/plavix load recommended with stroke pathway.  D/w neurology on admission as well given prior sah history who recommended continuation of dapt given no evidence of active bleed on ct imaging.  We will admit for stroke.    Review of Systems:  Review of Systems   Musculoskeletal:  Positive for gait problem.   Neurological:  Negative for dizziness, weakness, light-headedness and numbness.   All other systems reviewed and are negative.      Past Medical and Surgical History:   Past Medical History:   Diagnosis Date    History of ovarian cyst     Melanoma of shoulder, right (HCC)        Past Surgical History:   Procedure Laterality Date    OVARIAN CYST REMOVAL Left        Meds/Allergies:  Prior to Admission medications    Medication Sig Start Date End Date Taking? Authorizing Provider   Calcium Carb-Cholecalciferol (Caltrate 600+D3) 600-800 MG-UNIT TABS Take 1 tablet by mouth in the morning   Yes Historical Provider, MD   fluticasone (FLONASE) 50 mcg/act nasal spray 2 sprays into each nostril daily   Yes Historical Provider, MD   Ibuprofen (Advil) 200 MG CAPS Take 2 capsules by mouth 3 (three) times a day as needed   Yes Historical Provider, MD   loratadine (CLARITIN) 10 mg tablet Take 10 mg by mouth daily   Yes Historical Provider, MD   Multiple Vitamins-Minerals (Centrum Silver 50+Women) TABS Take 1 tablet by mouth daily   Yes Historical Provider, MD   olopatadine (PATANOL) 0.1 % ophthalmic solution Administer 1 drop to both eyes in the morning  Patient not taking: Reported on 5/4/2024 5/27/22   Dia Redman MD     I have reviewed home medications with patient personally.    Allergies: No Known Allergies    Social History:  Marital Status:    Occupation:   Patient Pre-hospital Living Situation:   Patient Pre-hospital Level of  "Mobility:   Patient Pre-hospital Diet Restrictions:   Substance Use History:   Social History     Substance and Sexual Activity   Alcohol Use Yes    Alcohol/week: 2.0 standard drinks of alcohol    Types: 1 Cans of beer, 1 Glasses of wine per week     Social History     Tobacco Use   Smoking Status Former    Current packs/day: 0.00    Average packs/day: 0.5 packs/day for 31.7 years (15.8 ttl pk-yrs)    Types: Cigarettes    Start date:     Quit date: 2016    Years since quittin.6   Smokeless Tobacco Never     Social History     Substance and Sexual Activity   Drug Use Not Currently       Family History:  Family History   Problem Relation Age of Onset    No Known Problems Mother     Diabetes Father     Heart murmur Father     No Known Problems Son     No Known Problems Son      Father-mini strokes  Physical Exam:     Vitals:   Blood Pressure: 163/87 (24)  Pulse: 68 (24)  Temperature: 98 °F (36.7 °C) (24)  Temp Source: Temporal (24)  Respirations: 18 (24)  Height: 5' 9\" (175.3 cm) (24 0845)  Weight - Scale: 78.7 kg (173 lb 8 oz) (24 1651)  SpO2: 98 % (24)    Physical Exam  Vitals reviewed.   Constitutional:       General: She is not in acute distress.     Appearance: She is not ill-appearing, toxic-appearing or diaphoretic.   HENT:      Head: Normocephalic and atraumatic.      Right Ear: External ear normal.      Left Ear: External ear normal.      Nose: Nose normal.   Eyes:      Extraocular Movements: Extraocular movements intact.      Comments: Fatigable horizontal nystagmus w/rightward gaze    Cardiovascular:      Rate and Rhythm: Normal rate and regular rhythm.      Heart sounds: Murmur (1/6 systolic murmur at r 2nd ics) heard.      No friction rub. No gallop.   Pulmonary:      Breath sounds: No wheezing, rhonchi or rales.   Chest:      Chest wall: No tenderness.   Abdominal:      General: There is no distension.      " Palpations: There is no mass.      Tenderness: There is no abdominal tenderness. There is no guarding or rebound.      Hernia: No hernia is present.   Musculoskeletal:      Right lower leg: No edema.      Left lower leg: No edema.   Skin:     General: Skin is warm and dry.   Neurological:      Mental Status: She is alert.      Cranial Nerves: No cranial nerve deficit.      Sensory: No sensory deficit.      Motor: No weakness.      Deep Tendon Reflexes: Reflexes normal.          Additional Data:     Lab Results:  Results from last 7 days   Lab Units 05/04/24  1718   WBC Thousand/uL 7.22   HEMOGLOBIN g/dL 14.3   HEMATOCRIT % 43.5   PLATELETS Thousands/uL 266   SEGS PCT % 68   LYMPHO PCT % 16   MONO PCT % 11   EOS PCT % 4     Results from last 7 days   Lab Units 05/04/24  1718   SODIUM mmol/L 140   POTASSIUM mmol/L 3.9   CHLORIDE mmol/L 105   CO2 mmol/L 29   BUN mg/dL 17   CREATININE mg/dL 0.61   ANION GAP mmol/L 6   CALCIUM mg/dL 10.2   ALBUMIN g/dL 4.4   TOTAL BILIRUBIN mg/dL 0.74   ALK PHOS U/L 72   ALT U/L 12   AST U/L 15   GLUCOSE RANDOM mg/dL 113                       Lines/Drains:  Invasive Devices       Peripheral Intravenous Line  Duration             Peripheral IV 05/04/24 Left Antecubital <1 day                        Imaging: Reviewed radiology reports from this admission including: CT head  CT head without contrast   Final Result by Yuval Harris MD (05/04 1827)         1. Age-indeterminate right thalamic lacunar infarction. Given the history of recent falling, this may represent a recent or subacute infarction. Further clinical assessment advised.   2. Large 2.5 cm extra-axial densely calcified mass with associated hyperostosis lateral to the left frontal lobe most likely a large meningioma with mild local mass effect. No subfalcine herniation or significant surrounding edema. Follow-up    contrast-enhanced MRI of the brain and nonemergent neurosurgical assessment suggested.   3. Mild  microangiopathy.   4. Extensive sinus disease with hyperdense elements possibly on the basis of fungal colonization or inspissated secretions. An obstructing mass including sinonasal polyposis or other etiologies not excluded. Follow-up nonemergent ENT assessment    recommended.   5. Left frontal scalp abnormality with peripheral calcifications possibly sebaceous cyst. Direct clinical assessment advised.   7. No acute intracranial hemorrhage.                  Workstation performed: [id number]         XR shoulder 2+ views LEFT   ED Interpretation by Ashley Jason DO (05/04 1739)   No fracture/dislocation      CTA head and neck w wo contrast    (Results Pending)   MRI Inpatient Order    (Results Pending)       EKG and Other Studies Reviewed on Admission:   EKG: nsr w/nonspecific t wave flattening inversion in iii/avf and v6.    ** Please Note: This note has been constructed using a voice recognition system. **

## 2024-05-05 NOTE — OCCUPATIONAL THERAPY NOTE
Occupational Therapy         Patient Name: Sanam Castro  Today's Date: 5/5/2024 05/05/24 1457   OT Last Visit   OT Visit Date 05/05/24   Note Type   Note type Evaluation;Cancelled Session   Cancel Reasons Other   Additional Comments OT consult received, chart reviewed. Pt s/p fall to L shoulder w/ pain, pending XR results thus will defer at this time. Will see pt as clincally appropriate.     Sharon Ruiz

## 2024-05-05 NOTE — CASE MANAGEMENT
Case Management Assessment & Discharge Planning Note    Patient name Sanam Castro  Location [facility] 4 /E4 -* MRN [id number]  : 1955 Date 2024       Current Admission Date: 2024  Current Admission Diagnosis:Stroke (cerebrum) (HCC)   Patient Active Problem List    Diagnosis Date Noted    Stroke (cerebrum) (HCC) 2024    Meningioma (HCC) 2024    Recurrent sinusitis 2024    Overweight with body mass index (BMI) of 26 to 26.9 in adult 2022    Primary hypertension 2022    Seasonal allergies 2022    Family history of diabetes mellitus (DM) 2022    Subarachnoid hemorrhage following injury, no loss of consciousness (HCC) 2022    Contusion of left side of back 2022      LOS (days): 0  Geometric Mean LOS (GMLOS) (days):   Days to GMLOS:     OBJECTIVE:              Current admission status: Observation       Preferred Pharmacy:   [id number] [address] [address]  Phone: [phone] Fax: [phone]    Primary Care Provider: No primary care provider on file.    Primary Insurance: [employer]  Secondary Insurance:     ASSESSMENT:  Active Health Care Proxies       stephanie ruthy [facility] Care Representative - Son   Primary Phone: [phone] (Mobile)                 Advance Directives  Does patient have a Health Care POA?: No  Was patient offered paperwork?: No  Does patient currently have a Health Care decision maker?: Yes, please see Health Care Proxy section  Does patient have Advance Directives?: No  Was patient offered paperwork?: No  Primary Contact: Ruthy Castro, son, [phone]         Readmission Root Cause  30 Day Readmission: No    Patient Information  Admitted from:: Home  Mental Status: Alert  During Assessment patient was accompanied by: Not accompanied during assessment  Assessment information provided by:: Patient  Primary Caregiver: Self  Support  Systems: Family members, Son, Children, Self  County of Residence: [place]  What [facility] do you live in?: [place]  Home entry access options. Select all that apply.: No steps to enter home  Type of Current Residence: Apartment  Floor Level: 10 (elevator available.)  Upon entering residence, is there a bedroom on the main floor (no further steps)?: Yes  Upon entering residence, is there a bathroom on the main floor (no further steps)?: Yes  Living Arrangements: Lives Alone  Is patient a ?: No    Activities of Daily Living Prior to Admission  Functional Status: Independent  Completes ADLs independently?: Yes  Ambulates independently?: Yes  Does patient use assisted devices?: No  Does patient currently own DME?: No  Does patient have a history of Outpatient Therapy (PT/OT)?: No  Does the patient have a history of Short-Term Rehab?: No  Does patient have a history of HHC?: No  Does patient currently have HHC?: No         Patient Information Continued  Income Source: SSI/SSD  Does patient have prescription coverage?: Yes  Does patient receive dialysis treatments?: No  Does patient have a history of substance abuse?: No         Means of Transportation  Means of Transport to Appts:: Drives Self      Social Determinants of Health (SDOH)      Flowsheet Row Most Recent Value   Housing Stability    In the last 12 months, was there a time when you were not able to pay the mortgage or rent on time? N   In the last 12 months, how many places have you lived? 2   In the last 12 months, was there a time when you did not have a steady place to sleep or slept in a shelter (including now)? N   Transportation Needs    In the past 12 months, has lack of transportation kept you from medical appointments or from getting medications? no   In the past 12 months, has lack of transportation kept you from meetings, work, or from getting things needed for daily living? No   Food Insecurity    Within the past 12 months, you worried that your  food would run out before you got the money to buy more. Never true   Within the past 12 months, the food you bought just didn't last and you didn't have money to get more. Never true   Utilities    In the past 12 months has the electric, gas, oil, or water company threatened to shut off services in your home? No            DISCHARGE DETAILS:    Discharge planning discussed with:: Patient  Freedom of Choice: Yes     CM contacted family/caregiver?: No- see comments (Patient is A&O x4)  Were Treatment Team discharge recommendations reviewed with patient/caregiver?: Yes  Did patient/caregiver verbalize understanding of patient care needs?: Yes  Were patient/caregiver advised of the risks associated with not following Treatment Team discharge recommendations?: Yes         Requested Home Health Care         Is the patient interested in HHC at discharge?:  (Patient stated that if it recommended, she would be agreeable to it, preferring to stay in network,)    DME Referral Provided  Referral made for DME?: No         Would you like to participate in our [facility] service program?  : Yes                                                 Additional Comments: Assessment completed with patient at bedside. Patient appeared alert, at-ease, and able to speak freely. Patient recently moved into a 10th floor apartment in October that has an elevator. 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 Before entering the hospital, patient was independent with ADLs, IADLs, only needing driving assistance for long rides, which son provided. No SDOH concerns. Agreeable to [facility] upon discharge. Patient mentioned that her sister, who lives in [place], is planning on moving to [place] to be closer. Has another son in [place], [place]. Uses no DME. Patient stated that if HHC is recommended, she would be agreeable, but would like to stay in network. Last, she  mentioned that the Friday she was admitted to the hospital, she was on the phone with [employer] trying to get an appointment with an in network Periodontist. [employer] representatives kept recommending dentists, which was reasonably frustrating. Patient stated son can provide ride home.

## 2024-05-05 NOTE — ASSESSMENT & PLAN NOTE
Not on pharmacotherapy as pt is 'between providers'  -Likely worsened by recent nsaid use (pt takes it for back pain but was told by her dentist to take this for her tooth pain for which she needs a periodontist).  -At this point recommending avoidance of all nsaids, d/w pt tylenol is only safe otc option with stroke on ct head  -permissive htn x48h

## 2024-05-05 NOTE — PLAN OF CARE
Problem: Neurological Deficit  Goal: Neurological status is stable or improving  Description: Interventions:  - Monitor and assess patient's level of consciousness, motor function, sensory function, and level of assistance needed for ADLs.   - Monitor and report changes from baseline. Collaborate with interdisciplinary team to initiate plan and implement interventions as ordered.   - Provide and maintain a safe environment.  - Consider seizure precautions.  - Consider fall precautions.  - Consider aspiration precautions.  - Consider bleeding precautions.  Outcome: Progressing     Problem: Activity Intolerance/Impaired Mobility  Goal: Mobility/activity is maintained at optimum level for patient  Description: Interventions:  - Assess and monitor patient  barriers to mobility and need for assistive/adaptive devices.  - Assess patient's emotional response to limitations.  - Collaborate with interdisciplinary team and initiate plans and interventions as ordered.  - Encourage independent activity per ability.  - Maintain proper body alignment.  - Perform active/passive rom as tolerated/ordered.  - Plan activities to conserve energy.  - Turn patient as appropriate  Outcome: Progressing

## 2024-05-05 NOTE — ASSESSMENT & PLAN NOTE
"68 y.o.  female with prior subarachnoid hemorrhage in the setting of traumatic fall in 2022, anxiety, who initially presented to [facility] on 5/4/24 due to 1 day of gait instability. Pt was loaded with aspirin and Plavix and recommended to be admitted on stroke pathway.     Workup  - CTH wo contrast 5/4/24:   \"1. Age-indeterminate right thalamic lacunar infarction. Given the history of recent falling, this may represent a recent or subacute infarction. Further clinical assessment advised. 2. Large 2.5 cm extra-axial densely calcified mass with associated hyperostosis lateral to the left frontal lobe most likely a large meningioma with mild local mass effect. No subfalcine herniation or significant surrounding edema. Follow-up contrast-enhanced MRI of the brain and nonemergent neurosurgical assessment suggested. 3. Mild microangiopathy. 4. Extensive sinus disease with hyperdense elements possibly on the basis of fungal colonization or inspissated secretions. An obstructing mass including sinonasal polyposis or other etiologies not excluded. Follow-up nonemergent ENT assessment recommended. 5. Left frontal scalp abnormality with peripheral calcifications possibly sebaceous cyst. Direct clinical assessment advised. 7. No acute intracranial hemorrhage.\"  - CTA H/N wwo contrast 5/4/24:   \"Redemonstration of lacunar infarct in the right thalamus that is favored to be acute or subacute. No acute hemorrhage. Chronic microangiopathy. Stable left posterior frontal/parietal calcified meningioma measuring 2.5 cm with localized mass effect. No edema. Severe calcified plaque at the left carotid bifurcation. Degree of stenosis is difficult to measure but left ICA is diffusely smaller than the right likely due to flow-limiting critical stenosis. Recommend vascular consult. No significant stenosis of the cervical right carotid or vertebral arteries No high-grade intracranial stenosis, large vessel occlusion or aneurysm Ectatic ascending " "thoracic aorta. Recommendation is for follow-up low radiation dose chest CT in one year.\"  - MRI brain wwo contrast 5/6/24:   \"Acute to subacute lacunar infarcts involving the right paramedian midbrain and thalamus with corresponding low attenuation in the right thalamus on recent CT. No associated hemorrhage. Left parietal calcified lesion with associated enhancement is consistent with a meningioma. Extensive pan paranasal sinus disease as described above with areas of low T2 signal consistent with inspissated secretions and/or fungal colonization. Restricted diffusion also noted within the right maxillary sinus which should be correlated clinically for the presence of acute sinusitis.\"  - Labs:   -Lipid Panel: total cholesterol 222,    - HBA 1C 5.7  - Echo : EF 60%, left atrium mildly dilated      suspect right thalamic infarct likely secondary to small vessel disease.      Plan  - Stroke pathway  MRI brain wwo contrast completed, see above    Appreciate vascular surgery evaluation for critical L ICA stenosis   From neurologic perspective, stroke is not in the distribution of which the L ICA is responsible for, so no immediate risk of reperfusion injury   Echo completed, see above   Carotid dopplers pending   Continue DAPT with Aspirin 81 mg and Plavix 75 mg daily x 21 days (last day 5/26/24), then stop Plavix and continue with ASA monotherapy   Atorvastatin 40 mg  Goal normotension with symptoms >24 hours from onset   Euglycemic, normothermic goal  telemetry  PT/OT/ST  Stroke education  Frequent neuro checks. Continue to monitor and notify neurology with any changes.  STAT CT head for any acute change in neuro exam  - Medical management and supportive care per primary team. Correction of any metabolic or infectious disturbances.         "

## 2024-05-05 NOTE — UTILIZATION REVIEW
Initial Clinical Review    OBSERVATION WRITTEN 5/4/24 @ 1849 CONVERTED TO INPATIENT ADMISSION 5/5/24 @ 1254 DUE TO FURTHER DIAGNOSTIC WORKUP REQUIRED FOR STROKE, REQUIRING AT LEAST A 2 MIDNIGHT STAY.    Admission: Date/Time/Statement:   Admission Orders (From admission, onward)       Ordered        05/05/24 1254  INPATIENT ADMISSION  Once            05/04/24 1849  Place in Observation  Once                          Orders Placed This Encounter   Procedures    INPATIENT ADMISSION     Standing Status:   Standing     Number of Occurrences:   1     Order Specific Question:   Level of Care     Answer:   Med Surg [16]     Order Specific Question:   Estimated length of stay     Answer:   More than 2 Midnights     Order Specific Question:   Certification     Answer:   I certify that inpatient services are medically necessary for this patient for a duration of greater than two midnights. See H&P and MD Progress Notes for additional information about the patient's course of treatment.     ED Arrival Information       Expected   -    Arrival   5/4/2024 16:45    Acuity   Urgent              Means of arrival   Wheelchair    Escorted by   Family Member    Service   Hospitalist    Admission type   Emergency              Arrival complaint   Shoulder Pain             Chief Complaint   Patient presents with    Shoulder Pain     Patient reports fall yesterday onto left shoulder. States she felt unsteady and fell and hit shoulder on table. Also reports recently seen at dentist for ulcer on right upper tooth, unsure if it's related to her feeling unsteady.        Initial Presentation: 68 y.o. female with PMHx: SAH, HTN, tobacco use, who presented to [facility] ED initially admitted Observation status then converted to Inpatient due to Stroke.  Presented due to gait instability coordination issues for 1d, fall onto left side without LOC or head strike. She was able to get up on her own but noted she wasn't feeling  dizzy/lightheaded simply off balance as if intoxicated.  No numbness/weakness or alcohol use the day of s/sx or day after.  She noted her s/x persisted and she unfortunately also had some issues w/getting her fingers to find/hit the right keys on her phone.  She was describing this and her son came over and took her to ED for evaluation as he was concerned there could be possible left facial droop as well. In the ED, case was d/w neuro and no TNK as outside of window.  Asa/plavix load recommended with stroke pathway.  Plan: med surg, Telemetry, Neurology consult, freq neuro checks and VS, baseline NIH stroke scale, order Echo and MRI brain, NPO, permissive HTN, accuchecks w/ SSI, order lipid panel, A1c, TSH, monitor IO and daily wts, PT OT ST eval, CM consult, inc spirometry.    5/5/2024 Inpatient Admission:  Weakness continues in left extremity. Continue above tx plan under stroke pathway. Fu on MRI and Echo, PT OT ST eval, continue med surg level of care.     Date:   5/6 Day 3: Has surpassed a 2nd midnight with active treatments and services.  Continue telemetry, order Neurosurgery and Vascular sx consults. Continue PT OT, labs, VS.    5/6 Per Vascular Sx: Right thalamic infarct, likely secondary to small vessel disease per neuro  -2.5 cm calcificed meningioma w/ mild local mass effect lateral to the L frontal lobe  -Neurology following; input appreciated  -CTA head and neck showed severe calcified plaque at the left carotid bifurcation w/ flow-limiting critical stenosis  -Order carotid artery duplex for further eval of ICA stenosis  -No immediate vascular intervention required  -Continue ASA, plavix and lipitor per neuro recommendations for dual purpose of recent stroke and ICA stenosis  -Will review carotid duplex results when available and make further recommendations    5/6 Per Neurosurgery: Continue monitor neurological exam.  CT and MRI brain imaging reviewed demonstrating presumed left sided 2.5 meningioma  with extensive calcification.  This is likely an incidental finding as patient symptoms more likely associated with acute to subacute right midbrain and thalamic infarcts.  Of note, patient did sustain a fall in March 2022 at which time she was diagnosed with a traumatic subarachnoid hemorrhage, images and report not available at this time but would recommend patient to follow-up to see if this finding was present and similar in size at that time.  No neurosurgical intervention indicated.  No transfer from a neurosurgical standpoint indicated.  Will be happy to see the patient in the outpatient setting to review her imaging and diagnosis as well as compared to prior imaging if she is able to obtain.    ED Triage Vitals   Temperature Pulse Respirations Blood Pressure SpO2   05/04/24 0845 05/04/24 0845 05/04/24 0845 05/04/24 0845 05/04/24 0845   97.9 °F (36.6 °C) 61 16 (!) 192/81 99 %      Temp Source Heart Rate Source Patient Position - Orthostatic VS BP Location FiO2 (%)   05/04/24 0845 05/04/24 1651 05/04/24 0845 05/04/24 0845 --   Temporal Monitor Lying Right arm       Pain Score       05/04/24 1651       7          Wt Readings from Last 1 Encounters:   05/05/24 74.8 kg (165 lb)     Additional Vital Signs:    Date/Time Temp Pulse Resp BP MAP (mmHg) SpO2 O2 Device Patient Position - Orthostatic VS   05/06/24 1111 97.6 °F (36.4 °C) 82 20 176/103 Abnormal  142 97 % None (Room air) Sitting   05/06/24 0828 97.7 °F (36.5 °C) 60 18 146/92 103 96 % None (Room air) Lying   05/06/24 0800 -- -- -- -- -- -- None (Room air) --   05/06/24 0317 97.3 °F (36.3 °C) Abnormal  60 18 141/76 90 96 % -- Lying   05/05/24 2329 98.5 °F (36.9 °C) 73 18 174/83 Abnormal  109 96 % -- Lying   05/05/24 2009 -- -- -- -- -- -- None (Room air) --   05/05/24 1916 98.4 °F (36.9 °C) 63 18 197/82 Abnormal  118 98 % -- Lying   05/05/24 1632 -- 70 -- 181/86 Abnormal  -- -- -- --   05/05/24 1506 97.3 °F (36.3 °C) Abnormal  72 18 181/86 Abnormal  112 98 %  None (Room air) Lying   05/05/24 1133 98.2 °F (36.8 °C) 81 18 155/90 122 98 % None (Room air) Sitting   05/05/24 0900 97.8 °F (36.6 °C) 92 20 119/63 92 98 % None (Room air) Sitting   05/05/24 0700 97.4 °F (36.3 °C) Abnormal  69 18 154/78 105 98 % None (Room air) Lying   05/05/24 0500 98 °F (36.7 °C) 65 18 176/93 Abnormal  138 98 % None (Room air) Lying   05/05/24 0300 97.8 °F (36.6 °C) 60 18 155/81 102 97 % None (Room air) Lying   05/05/24 0100 98 °F (36.7 °C) 65 18 140/82 101 97 % None (Room air) Lying   05/05/24 0000 97.8 °F (36.6 °C) 59 18 144/79 97 97 % None (Room air) Lying   05/04/24 2300 99.5 °F (37.5 °C) 67 18 147/84 107 96 % None (Room air) Lying   05/04/24 2200 98 °F (36.7 °C) 68 18 163/87 125 98 % -- Lying   05/04/24 2100 -- -- -- -- -- -- None (Room air) --   05/04/24 1930 -- 77 18 202/88 Abnormal  155 97 % -- --   05/04/24 1915 -- 71 18 -- 140 98 % None (Room air) --   05/04/24 1904 -- -- -- 201/88 Abnormal  -- -- -- --   05/04/24 1900 -- 62 18 201/88 Abnormal  126 95 % None (Room air) --   05/04/24 1855 -- 67 18 201/88 Abnormal  126 97 % None (Room air) Lying   05/04/24 1653 98.2 °F (36.8 °C) -- -- -- -- -- -- --   05/04/24 1651 -- 81 18 195/110 Abnormal  -- 96 % None (Room air) Sitting   05/04/24 0845 97.9 °F (36.6 °C) 61 16 192/81 Abnormal  109 99 % -- Lying     Pertinent Labs/Diagnostic Test Results:   5/5 ECHO:      Left Ventricle: Left ventricular cavity size is normal. Wall thickness is normal. The left ventricular ejection fraction is 60% by visual estimation. Systolic function is normal. Wall motion is normal. Diastolic function is mildly abnormal, consistent with grade I (abnormal) relaxation.    Right Ventricle: Right ventricular cavity size is normal. Systolic function is normal.    Left Atrium: The atrium is mildly dilated.    Aortic Valve: There is trace regurgitation. There is aortic valve sclerosis.    Mitral Valve: There is trace regurgitation.    Tricuspid Valve: Pulmonary artery  systolic pressures cannot be estimated due to lack of tricuspid regurgitation jet.    There is no study for comparison.     5/4 EKG: Sinus bradycardia  Nonspecific ST abnormality  Abnormal ECG    MRI brain w wo contrast   Final Result by Jesus Tolentino MD (05/06 1021)      Acute to subacute lacunar infarcts involving the right paramedian midbrain and thalamus with corresponding low attenuation in the right thalamus on recent CT. No associated hemorrhage.      Left parietal calcified lesion with associated enhancement is consistent with a meningioma.      Extensive pan paranasal sinus disease as described above with areas of low T2 signal consistent with inspissated secretions and/or fungal colonization. Restricted diffusion also noted within the right maxillary sinus which should be correlated clinically    for the presence of acute sinusitis.      The study was marked in EPIC for immediate notification.         Workstation performed: [id number]         CTA head and neck w wo contrast   Final Result by E. Alec Schoenberger, MD (05/05 0820)      Redemonstration of lacunar infarct in the right thalamus that is favored to be acute or subacute. No acute hemorrhage. Chronic microangiopathy.   Stable left posterior frontal/parietal calcified meningioma measuring 2.5 cm with localized mass effect. No edema.      Severe calcified plaque at the left carotid bifurcation. Degree of stenosis is difficult to measure but left ICA is diffusely smaller than the right likely due to flow-limiting critical stenosis. Recommend vascular consult.   No significant stenosis of the cervical right carotid or vertebral arteries   No high-grade intracranial stenosis, large vessel occlusion or aneurysm      Ectatic ascending thoracic aorta. Recommendation is for follow-up low radiation dose chest CT in one year.      Preliminary report provided by Dinsmore Steele.   The study was marked in EPIC for immediate notification.      Workstation  performed: [id number]         CT head without contrast   Final Result by Yuval Harris MD (05/04 1827)         1. Age-indeterminate right thalamic lacunar infarction. Given the history of recent falling, this may represent a recent or subacute infarction. Further clinical assessment advised.   2. Large 2.5 cm extra-axial densely calcified mass with associated hyperostosis lateral to the left frontal lobe most likely a large meningioma with mild local mass effect. No subfalcine herniation or significant surrounding edema. Follow-up    contrast-enhanced MRI of the brain and nonemergent neurosurgical assessment suggested.   3. Mild microangiopathy.   4. Extensive sinus disease with hyperdense elements possibly on the basis of fungal colonization or inspissated secretions. An obstructing mass including sinonasal polyposis or other etiologies not excluded. Follow-up nonemergent ENT assessment    recommended.   5. Left frontal scalp abnormality with peripheral calcifications possibly sebaceous cyst. Direct clinical assessment advised.   7. No acute intracranial hemorrhage.                  Workstation performed: [id number]         XR shoulder 2+ views LEFT   ED Interpretation by Ashley Jason DO (05/04 1739)   No fracture/dislocation      Final Result by Dinesh Lino MD (05/05 1924)      No acute osseous abnormality.         Workstation performed: [id number]         VAS carotid complete study    (Results Pending)         Results from last 7 days   Lab Units 05/06/24  0445 05/04/24  1718   WBC Thousand/uL 5.58 7.22   HEMOGLOBIN g/dL 14.3 14.3   HEMATOCRIT % 43.5 43.5   PLATELETS Thousands/uL 255 266   TOTAL NEUT ABS Thousands/µL  --  4.95         Results from last 7 days   Lab Units 05/06/24  0445 05/04/24  1718   SODIUM mmol/L 142 140   POTASSIUM mmol/L 3.2* 3.9   CHLORIDE mmol/L 106 105   CO2 mmol/L 26 29   ANION GAP mmol/L 10 6   BUN mg/dL 15 17   CREATININE mg/dL 0.60 0.61   EGFR ml/min/1.73sq  m 92 92   CALCIUM mg/dL 9.7 10.2     Results from last 7 days   Lab Units 05/04/24  1718   AST U/L 15   ALT U/L 12   ALK PHOS U/L 72   TOTAL PROTEIN g/dL 7.7   ALBUMIN g/dL 4.4   TOTAL BILIRUBIN mg/dL 0.74         Results from last 7 days   Lab Units 05/06/24  0445 05/04/24  1718   GLUCOSE RANDOM mg/dL 110 113         Results from last 7 days   Lab Units 05/05/24  0507   HEMOGLOBIN A1C % 5.7*   EAG mg/dl 117       Results from last 7 days   Lab Units 05/04/24  1902   CLARITY UA  Clear   COLOR UA  Yellow   SPEC GRAV UA  1.020   PH UA  5.5   GLUCOSE UA mg/dl Negative   KETONES UA mg/dl 15 (1+)*   BLOOD UA  Small*   PROTEIN UA mg/dl Trace*   NITRITE UA  Negative   BILIRUBIN UA  Negative   UROBILINOGEN UA E.U./dl 0.2   LEUKOCYTES UA  Large*   WBC UA /hpf 10-20*   RBC UA /hpf 4-10*   BACTERIA UA /hpf Occasional   EPITHELIAL CELLS WET PREP /hpf Occasional   MUCUS THREADS  Moderate*     Results from last 7 days   Lab Units 05/04/24  1902   URINE CULTURE  >100,000 cfu/ml     ED Treatment:   Medication Administration from 05/04/2024 1645 to 05/04/2024 2028         Date/Time Order Dose Route Action     05/04/2024 1904 EDT hydrALAZINE (APRESOLINE) injection 10 mg 10 mg Intravenous Given     05/04/2024 1903 EDT aspirin tablet 325 mg 325 mg Oral Given     05/04/2024 1903 EDT clopidogrel (PLAVIX) tablet 300 mg 300 mg Oral Given          Past Medical History:   Diagnosis Date    History of ovarian cyst     Melanoma of shoulder, right (HCC)      Present on Admission:   Primary hypertension      Admitting Diagnosis: Shoulder pain [M25.519]  Meningioma (HCC) [D32.9]  Lacunar infarction (HCC) [I63.81]  Stroke (cerebrum) (HCC) [I63.9]  Contusion of left shoulder, initial encounter [S40.012A]  Age/Sex: 68 y.o. female  Admission Orders:  Scheduled Medications:  aspirin, 81 mg, Oral, Daily  atorvastatin, 40 mg, Oral, QPM  clopidogrel, 75 mg, Oral, Daily  fluticasone, 2 spray, Nasal, Daily  heparin (porcine), 5,000 Units, Subcutaneous, Q8H  SALLIE  lidocaine, 1 patch, Topical, Daily      Continuous IV Infusions: none     PRN Meds:  acetaminophen, 650 mg, Oral, Q6H PRN  hydrALAZINE, 10 mg, Intravenous, Q4H PRN  oxyCODONE, 2.5 mg, Oral, Q6H PRN x4 thus far        IP CONSULT TO NEUROLOGY  IP CONSULT TO NEUROLOGY  IP CONSULT TO CASE MANAGEMENT  IP CONSULT TO PHYSICAL MEDICINE REHAB  IP CONSULT TO VASCULAR SURGERY  IP CONSULT TO NEUROSURGERY  IP CONSULT TO CARDIOLOGY    Network Utilization Review Department  ATTENTION: Please call with any questions or concerns to [phone] and carefully listen to the prompts so that you are directed to the right person. All voicemails are confidential.   For Discharge needs, contact Care Management DC Support Team at [phone] opt. 2  Send all requests for admission clinical reviews, approved or denied determinations and any other requests to dedicated fax number below belonging to the campus where the patient is receiving treatment. List of dedicated fax numbers for the Facilities:  FACILITY NAME UR FAX NUMBER   ADMISSION DENIALS (Administrative/Medical Necessity) [phone]   DISCHARGE SUPPORT TEAM (NETWORK) [phone]   PARENT CHILD HEALTH (Maternity/NICU/Pediatrics) [phone]   [facility] [phone]   [facility] [phone]   [facility] [phone]   [facility] [phone]   [facility] [phone]   [facility] [phone]   [facility] [phone]   [facility] [phone]   [facility] [phone]   Critical access hospital [phone]   [facility] [phone]   [facility] [place] [phone]

## 2024-05-05 NOTE — ASSESSMENT & PLAN NOTE
History of hypertension and meningioma presented to the hospital for left-sided weakness  Had a fall hitting left shoulder..  Suspicion for right thalamic infarct.  Stroke pathway with DAPT and atorvastatin.  Awaiting MRI and echo and PT/OT/SLP evaluations.

## 2024-05-05 NOTE — CONSULTS
Consultation - Neurology   Sanam Castro 68 y.o. female MRN: [id number]  Unit/Bed#: E4 -01 Encounter: [id number]      Assessment/Plan     * Stroke (cerebrum) (HCC)  Assessment & Plan  Sanam Castro is a 68 y.o.  female with a pertinent history of hypertension, prior subarachnoid hemorrhage in the setting of traumatic fall in 2022, anxiety who initially presented to the emergency department due to 1 day of gait instability. outside TNK window patient was loaded with aspirin and Plavix and recommended to be admitted on stroke pathway. CT head showed an age-indeterminate right thalamic infarct and 2.5 cm extra-axial densely calcified mass with associated hyperostosis lateral to the left frontal lobe most likely a large meningioma with mild local mass effect. No subfalcine herniation or significant surrounding edema. CTA head and neck revealed left ICA flow-limiting critical stenosis.  No LVO, aneurysm.    LDL: 129 and HBA 1C 5.7  Impression: Pending MRI brain with and without contrast, at this point suspect right thalamic infarct likely secondary to small vessel disease.  Continue dual antiplatelet therapy for 21 days at this point and then switch to monotherapy with aspirin 81 mg thereafter.  Continue statin. pending echo  Okay to normalize blood pressure.  PT/OT/speech.  Given CTA head and neck findings would recommend vascular surgery input        Meningioma (HCC)  Assessment & Plan  2.5 cm calcified mass likely meningioma  Pending MRI brain with and without gadolinium  Recommend follow-up with neurosurgery, likely this can be done outpatient however pending MRI          Recommendations for outpatient neurological follow up have yet to be determined.    History of Present Illness     HPI: Sanam Castro is a 68 y.o.  female who presented to the emergency department with gait instability and discoordination for 1 day.  He had initially been seen by her dentist due to right lower jaw pain, she had  "trialed NSAIDs and then reported later that day she was having gait instability in her living room she fell without loss of consciousness or head strike and fell on her shoulder.  She was able to get up on her own and noted that she felt off balance scribed as feeling \"intoxicated\".  Symptoms reported.  On arrival to the emergency department was concerned that she also had left facial droop.  This was initially discussed with neurology patient was outside TNK window patient was loaded with aspirin and Plavix and recommended to be admitted on stroke pathway.  CT head showed an age-indeterminate right thalamic infarct and 2.5 cm extra-axial densely calcified mass with associated hyperostosis lateral to the left frontal lobe most likely a large meningioma with mild local mass effect. No subfalcine herniation or significant surrounding edema.  CTA head and neck revealed left ICA flow-limiting critical stenosis.  No LVO, aneurysm.  LDL: 129 and HBA 1C 5.7    Inpatient consult to Neurology  Consult performed by: Uriel Claire MD  Consult ordered by: Livia Javier PA-C      Consult to Neurology  Consult performed by: Uriel Claire MD  Consult ordered by: Ashley Jason DO          Review of Systems   Constitutional:  Negative for chills and fever.   HENT:  Negative for ear pain and sore throat.    Eyes:  Negative for pain and visual disturbance.   Respiratory:  Negative for cough and shortness of breath.    Cardiovascular:  Negative for chest pain and palpitations.   Gastrointestinal:  Negative for abdominal pain and vomiting.   Genitourinary:  Negative for dysuria and hematuria.   Musculoskeletal:  Negative for arthralgias and back pain.   Skin:  Negative for color change and rash.   Neurological:  Negative for seizures and syncope.   All other systems reviewed and are negative.      Historical Information   Past Medical History:   Diagnosis Date    History of ovarian cyst     Melanoma of shoulder, right (HCC) "      Past Surgical History:   Procedure Laterality Date    OVARIAN CYST REMOVAL Left      Social History   Social History     Substance and Sexual Activity   Alcohol Use Yes    Alcohol/week: 2.0 standard drinks of alcohol    Types: 1 Cans of beer, 1 Glasses of wine per week     Social History     Substance and Sexual Activity   Drug Use Not Currently     E-Cigarette/Vaping    E-Cigarette Use Never User      E-Cigarette/Vaping Substances    Nicotine No     THC No     CBD No     Flavoring No     Other No     Unknown No      Social History     Tobacco Use   Smoking Status Former    Current packs/day: 0.00    Average packs/day: 0.5 packs/day for 31.7 years (15.8 ttl pk-yrs)    Types: Cigarettes    Start date:     Quit date: 2016    Years since quittin.6   Smokeless Tobacco Never     Family History:   Family History   Problem Relation Age of Onset    No Known Problems Mother     Diabetes Father     Heart murmur Father     No Known Problems Son     No Known Problems Son        Review of previous medical records was  completed.     Meds/Allergies   all current active meds have been reviewed, current meds:   Current Facility-Administered Medications   Medication Dose Route Frequency    acetaminophen (TYLENOL) tablet 650 mg  650 mg Oral Q6H PRN    aspirin chewable tablet 81 mg  81 mg Oral Daily    atorvastatin (LIPITOR) tablet 40 mg  40 mg Oral QPM    clopidogrel (PLAVIX) tablet 75 mg  75 mg Oral Daily    fluticasone (FLONASE) 50 mcg/act nasal spray 2 spray  2 spray Nasal Daily    heparin (porcine) subcutaneous injection 5,000 Units  5,000 Units Subcutaneous Q8H SALLIE    lidocaine (LIDODERM) 5 % patch 1 patch  1 patch Topical Daily    oxyCODONE (ROXICODONE) split tablet 2.5 mg  2.5 mg Oral Q6H PRN   , and PTA meds:   Prior to Admission Medications   Prescriptions Last Dose Informant Patient Reported? Taking?   Calcium Carb-Cholecalciferol (Caltrate 600+D3) 600-800 MG-UNIT TABS 5/3/2024 Self Yes Yes   Sig: Take 1  "tablet by mouth in the morning   Ibuprofen (Advil) 200 MG CAPS 2024 Self Yes Yes   Sig: Take 2 capsules by mouth 3 (three) times a day as needed   Multiple Vitamins-Minerals (Centrum Silver 50+Women) TABS 5/3/2024 Self Yes Yes   Sig: Take 1 tablet by mouth daily   fluticasone (FLONASE) 50 mcg/act nasal spray 2024 Self Yes Yes   Si sprays into each nostril daily   loratadine (CLARITIN) 10 mg tablet 2024 Self Yes Yes   Sig: Take 10 mg by mouth daily   olopatadine (PATANOL) 0.1 % ophthalmic solution Not Taking  No No   Sig: Administer 1 drop to both eyes in the morning   Patient not taking: Reported on 2024      Facility-Administered Medications: None       No Known Allergies    Objective   Vitals:Blood pressure 155/90, pulse 81, temperature 98.2 °F (36.8 °C), temperature source Temporal, resp. rate 18, height 5' 9\" (1.753 m), weight 75.2 kg (165 lb 12.6 oz), SpO2 98%, not currently breastfeeding.,Body mass index is 24.48 kg/m².    Intake/Output Summary (Last 24 hours) at 2024 1358  Last data filed at 2024 1301  Gross per 24 hour   Intake 480 ml   Output 200 ml   Net 280 ml       Invasive Devices:   Invasive Devices       Peripheral Intravenous Line  Duration             Peripheral IV 24 Left Antecubital <1 day                    Physical Exam  Vitals and nursing note reviewed.   Constitutional:       General: She is not in acute distress.     Appearance: She is well-developed.   HENT:      Head: Normocephalic and atraumatic.   Eyes:      Conjunctiva/sclera: Conjunctivae normal.   Cardiovascular:      Rate and Rhythm: Normal rate and regular rhythm.      Heart sounds: No murmur heard.  Pulmonary:      Effort: Pulmonary effort is normal. No respiratory distress.      Breath sounds: Normal breath sounds.   Abdominal:      Palpations: Abdomen is soft.      Tenderness: There is no abdominal tenderness.   Musculoskeletal:         General: No swelling.      Cervical back: Neck supple. "   Skin:     General: Skin is warm and dry.      Capillary Refill: Capillary refill takes less than 2 seconds.   Neurological:      Mental Status: She is alert and oriented to person, place, and time.      Coordination: Finger-Nose-Finger Test normal.   Psychiatric:         Mood and Affect: Mood normal.         Speech: Speech normal.       Neurologic Exam     Mental Status   Oriented to person, place, and time.   Speech: speech is normal   Level of consciousness: alert  Able to name object.     Cranial Nerves   Mild left-sided facial droop     Motor Exam     Strength   Strength 5/5 except as noted. Out of 5 strength throughout except at left upper extremity, exam significantly limited due to shoulder pain.  However handgrip 5 out of 5.     Sensory Exam   Light touch normal.     Gait, Coordination, and Reflexes     Coordination   Finger to nose coordination: normal    Tremor   Resting tremor: absent  Intention tremor: absent  Action tremor: absent    Reflexes   Reflexes 2+ except as noted.       Lab Results: I have personally reviewed pertinent reports.    Imaging Studies: I have personally reviewed pertinent reports.    EKG, Pathology, and Other Studies: I have personally reviewed pertinent reports.      Code Status: Level 1 - Full Code  Advance Directive and Living Will:      Power of :    POLST:

## 2024-05-05 NOTE — SPEECH THERAPY NOTE
Speech Language/Pathology  Speech/Language Pathology  Assessment    Patient Name: Sanam Castro  Today's Date: 5/5/2024     Problem List  Principal Problem:    Stroke (cerebrum) (HCC)  Active Problems:    Primary hypertension    Meningioma (HCC)    Recurrent sinusitis    Past Medical History  Past Medical History:   Diagnosis Date    History of ovarian cyst     Melanoma of shoulder, right (HCC)      Past Surgical History  Past Surgical History:   Procedure Laterality Date    OVARIAN CYST REMOVAL Left       Bedside Swallow Evaluation:    Summary:  Pt presented w/ oral and pharyngeal stages of swallow WNL. Positioned upright and alert. She fed herself pretzels and thin liquids were taken via straw with single/successive sips. She did report sore lower molar, however mastication was WNL. Bolus control, formation, and transfer were WNL. Swallows appeared prompt. Laryngeal rise appeared adequate. No overt s/s of aspiration. She denied difficulties with swallow po and medications. Stated she will take her medications whole with thin liquids. Pt denied recent unexpected weight loss or change in appetite.Stated she was happy with her breakfast this morning.     Recommendations:  Diet:Regular   Liquid:Thin   Meds:As tolerated  Positioning:Upright  Strategies: slow rate, alternate liquids with solids, swallow prior to additional po   Pt to take PO/Meds only when fully alert and upright.   Oral care  Aspiration precautions  Reflux precautions  Eval only, No f/u tx indicated.     Consider consult w/:  Rehab  Nutrition    H&P/Admit info/ pertinent provider notes: (PMH noted above)  Sanam Castro is a 68 y.o. female with a PMH of sah, htn prior tobacco use  who presents with gait instability  coordination issues x 1d.  Pt notes she was in her normal state of health except for a dental issue in her right lower jaw that per her DDS requires a periodontist.  On the day prior to admission she went to see the dentist who  "recommended nsaids for pain and periodontal referral.  Thereafter she noted later that day gait instability in her living room w/fall w/o LOC or head strike onto her left shoulder.  She was able to get up on her own but noted she wasn't feeling dizzy/lightheaded simply off balance as if intoxicated.  No numbness/weakness or alcohol use the day of s/sx or day after.  She noted her s/x persisted and she unfortunately also had some issues w/getting her fingers to find/hit the right keys on her phone.  She was describing this and her son came over and took her to ED for evaluation as he was concerned there could be possible left facial droop as well.     In ed case was d/w neuro.  No tnk as outside of window.  Asa/plavix load recommended with stroke pathway.  D/w neurology on admission as well given prior sah history who recommended continuation of dapt given no evidence of active bleed on ct imaging.  We will admit for stroke.      Special Studies:  CTA head and neck w wo contrast (05/05/2024)  Redemonstration of lacunar infarct in the right thalamus that is favored to be acute or subacute. No acute hemorrhage. Chronic microangiopathy.  Stable left posterior frontal/parietal calcified meningioma measuring 2.5 cm with localized mass effect. No edema.  Severe calcified plaque at the left carotid bifurcation. Degree of stenosis is difficult to measure but left ICA is diffusely smaller than the right likely due to flow-limiting critical stenosis. Recommend vascular consult.  No significant stenosis of the cervical right carotid or vertebral arteries  No high-grade intracranial stenosis, large vessel occlusion or aneurysm  Ectatic ascending thoracic aorta. Recommendation is for follow-up low radiation dose chest CT in one year.      Procalcitonin:    WBC: 7.22                         05/04/2024     Code Status  Level 1 full code     Previous MBS: none     Patient's goal: \" I am happy to be out of bed\"    Did the pt report " pain? no  If yes, was nursing notified/was it addressed? N/a     Reason for consult:  R/o aspiration  Determine safest and least restrictive diet    Precautions:  Fall    Food Allergies: No known    Current Diet: Regular and thin    Premorbid diet: Regular and thin    O2 requirement: Room air    Social/Prior living Lives alone   Voice/Speech: WNL   Follows commands: Basic    Cognitive status: Alert      Oral [facility] exam:  Dentition:Natural adequate, reported sore molar  Lips (VII):WNL  Tongue (XII): midline   Mandible (V):adequate ROM  Face/oral sensation (V):WNL, symmetrical   Velum (X):WNL    Items administered:  Pretzels and thin liquids via straw with single/successive sips     Oral stage:  Lip closure:WNL  Mastication:WNL  Bolus formation:WNL  Bolus control:WNL  Transfer:WNL    Pharyngeal stage:  Swallow promptness: prompt  Laryngeal rise:adequate   No overt s/s aspiration    Esophageal stage:  No s/s reported    Results d/w:  Pt,

## 2024-05-05 NOTE — ASSESSMENT & PLAN NOTE
Possible meningioma of 2.5cm of left frontal region on ct head.  Notably pt reports prior fall requiring sutures on that side remotely.  Per [facility] neurosx had SAH conservatively treated in [place] which had resolved at time of discharge in 03/22.  Given size of this lesion and no mention of it on op [facility] documentation agree w/mri brain w/ and w/o lyric requested by neuro  -neuro checks per stroke pathway, f/u mri

## 2024-05-06 ENCOUNTER — APPOINTMENT (INPATIENT)
Dept: MRI IMAGING | Facility: HOSPITAL | Age: 69
DRG: 065 | End: 2024-05-06
Payer: COMMERCIAL

## 2024-05-06 ENCOUNTER — TELEPHONE (OUTPATIENT)
Dept: NEUROSURGERY | Facility: CLINIC | Age: 69
End: 2024-05-06

## 2024-05-06 PROBLEM — I65.29 CAROTID ARTERY STENOSIS: Status: ACTIVE | Noted: 2024-05-06

## 2024-05-06 LAB
ANION GAP SERPL CALCULATED.3IONS-SCNC: 10 MMOL/L (ref 4–13)
ATRIAL RATE: 57 BPM
BACTERIA UR CULT: NORMAL
BUN SERPL-MCNC: 15 MG/DL (ref 5–25)
CALCIUM SERPL-MCNC: 9.7 MG/DL (ref 8.4–10.2)
CHLORIDE SERPL-SCNC: 106 MMOL/L (ref 96–108)
CO2 SERPL-SCNC: 26 MMOL/L (ref 21–32)
CREAT SERPL-MCNC: 0.6 MG/DL (ref 0.6–1.3)
ERYTHROCYTE [DISTWIDTH] IN BLOOD BY AUTOMATED COUNT: 13.1 % (ref 11.6–15.1)
GFR SERPL CREATININE-BSD FRML MDRD: 92 ML/MIN/1.73SQ M
GLUCOSE SERPL-MCNC: 110 MG/DL (ref 65–140)
HCT VFR BLD AUTO: 43.5 % (ref 34.8–46.1)
HGB BLD-MCNC: 14.3 G/DL (ref 11.5–15.4)
MCH RBC QN AUTO: 30 PG (ref 26.8–34.3)
MCHC RBC AUTO-ENTMCNC: 32.9 G/DL (ref 31.4–37.4)
MCV RBC AUTO: 91 FL (ref 82–98)
P AXIS: 41 DEGREES
PLATELET # BLD AUTO: 255 THOUSANDS/UL (ref 149–390)
PMV BLD AUTO: 10.4 FL (ref 8.9–12.7)
POTASSIUM SERPL-SCNC: 3.2 MMOL/L (ref 3.5–5.3)
PR INTERVAL: 160 MS
QRS AXIS: 2 DEGREES
QRSD INTERVAL: 102 MS
QT INTERVAL: 424 MS
QTC INTERVAL: 412 MS
RBC # BLD AUTO: 4.76 MILLION/UL (ref 3.81–5.12)
SODIUM SERPL-SCNC: 142 MMOL/L (ref 135–147)
T WAVE AXIS: -15 DEGREES
VENTRICULAR RATE: 57 BPM
WBC # BLD AUTO: 5.58 THOUSAND/UL (ref 4.31–10.16)

## 2024-05-06 PROCEDURE — 99233 SBSQ HOSP IP/OBS HIGH 50: CPT | Performed by: INTERNAL MEDICINE

## 2024-05-06 PROCEDURE — 99223 1ST HOSP IP/OBS HIGH 75: CPT | Performed by: SURGERY

## 2024-05-06 PROCEDURE — A9585 GADOBUTROL INJECTION: HCPCS | Performed by: INTERNAL MEDICINE

## 2024-05-06 PROCEDURE — 99232 SBSQ HOSP IP/OBS MODERATE 35: CPT | Performed by: PSYCHIATRY & NEUROLOGY

## 2024-05-06 PROCEDURE — 85027 COMPLETE CBC AUTOMATED: CPT | Performed by: INTERNAL MEDICINE

## 2024-05-06 PROCEDURE — 80048 BASIC METABOLIC PNL TOTAL CA: CPT | Performed by: INTERNAL MEDICINE

## 2024-05-06 PROCEDURE — 93010 ELECTROCARDIOGRAM REPORT: CPT | Performed by: INTERNAL MEDICINE

## 2024-05-06 PROCEDURE — 70553 MRI BRAIN STEM W/O & W/DYE: CPT

## 2024-05-06 PROCEDURE — 97162 PT EVAL MOD COMPLEX 30 MIN: CPT

## 2024-05-06 PROCEDURE — 97166 OT EVAL MOD COMPLEX 45 MIN: CPT

## 2024-05-06 PROCEDURE — 99223 1ST HOSP IP/OBS HIGH 75: CPT | Performed by: STUDENT IN AN ORGANIZED HEALTH CARE EDUCATION/TRAINING PROGRAM

## 2024-05-06 RX ORDER — AMLODIPINE BESYLATE 2.5 MG/1
2.5 TABLET ORAL DAILY
Status: DISCONTINUED | OUTPATIENT
Start: 2024-05-06 | End: 2024-05-07 | Stop reason: HOSPADM

## 2024-05-06 RX ORDER — GADOBUTROL 604.72 MG/ML
7 INJECTION INTRAVENOUS
Status: COMPLETED | OUTPATIENT
Start: 2024-05-06 | End: 2024-05-06

## 2024-05-06 RX ADMIN — CLOPIDOGREL BISULFATE 75 MG: 75 TABLET ORAL at 08:43

## 2024-05-06 RX ADMIN — Medication 2.5 MG: at 04:37

## 2024-05-06 RX ADMIN — LIDOCAINE 5% 1 PATCH: 700 PATCH TOPICAL at 08:43

## 2024-05-06 RX ADMIN — AMLODIPINE BESYLATE 2.5 MG: 2.5 TABLET ORAL at 15:09

## 2024-05-06 RX ADMIN — GADOBUTROL 7 ML: 604.72 INJECTION INTRAVENOUS at 09:45

## 2024-05-06 RX ADMIN — HEPARIN SODIUM 5000 UNITS: 5000 INJECTION INTRAVENOUS; SUBCUTANEOUS at 05:00

## 2024-05-06 RX ADMIN — ATORVASTATIN CALCIUM 40 MG: 40 TABLET, FILM COATED ORAL at 17:34

## 2024-05-06 RX ADMIN — ASPIRIN 81 MG CHEWABLE TABLET 81 MG: 81 TABLET CHEWABLE at 08:43

## 2024-05-06 RX ADMIN — HEPARIN SODIUM 5000 UNITS: 5000 INJECTION INTRAVENOUS; SUBCUTANEOUS at 21:19

## 2024-05-06 RX ADMIN — HEPARIN SODIUM 5000 UNITS: 5000 INJECTION INTRAVENOUS; SUBCUTANEOUS at 13:19

## 2024-05-06 RX ADMIN — FLUTICASONE PROPIONATE 2 SPRAY: 50 SPRAY, METERED NASAL at 08:43

## 2024-05-06 NOTE — PLAN OF CARE
Problem: Potential for Falls  Goal: Patient will remain free of falls  Description: INTERVENTIONS:  - Educate patient/family on patient safety including physical limitations  - Instruct patient to call for assistance with activity   - Consult OT/PT to assist with strengthening/mobility   - Keep Call bell within reach  - Keep bed low and locked with side rails adjusted as appropriate  - Keep care items and personal belongings within reach  - Initiate and maintain comfort rounds  - Make Fall Risk Sign visible to staff  - Offer Toileting every 2 Hours, in advance of need  - Initiate/Maintain bed alarm  - Obtain necessary fall risk management equipment: call bell  Problem: PAIN - ADULT  Goal: Verbalizes/displays adequate comfort level or baseline comfort level  Description: Interventions:  - Encourage patient to monitor pain and request assistance  - Assess pain using appropriate pain scale  - Administer analgesics based on type and severity of pain and evaluate response  - Implement non-pharmacological measures as appropriate and evaluate response  - Consider cultural and social influences on pain and pain management  - Notify physician/advanced practitioner if interventions unsuccessful or patient reports new pain  Outcome: Progressing     Problem: SAFETY ADULT  Goal: Patient will remain free of falls  Description: INTERVENTIONS:  - Educate patient/family on patient safety including physical limitations  - Instruct patient to call for assistance with activity   - Consult OT/PT to assist with strengthening/mobility   - Keep Call bell within reach  - Keep bed low and locked with side rails adjusted as appropriate  - Keep care items and personal belongings within reach  - Initiate and maintain comfort rounds  - Make Fall Risk Sign visible to staff  - Offer Toileting every 2 Hours, in advance of need  - Initiate/Maintain bed alarm  - Obtain necessary fall risk management equipment: call bell  Problem: DISCHARGE  PLANNING  Goal: Discharge to home or other facility with appropriate resources  Description: INTERVENTIONS:  - Identify barriers to discharge w/patient and caregiver  - Arrange for needed discharge resources and transportation as appropriate  - Identify discharge learning needs (meds, wound care, etc.)  - Arrange for interpretive services to assist at discharge as needed  - Refer to Case Management Department for coordinating discharge planning if the patient needs post-hospital services based on physician/advanced practitioner order or complex needs related to functional status, cognitive ability, or social support system  Outcome: Progressing     Problem: Knowledge Deficit  Goal: Patient/family/caregiver demonstrates understanding of disease process, treatment plan, medications, and discharge instructions  Description: Complete learning assessment and assess knowledge base.  Interventions:  - Provide teaching at level of understanding  - Provide teaching via preferred learning methods  Outcome: Progressing     Problem: CARDIOVASCULAR - ADULT  Goal: Maintains optimal cardiac output and hemodynamic stability  Description: INTERVENTIONS:  - Monitor I/O, vital signs and rhythm  - Monitor for S/S and trends of decreased cardiac output  - Administer and titrate ordered vasoactive medications to optimize hemodynamic stability  - Assess quality of pulses, skin color and temperature  - Assess for signs of decreased coronary artery perfusion  - Instruct patient to report change in severity of symptoms  Outcome: Progressing     Problem: RESPIRATORY - ADULT  Goal: Achieves optimal ventilation and oxygenation  Description: INTERVENTIONS:  - Assess for changes in respiratory status  - Assess for changes in mentation and behavior  - Position to facilitate oxygenation and minimize respiratory effort  - Oxygen administered by appropriate delivery if ordered  - Initiate smoking cessation education as indicated  - Encourage  broncho-pulmonary hygiene including cough, deep breathe, Incentive Spirometry  - Assess the need for suctioning and aspirate as needed  - Assess and instruct to report SOB or any respiratory difficulty  - Respiratory Therapy support as indicated  Outcome: Progressing     Problem: HEMATOLOGIC - ADULT  Goal: Maintains hematologic stability  Description: INTERVENTIONS  - Assess for signs and symptoms of bleeding or hemorrhage  - Monitor labs  - Administer supportive blood products/factors as ordered and appropriate  Outcome: Progressing     Problem: MUSCULOSKELETAL - ADULT  Goal: Maintain or return mobility to safest level of function  Description: INTERVENTIONS:  - Assess patient's ability to carry out ADLs; assess patient's baseline for ADL function and identify physical deficits which impact ability to perform ADLs (bathing, care of mouth/teeth, toileting, grooming, dressing, etc.)  - Assess/evaluate cause of self-care deficits   - Assess range of motion  - Assess patient's mobility  - Assess patient's need for assistive devices and provide as appropriate  - Encourage maximum independence but intervene and supervise when necessary  - Involve family in performance of ADLs  - Assess for home care needs following discharge   - Consider OT consult to assist with ADL evaluation and planning for discharge  - Provide patient education as appropriate  Outcome: Progressing     Problem: Neurological Deficit  Goal: Neurological status is stable or improving  Description: Interventions:  - Monitor and assess patient's level of consciousness, motor function, sensory function, and level of assistance needed for ADLs.   - Monitor and report changes from baseline. Collaborate with interdisciplinary team to initiate plan and implement interventions as ordered.   - Provide and maintain a safe environment.  - Consider seizure precautions.  - Consider fall precautions.  - Consider aspiration precautions.  - Consider bleeding  precautions.  Outcome: Progressing     Problem: Activity Intolerance/Impaired Mobility  Goal: Mobility/activity is maintained at optimum level for patient  Description: Interventions:  - Assess and monitor patient  barriers to mobility and need for assistive/adaptive devices.  - Assess patient's emotional response to limitations.  - Collaborate with interdisciplinary team and initiate plans and interventions as ordered.  - Encourage independent activity per ability.  - Maintain proper body alignment.  - Perform active/passive rom as tolerated/ordered.  - Plan activities to conserve energy.  - Turn patient as appropriate  Outcome: Progressing     - Apply yellow socks and bracelet for high fall risk patients  - Consider moving patient to room near nurses station  Outcome: Progressing  Goal: Maintain or return to baseline ADL function  Description: INTERVENTIONS:  -  Assess patient's ability to carry out ADLs; assess patient's baseline for ADL function and identify physical deficits which impact ability to perform ADLs (bathing, care of mouth/teeth, toileting, grooming, dressing, etc.)  - Assess/evaluate cause of self-care deficits   - Assess range of motion  - Assess patient's mobility; develop plan if impaired  - Assess patient's need for assistive devices and provide as appropriate  - Encourage maximum independence but intervene and supervise when necessary  - Involve family in performance of ADLs  - Assess for home care needs following discharge   - Consider OT consult to assist with ADL evaluation and planning for discharge  - Provide patient education as appropriate  Outcome: Progressing     - Apply yellow socks and bracelet for high fall risk patients  - Consider moving patient to room near nurses station  Outcome: Progressing

## 2024-05-06 NOTE — ASSESSMENT & PLAN NOTE
Previously followed with [facility] neurosurgery  Given questionable mass effect, case discussed with neurosurgery and will plan for outpatient follow-up  Incidentally found, heavily calcified

## 2024-05-06 NOTE — ASSESSMENT & PLAN NOTE
History of hypertension and meningioma presented to the hospital for left-sided weakness  Had a fall hitting left shoulder..  MRI with right thalamic infarct  Continue DAPT for 21 days  Not requiring inpatient rehab

## 2024-05-06 NOTE — TELEMEDICINE
"e-Consult (IPC)     Inpatient consult to Neurosurgery  Consult performed by: Belinda Suh PA-C  Consult ordered by: James Sams DO           Contacted by Dr. James Sams.    Sanam Castro 68 y.o. female MRN: [id number]  Unit/Bed#: E4 -01 Encounter: [id number]    Reason for Consult    Per provider report, patient presents with gait instability x 1 day.  CT imaging demonstrated 2.5 cm calcified mass with associated hyperostosis likely consistent with a meningioma with concerns for infarct and severe left ICA stenosis.  Follow-up MRI confirmed right thalamic and midbrain acute to subacute infarcts.  Neurology following and patient has been loaded with aspirin and Plavix and continued on stroke pathway.  Vascular has been consulted for carotid stenosis and carotid Dopplers pending.     Available past medical history,social history, surgical history, medication list, drug allergies and review of systems were reviewed.    BP (!) 176/103 (BP Location: Right arm)   Pulse 82   Temp 97.6 °F (36.4 °C) (Temporal)   Resp 20   Ht 5' 9\" (1.753 m)   Wt 74.8 kg (165 lb)   SpO2 97%   BMI 24.37 kg/m²      Clinical exam per chart review.  Cranial nerves grossly intact with the exception of a mild left facial weakness.  Moving all extremities 5 out of 5 except left upper extremity limited secondary to shoulder pain.  No sensory deficits.  No dysmetria.    Imaging personally reviewed.   -CT head wo 5/4/24: 2.5 cm densely calcified left frontal mass c/w meningioma. Left frontal scalp lesion possible sebaceous cyst. No ICH. Right thalamic infarct.   -CTA head/neck w/wo 5/4/24: right thalamic infarct and redemonstration of 2.5 cm calcified left frontal meningioma.  Severe calcified left carotid bifurcation  -MRI brain with without 5/6/2024: Acute to subacute right thalamic lacunar infarct as well as a right paramedian midbrain lacunar infarct.  Enhancing left parietal convexity lesion most consistent with " meningioma with associated calcifications.  No edema appreciated.    Assessment and Recommendations    Continue monitor neurological exam  CT and MRI brain imaging reviewed demonstrating presumed left sided 2.5 meningioma with extensive calcification.  This is likely an incidental finding as patient symptoms more likely associated with acute to subacute right midbrain and thalamic infarcts.  Of note, patient did sustain a fall in March 2022 at which time she was diagnosed with a traumatic subarachnoid hemorrhage, images and report not available at this time but would recommend patient to follow-up to see if this finding was present and similar in size at that time.  No neurosurgical intervention indicated.  No transfer from a neurosurgical standpoint indicated.  Will be happy to see the patient in the outpatient setting to review her imaging and diagnosis as well as compared to prior imaging if she is able to obtain.  Call with any questions or concerns in the interim.    All questions answered. Provider is in agreement with the course of action. 11-20 minutes, >50% of the total time devoted to medical consultative verbal/EMR discussion between providers. Written report will be generated in the EMR.

## 2024-05-06 NOTE — ASSESSMENT & PLAN NOTE
Patient pending carotid artery ultrasound for possible outpatient repair given high-grade stenosis  Appreciate vascular surgical recommendations  Continue DAPT

## 2024-05-06 NOTE — PLAN OF CARE
Problem: PHYSICAL THERAPY ADULT  Goal: Performs mobility at highest level of function for planned discharge setting.  See evaluation for individualized goals.  Description: Treatment/Interventions: Functional transfer training, LE strengthening/ROM, Therapeutic exercise, Patient/family training, Equipment eval/education, Bed mobility, Compensatory technique education, Gait training, OT  Equipment Recommended: Walker (per patient request)       See flowsheet documentation for full assessment, interventions and recommendations.  Note: Prognosis: Good  Problem List: Impaired balance, Pain  Assessment: Sanam Castro is a 68 y.o. female admitted to [facility] on 5/4/2024 for Stroke (cerebrum) (HCC). PT was consulted and pt was seen on 5/6/2024 for mobility assessment and d/c planning. Pt presents w medium fall risk, telemetry monitoring, acute pain L shoulder. At baseline is indep. Pt is currently functioning at a mod I- S for transfers, S for ambulation. Pt demonstrated ability to ambulate household distances. No significant improvement in balance w use of DME vs no AD. Self limited mobility secondary to fear of falling. Gait deviations secondary to pt guarded/ apprehensive. Sx likely to improve w continued mobility and confidence. Currently PT recommendations for DME include RW per patient request. Pt had tried the RW w OT and prefers use of RW > SPC at this time. The patient's AM-PAC Basic Mobility Inpatient Short Form Raw Score is 22. A Raw score of greater than 16 suggests the patient may benefit from discharge to home. Please also refer to the recommendation of the Physical Therapist for safe discharge planning.  Barriers to Discharge: None     Rehab Resource Intensity Level, PT: III (Minimum Resource Intensity) (OPPT for shoulder pain, balance prn)    See flowsheet documentation for full assessment.

## 2024-05-06 NOTE — PROGRESS NOTES
"Progress Note - Neurology   Sanam Castro 68 y.o. female [id number]  Unit/Bed#: East 4 /E4 -*    Assessment/Plan:      * Stroke (cerebrum) (HCC)  Assessment & Plan  68 y.o.  female with prior subarachnoid hemorrhage in the setting of traumatic fall in 2022, anxiety, who initially presented to [facility] on 5/4/24 due to 1 day of gait instability. Pt was loaded with aspirin and Plavix and recommended to be admitted on stroke pathway.     Workup  - CTH wo contrast 5/4/24:   \"1. Age-indeterminate right thalamic lacunar infarction. Given the history of recent falling, this may represent a recent or subacute infarction. Further clinical assessment advised. 2. Large 2.5 cm extra-axial densely calcified mass with associated hyperostosis lateral to the left frontal lobe most likely a large meningioma with mild local mass effect. No subfalcine herniation or significant surrounding edema. Follow-up contrast-enhanced MRI of the brain and nonemergent neurosurgical assessment suggested. 3. Mild microangiopathy. 4. Extensive sinus disease with hyperdense elements possibly on the basis of fungal colonization or inspissated secretions. An obstructing mass including sinonasal polyposis or other etiologies not excluded. Follow-up nonemergent ENT assessment recommended. 5. Left frontal scalp abnormality with peripheral calcifications possibly sebaceous cyst. Direct clinical assessment advised. 7. No acute intracranial hemorrhage.\"  - CTA H/N wwo contrast 5/4/24:   \"Redemonstration of lacunar infarct in the right thalamus that is favored to be acute or subacute. No acute hemorrhage. Chronic microangiopathy. Stable left posterior frontal/parietal calcified meningioma measuring 2.5 cm with localized mass effect. No edema. Severe calcified plaque at the left carotid bifurcation. Degree of stenosis is difficult to measure but left ICA is diffusely smaller than the right likely due to flow-limiting critical stenosis. Recommend " "vascular consult. No significant stenosis of the cervical right carotid or vertebral arteries No high-grade intracranial stenosis, large vessel occlusion or aneurysm Ectatic ascending thoracic aorta. Recommendation is for follow-up low radiation dose chest CT in one year.\"  - MRI brain wwo contrast 5/6/24:   \"Acute to subacute lacunar infarcts involving the right paramedian midbrain and thalamus with corresponding low attenuation in the right thalamus on recent CT. No associated hemorrhage. Left parietal calcified lesion with associated enhancement is consistent with a meningioma. Extensive pan paranasal sinus disease as described above with areas of low T2 signal consistent with inspissated secretions and/or fungal colonization. Restricted diffusion also noted within the right maxillary sinus which should be correlated clinically for the presence of acute sinusitis.\"  - Labs:   -Lipid Panel: total cholesterol 222,    - HBA 1C 5.7  - Echo : EF 60%, left atrium mildly dilated      suspect right thalamic infarct likely secondary to small vessel disease.      Plan  - Stroke pathway  MRI brain wwo contrast completed, see above    Appreciate vascular surgery evaluation for critical L ICA stenosis   From neurologic perspective, stroke is not in the distribution of which the L ICA is responsible for, so no immediate risk of reperfusion injury   Echo completed, see above   Carotid dopplers pending   Continue DAPT with Aspirin 81 mg and Plavix 75 mg daily x 21 days (last day 5/26/24), then stop Plavix and continue with ASA monotherapy   Atorvastatin 40 mg  Goal normotension with symptoms >24 hours from onset   Euglycemic, normothermic goal  telemetry  PT/OT/ST  Stroke education  Frequent neuro checks. Continue to monitor and notify neurology with any changes.  STAT CT head for any acute change in neuro exam  - Medical management and supportive care per primary team. Correction of any metabolic or infectious " disturbances.           Meningioma (HCC)  Assessment & Plan    - Recommend follow-up with neurosurgery as outpatient         Sanam Castro will need follow up in in 6 weeks with neurovascular attending  .  She will not require outpatient neurological testing.     Case and plan discussed with attending neurologist.  Please see attending attestation for any further recommendations and/or changes to plan.      Subjective:   Pt reports speech is at her baseline. Pt denies numbness and/or tingling. Pt reports she has been ambulating to the bathroom on her own and feels she has steady gait.         Past Medical History:   Diagnosis Date    History of ovarian cyst     Melanoma of shoulder, right (HCC)      Past Surgical History:   Procedure Laterality Date    OVARIAN CYST REMOVAL Left      Family History   Problem Relation Age of Onset    No Known Problems Mother     Diabetes Father     Heart murmur Father     No Known Problems Son     No Known Problems Son      Social History     Socioeconomic History    Marital status:      Spouse name: None    Number of children: None    Years of education: None    Highest education level: None   Occupational History    None   Tobacco Use    Smoking status: Former     Current packs/day: 0.00     Average packs/day: 0.5 packs/day for 31.7 years (15.8 ttl pk-yrs)     Types: Cigarettes     Start date:      Quit date: 2016     Years since quittin.6    Smokeless tobacco: Never   Vaping Use    Vaping status: Never Used   Substance and Sexual Activity    Alcohol use: Yes     Alcohol/week: 2.0 standard drinks of alcohol     Types: 1 Cans of beer, 1 Glasses of wine per week    Drug use: Not Currently    Sexual activity: Not Currently   Other Topics Concern    None   Social History Narrative    None     Social Determinants of Health     Financial Resource Strain: Low Risk  (2022)    Overall Financial Resource Strain (CARDIA)     Difficulty of Paying Living Expenses: Not  hard at all   Food Insecurity: No Food Insecurity (5/5/2024)    Hunger Vital Sign     Worried About Running Out of Food in the Last Year: Never true     Ran Out of Food in the Last Year: Never true   Transportation Needs: No Transportation Needs (5/5/2024)    PRAPARE - Transportation     Lack of Transportation (Medical): No     Lack of Transportation (Non-Medical): No   Physical Activity: Sufficiently Active (4/5/2022)    Exercise Vital Sign     Days of Exercise per Week: 7 days     Minutes of Exercise per Session: 30 min   Stress: No Stress Concern Present (4/5/2022)    [employer] - Occupational Stress Questionnaire     Feeling of Stress : Not at all   Social Connections: Moderately Isolated (4/5/2022)    Social Connection and Isolation Panel [NHANES]     Frequency of Communication with Friends and Family: More than three times a week     Frequency of Social Gatherings with Friends and Family: More than three times a week     Attends Spiritism Services: More than 4 times per year     Active Member of Clubs or Organizations: No     Attends Club or Organization Meetings: Never     Marital Status:    Intimate Partner Violence: Not At Risk (4/5/2022)    Humiliation, Afraid, Rape, and Kick questionnaire     Fear of Current or Ex-Partner: No     Emotionally Abused: No     Physically Abused: No     Sexually Abused: No   Housing Stability: Low Risk  (5/5/2024)    Housing Stability Vital Sign     Unable to Pay for Housing in the Last Year: No     Number of Places Lived in the Last Year: 2     Unstable Housing in the Last Year: No     E-Cigarette/Vaping    E-Cigarette Use Never User      E-Cigarette/Vaping Substances    Nicotine No     THC No     CBD No     Flavoring No     Other No     Unknown No          Medications:  All current active meds have been reviewed and current meds:  Scheduled Meds:  Current Facility-Administered Medications   Medication Dose Route Frequency Provider Last  "Rate    acetaminophen  650 mg Oral Q6H PRN Livia Javier PA-C      amLODIPine  2.5 mg Oral Daily James Sams, DO      aspirin  81 mg Oral Daily Livia Javier PA-C      atorvastatin  40 mg Oral QPM Livia Javier PA-C      clopidogrel  75 mg Oral Daily Livia Javier PA-C      fluticasone  2 spray Nasal Daily Livia Javier PA-C      heparin (porcine)  5,000 Units Subcutaneous Q8H SALLIE Livia Javier PA-C      hydrALAZINE  10 mg Intravenous Q4H PRN Clay Villalba, DO      lidocaine  1 patch Topical Daily Livia Javier PA-C      oxyCODONE  2.5 mg Oral Q6H PRN Livia Javier PA-C       Continuous Infusions:   PRN Meds:.  acetaminophen    hydrALAZINE    oxyCODONE       ROS:   Review of Systems   Musculoskeletal:  Positive for arthralgias (L shoulder).   Neurological:  Negative for weakness, numbness and headaches.             Vitals:   BP (!) 176/103 (BP Location: Right arm)   Pulse 82   Temp 97.6 °F (36.4 °C) (Temporal)   Resp 20   Ht 5' 9\" (1.753 m)   Wt 74.8 kg (165 lb)   SpO2 97%   BMI 24.37 kg/m²     Physical Exam:   Physical Exam  Vitals and nursing note reviewed.   HENT:      Mouth/Throat:      Mouth: Mucous membranes are moist.   Eyes:      General: No scleral icterus.        Right eye: No discharge.         Left eye: No discharge.      Extraocular Movements: Extraocular movements intact and EOM normal.      Conjunctiva/sclera: Conjunctivae normal.   Cardiovascular:      Rate and Rhythm: Normal rate.   Pulmonary:      Effort: Pulmonary effort is normal.   Neurological:      Mental Status: She is alert.      Coordination: Heel to Shin Test normal. Finger-nose-finger test: past pointing on R finger to nose, difficult to assess on L given L shoulder pain.  Psychiatric:      Comments: Pleasant and cooperative during exam        Neurologic Exam     Mental Status   Follows 1 step commands.   Attention: appropriately attends to provider. Concentration: no redirection or " "reorientation required during exam.   Speech: (No dysarthria appreciated on exam. Speech fluent. )  Level of consciousness: alert  Able to name object (glove, glasses, watch). Able to repeat (\"No ifs, ands, or buts.\").    -oriented to self   -oriented to month and year   -Oriented to place \"[facility]\"     Cranial Nerves     CN II   Visual acuity: (grossly intact)  Right visual field deficit: none  Left visual field deficit: none     CN III, IV, VI   Extraocular motions are normal.   Nystagmus: none   Conjugate gaze: present    CN V   Facial sensation intact.   Right facial sensation deficit: none  Left facial sensation deficit: none    CN VII   Right facial weakness: none  Left facial weakness: mild nasolabial fold flattening.    CN VIII   Hearing impaired: grossly intact.    CN XII   Tongue: not atrophic  Fasciculations: absent  Tongue deviation: right    Motor Exam   Muscle bulk: normal  Overall muscle tone: normal Strength to confrontation testing:  -Bilateral hand  5/5   -bilateral elbow flexion 5/5  - R elbow extension 5/5   - L elbow extension 4+/5  -R shoulder abduction 5/5   - L shoulder abduction 4+/5   -Bilateral plantar flexion and dorsiflexion 5/5   -bilateral knee flexion and extension 5/5   -Bilateral hip flexion 5/5     Sensory Exam   Light touch normal.   Right arm light touch: normal  Left arm light touch: normal  Right leg light touch: normal  Left leg light touch: normal  Right arm pinprick: normal  Left arm pinprick: normal  Right leg pinprick: normal  Left leg pinprick: normal   -extinction absent     Gait, Coordination, and Reflexes     Gait  Gait: (deferred for patient's safety)    Coordination   Finger-nose-finger test: past pointing on R finger to nose, difficult to assess on L given L shoulder pain.  Heel to shin coordination: normal          Labs: I have personally reviewed pertinent reports.   Recent Results (from the past 24 hour(s))   Echo complete w/ contrast if " indicated    Collection Time: 05/05/24  4:33 PM   Result Value Ref Range    AV peak gradient 35 mmHg    RAA A4C 13.2 cm2    LA Volume Index (BP) 17.8 mL/m2    MV Peak A Shay 0.79 m/s    MV stenosis pressure 1/2 time 70 ms    MV Peak E Shay 59 cm/s    AV peak gradient 12 mmHg    E wave deceleration time 243 ms    E/A ratio 0.75     MV valve area p 1/2 method 3.10     AV regurgitation pressure 1/2 time 800 ms    RA 2D Volume 31.0 mL    RVID d 2.8 cm    A4C EF 63 %    Left ventricular stroke volume (2D) 84.00 mL    IVSd 0.90 cm    Tricuspid annular plane systolic excursion 2.10 cm    Ao root 3.10 cm    LVPWd 0.80 cm    LA size 2.2 cm    LA volume (BP) 34 mL    FS 48 28 - 44    LVIDS 2.50 cm    IVS 0.9 cm    LVIDd 4.80 cm    LA length (A2C) 4.90 cm    AV Deceleration Time 2,760 ms    LEFT VENTRICLE SYSTOLIC VOLUME (MOD BIPLANE) 2D 23 mL    LV DIASTOLIC VOLUME (MOD BIPLANE) 2D 108 mL    Left Atrium Area-systolic Four Chamber 13.1 cm2    Left Atrium Area-systolic Apical Two Chamber 16.2 cm2    MV E' Tissue Velocity Lateral 8 cm/s    MV E' Tissue Velocity Septal 7 cm/s    LVSV, 2D 84 mL    BSA 1.9 m2    LV EF 60    Basic metabolic panel    Collection Time: 05/06/24  4:45 AM   Result Value Ref Range    Sodium 142 135 - 147 mmol/L    Potassium 3.2 (L) 3.5 - 5.3 mmol/L    Chloride 106 96 - 108 mmol/L    CO2 26 21 - 32 mmol/L    ANION GAP 10 4 - 13 mmol/L    BUN 15 5 - 25 mg/dL    Creatinine 0.60 0.60 - 1.30 mg/dL    Glucose 110 65 - 140 mg/dL    Calcium 9.7 8.4 - 10.2 mg/dL    eGFR 92 ml/min/1.73sq m   CBC    Collection Time: 05/06/24  4:45 AM   Result Value Ref Range    WBC 5.58 4.31 - 10.16 Thousand/uL    RBC 4.76 3.81 - 5.12 Million/uL    Hemoglobin 14.3 11.5 - 15.4 g/dL    Hematocrit 43.5 34.8 - 46.1 %    MCV 91 82 - 98 fL    MCH 30.0 26.8 - 34.3 pg    MCHC 32.9 31.4 - 37.4 g/dL    RDW 13.1 11.6 - 15.1 %    Platelets 255 149 - 390 Thousands/uL    MPV 10.4 8.9 - 12.7 fL       Imaging: I have personally reviewed pertinent  imaging in PACS, including CTH wo contrast 5/4/24, CTA H/N wwo contrast 5/4/24,  and I have personally reviewed PACS reports.     EKG, Pathology, and Other Studies: I have personally reviewed pertinent reports.       VTE Prophylaxis: Heparin          This note was completed in part utilizing Dragon Software.  Grammatical errors, random word insertions, spelling mistakes, and incomplete sentences may be an occasional consequence of this system secondary to software limitations, ambient noise, and hardware issues.  If you have any questions or concerns about the content, text, or information contained within the body of this dictation, please contact the provider for clarification.

## 2024-05-06 NOTE — TELEPHONE ENCOUNTER
5/8/24 - PT DISCHARGED TO HOME  F/U FOR PRESUMED MENINGIOMA    5/7/24 - PT STILL IN HOSPITAL    5/6/24 - PT IN [facility]  F/U FOR PRESUMED MENINGIOMA   **WILL CALL PT ONCE DISCHARGED**    Belinda Suh PA-C  [facility] [place] [facility]  Offer patient follow-up for presumed meningioma.  Request prior records from out side hospital - CT head 2022 if able.

## 2024-05-06 NOTE — PHYSICAL THERAPY NOTE
PHYSICAL THERAPY EVALUATION          Patient Name: Sanam Castro  Today's Date: 5/6/2024 05/06/24 1053   PT Last Visit   PT Visit Date 05/06/24   Note Type   Note type Evaluation   Pain Assessment   Pain Assessment Tool 0-10   Pain Score 8   Pain Location/Orientation Orientation: Left;Location: Shoulder   Hospital Pain Intervention(s) Emotional support  (decline ice, elevation)   Restrictions/Precautions   Weight Bearing Precautions Per Order No   Other Precautions Telemetry;Fall Risk;Pain   Home Living   Type of Home Apartment   Home Layout One level;Elevator   Bathroom Shower/Tub Tub/shower unit   Prior Function   Level of [place] Independent with ADLs;Independent with functional mobility;Independent with IADLS   Lives With Alone   IADLs Independent with driving;Independent with medication management;Independent with meal prep   Falls in the last 6 months 1 to 4   Comments indep at baseline. 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 complaints of L shoulder pain secondary to fall; trauma imaging negative. PMHx significant for meningioma, SAH, overweight   Cognition   Overall Cognitive Status WFL   Arousal/Participation Cooperative   Attention Within functional limits   Orientation Level Oriented X4   Memory Within functional limits   Following Commands Follows all commands and directions without difficulty   RLE Assessment   RLE Assessment WFL  (4+)   LLE Assessment   LLE Assessment WFL  (4+)   Coordination   Sensation WFL   Heel to [place] Intact   Transfers   Sit to Stand 6  Modified independent   Additional items Armrests   Stand to Sit 5  Supervision   Additional items Armrests;Increased time required   Ambulation/Elevation   Gait pattern Improper Weight shift;Decreased foot clearance;Short stride;Excessively slow;Inconsistent rah   Gait Assistance 5  Supervision   Additional items Assist x 1    Assistive Device SPC;None   Distance 35' w no AD, 40' w SPC   Balance   Static Standing Fair   Dynamic Standing Fair -   Ambulatory Fair -   Endurance Deficit   Endurance Deficit No   Activity Tolerance   Activity Tolerance Patient tolerated treatment well;Other (Comment)  (self limiting dt fear of falling)   Medical Staff Made Aware OT   Assessment   Prognosis Good   Problem List Impaired balance;Pain   Assessment Sanam Castro is a 68 y.o. female admitted to [facility] on 5/4/2024 for Stroke (cerebrum) (HCC). PT was consulted and pt was seen on 5/6/2024 for mobility assessment and d/c planning. Pt presents w medium fall risk, telemetry monitoring, acute pain L shoulder. At baseline is indep. Pt is currently functioning at a mod I- S for transfers, S for ambulation. Pt demonstrated ability to ambulate household distances. No significant improvement in balance w use of DME vs no AD. Self limited mobility secondary to fear of falling. Gait deviations secondary to pt guarded/ apprehensive. Sx likely to improve w continued mobility and confidence. Currently PT recommendations for DME include RW per patient request. Pt had tried the RW w OT and prefers use of RW > SPC at this time. The patient's AM-PAC Basic Mobility Inpatient Short Form Raw Score is 22. A Raw score of greater than 16 suggests the patient may benefit from discharge to home. Please also refer to the recommendation of the Physical Therapist for safe discharge planning.   Barriers to Discharge None   Goals   Patient Goals not fall   STG Expiration Date 05/16/24   Short Term Goal #1 1).  Pt will perform bed mobility indep demonstrating appropriate technique 100% of the time in order to improve function.2)  Perform all transfers with Fred demonstrating safe and appropriate technique 100% of the time in order to improve ability to negotiate safely in home environment.3) Amb with least restrictive AD > 150'x1 with mod I in order to demonstrate ability to  negotiate in home environment.4)  Improve overall strength and balance 1/2 grade in order to optimize ability to perform functional tasks and reduce fall risk.5) Increase activity tolerance to 45 minutes in order to improve endurance to functional tasks.6) PT for ongoing patient and family/caregiver education, DME needs and d/c planning in order to promote highest level of function in least restrictive environment.   Plan   Treatment/Interventions Functional transfer training;LE strengthening/ROM;Therapeutic exercise;Patient/family training;Equipment eval/education;Bed mobility;Compensatory technique education;Gait training;OT   PT Frequency 1-2x/wk   Discharge Recommendation   Rehab Resource Intensity Level, PT III (Minimum Resource Intensity)  (OPPT for shoulder pain, balance prn)   Equipment Recommended Walker  (per patient request)   Walker Package Recommended Wheeled walker   Change/add to Walker Package? No   AM-PAC Basic Mobility Inpatient   Turning in Flat Bed Without Bedrails 4   Lying on Back to Sitting on Edge of Flat Bed Without Bedrails 4   Moving Bed to Chair 4   Standing Up From Chair Using Arms 4   Walk in Room 3   Climb 3-5 Stairs With Railing 3   Basic Mobility Inpatient Raw Score 22   Basic Mobility Standardized Score 47.4   [facility] Highest Level Of Mobility   -HLM Goal 7: Walk 25 feet or more   JH-HLM Achieved 7: Walk 25 feet or more   End of Consult   Patient Position at End of Consult Bedside chair;All needs within reach       Brigid Arellano, PT

## 2024-05-06 NOTE — ASSESSMENT & PLAN NOTE
69 yo female w/ a hx of HTN, hx of SAH secondary to fall in 2022, anxiety, ovarian cyst and recurrent sinusitis presented to [facility] on 5/4/24 w/ gait instability x1 day. Pt was outside the window for TNK and was given ASA and plavix. CT head showed an age-indeterminate right thalamic infarct and 2.5 cm extra-axial densely calcified mass with associated hyperostosis lateral to the left frontal lobe most likely a large meningioma with mild local mass effect. CTA showed L ICA critical stenosis. Pt was seen in consult by neurology who suspects R thalamic infarct is likely secondary to small vessel disease. Per neurology; continue DAPT x21 days, then continue ASA only.    Vascular surgery consulted for ICA stenosis seen on CTA. CTA head and neck revealed severe calcified plaque at the left carotid bifurcation w/ flow-limiting critical stenosis. Pt has no prior vascular or cardiovascular hx.    Diagnostics:  -5/6/24 MRI Brain: Acute to subacute lacunar infarcts involving the right paramedian midbrain and thalamus with corresponding low attenuation in the right thalamus on recent CT. No associated hemorrhage. Left parietal calcified lesion with associated enhancement is consistent with a meningioma. Extensive pan paranasal sinus disease as described above with areas of low T2 signal consistent with inspissated secretions and/or fungal colonization. Restricted diffusion also noted within the right maxillary sinus which should be correlated clinically for the presence of acute sinusitis.  -5/4/24 CTA head and neck: Redemonstration of lacunar infarct in the right thalamus that is favored to be acute or subacute. No acute hemorrhage. Chronic microangiopathy. Stable left posterior frontal/parietal calcified meningioma measuring 2.5 cm with localized mass effect. No edema. Severe calcified plaque at the left carotid bifurcation. Degree of stenosis is difficult to measure but left ICA is diffusely smaller than the right likely due  to flow-limiting critical stenosis. No significant stenosis of the cervical right carotid or vertebral arteries. No high-grade intracranial stenosis, large vessel occlusion or aneurysm. Ectatic ascending thoracic aorta. Recommendation is for follow-up low radiation dose chest CT in one year.  -5/4/24 CT head: Age-indeterminate right thalamic lacunar infarction. Large 2.5 cm extra-axial densely calcified mass with associated hyperostosis lateral to the left frontal lobe most likely a large meningioma with mild local mass effect. No subfalcine herniation or significant surrounding edema. Mild microangiopathy. Extensive sinus disease with hyperdense elements possibly on the basis of fungal colonization or inspissated secretions. An obstructing mass including sinonasal polyposis or other etiologies not excluded. Left frontal scalp abnormality with peripheral calcifications possibly sebaceous cyst. No acute intracranial hemorrhage.    Plan:  -Right thalamic infarct, likely secondary to small vessel disease per neuro  -2.5 cm calcificed meningioma w/ mild local mass effect lateral to the L frontal lobe  -Neurology following; input appreciated  -CTA head and neck showed severe calcified plaque at the left carotid bifurcation w/ flow-limiting critical stenosis  -Order carotid artery duplex for further eval of ICA stenosis  -No immediate vascular intervention required  -Continue ASA, plavix and lipitor per neuro recommendations for dual purpose of recent stroke and ICA stenosis  -Will review carotid duplex results when available and make further recommendations  -Will discuss w/ Dr. Renee

## 2024-05-06 NOTE — CONSULTS
Consultation - General Cardiology Team   Sanam Castro 68 y.o. female MRN: [id number]  Unit/Bed#: E4 -01 Encounter: [id number]      Inpatient consult to Cardiology  Consult performed by: Tiffanie Estevez MD  Consult ordered by: SACHA Sen      PCP: No primary care provider on file.   Outpatient Cardiologist: NONE    History of Present Illness   Physician Requesting Consult: James Sams DO  Reason for Consult / Principal Problem: PRE OP cardiac risk stratification    HPI: Sanam Castro is a 68 y.o. year old female with a history of traumatic SAH in 2022, HTN, anxiety who presented with gait instability and fall with associated left facial droop on 5/4. At  the ED significantly elevated BP /110,  no focal deficits  She had CTH neg for bleed and CTA with with bilteral TANG. MRI confirmed right punctate thalamic stroke  Patient is admitted under the stroke pathway and vascular surgery on board for management of carotid artery stenosis. Incidentally found to have 2.5 cm meningioma, neurosurgery on board and do not recommend acute surgical intervention. Cardiology Is consulted for Pre OP assessment/cardiac clearance for Left CEA      METS 4-10  No active anginal symptoms  No hx of CHF ,echo reviewed with GDD 1 with EF 60%, EKG non specific changes   No family hx or premature CAD, hx of stroke In her father  Patient is former tobacco user- 1 pack/year for about 15 years, she stopped smok    Type of surgery left carotid endarterectomy- intermediate to high  risk surgery   Anesthesia- general       Review of Systems  Review of system was conducted and was negative except for as stated in the HPI.      Historical Information   Past Medical History:   Diagnosis Date    History of ovarian cyst     Melanoma of shoulder, right (HCC)      Past Surgical History:   Procedure Laterality Date    OVARIAN CYST REMOVAL Left      Social History     Substance and Sexual Activity   Alcohol Use  "Yes    Alcohol/week: 2.0 standard drinks of alcohol    Types: 1 Cans of beer, 1 Glasses of wine per week     Social History     Substance and Sexual Activity   Drug Use Not Currently     Social History     Tobacco Use   Smoking Status Former    Current packs/day: 0.00    Average packs/day: 0.5 packs/day for 31.7 years (15.8 ttl pk-yrs)    Types: Cigarettes    Start date:     Quit date: 2016    Years since quittin.6   Smokeless Tobacco Never     Family History:   Family History   Problem Relation Age of Onset    No Known Problems Mother     Diabetes Father     Heart murmur Father     No Known Problems Son     No Known Problems Son        Meds/Allergies   Hospital Medications:   Current Facility-Administered Medications   Medication Dose Route Frequency    acetaminophen (TYLENOL) tablet 650 mg  650 mg Oral Q6H PRN    aspirin chewable tablet 81 mg  81 mg Oral Daily    atorvastatin (LIPITOR) tablet 40 mg  40 mg Oral QPM    clopidogrel (PLAVIX) tablet 75 mg  75 mg Oral Daily    fluticasone (FLONASE) 50 mcg/act nasal spray 2 spray  2 spray Nasal Daily    heparin (porcine) subcutaneous injection 5,000 Units  5,000 Units Subcutaneous Q8H SALLIE    hydrALAZINE (APRESOLINE) injection 10 mg  10 mg Intravenous Q4H PRN    lidocaine (LIDODERM) 5 % patch 1 patch  1 patch Topical Daily    oxyCODONE (ROXICODONE) split tablet 2.5 mg  2.5 mg Oral Q6H PRN     Home Medications:   Medications Prior to Admission   Medication    Calcium Carb-Cholecalciferol (Caltrate 600+D3) 600-800 MG-UNIT TABS    fluticasone (FLONASE) 50 mcg/act nasal spray    Ibuprofen (Advil) 200 MG CAPS    loratadine (CLARITIN) 10 mg tablet    Multiple Vitamins-Minerals (Centrum Silver 50+Women) TABS    olopatadine (PATANOL) 0.1 % ophthalmic solution       No Known Allergies    Objective   Vitals: Blood pressure (!) 176/103, pulse 82, temperature 97.6 °F (36.4 °C), temperature source Temporal, resp. rate 20, height 5' 9\" (1.753 m), weight 74.8 kg (165 lb), " "SpO2 97%, not currently breastfeeding.  Orthostatic Blood Pressures      Flowsheet Row Most Recent Value   Blood Pressure 176/103 filed at 05/06/2024 1111   Patient Position - Orthostatic VS Sitting filed at 05/06/2024 1111              Invasive Devices       Peripheral Intravenous Line  Duration             Peripheral IV 05/04/24 Left Antecubital 1 day                    Physical Exam    GEN: Sanam Castro appears well, alert and oriented x 3, pleasant and cooperative   HEENT:  Normocephalic, atraumatic, anicteric, moist mucous membranes  NECK: No JVD or carotid bruits   HEART: marva rhythm, marva rate, normal S1 and S2,  systolic murmurs,  no clicks, gallops or rubs   LUNGS: Clear to auscultation bilaterally; no wheezes, rales, or rhonchi; respiration nonlabored   ABDOMEN:  Normoactive bowel sounds, soft, no tenderness, no distention  EXTREMITIES: peripheral pulses palpable; no edema  NEURO: no gross focal findings; cranial nerves grossly intact   SKIN:  Dry, intact, warm to touch    Lab Results: CBC with diff:   Results from last 7 days   Lab Units 05/06/24  0445   WBC Thousand/uL 5.58   RBC Million/uL 4.76   HEMOGLOBIN g/dL 14.3   HEMATOCRIT % 43.5   MCV fL 91   MCH pg 30.0   MCHC g/dL 32.9   RDW % 13.1   MPV fL 10.4   PLATELETS Thousands/uL 255     CMP:   Results from last 7 days   Lab Units 05/06/24  0445 05/04/24  1718   SODIUM mmol/L 142 140   CHLORIDE mmol/L 106 105   CO2 mmol/L 26 29   BUN mg/dL 15 17   CREATININE mg/dL 0.60 0.61   CALCIUM mg/dL 9.7 10.2   AST U/L  --  15   ALT U/L  --  12   ALK PHOS U/L  --  72   EGFR ml/min/1.73sq m 92 92     HS Troponin: No results found for: \"HSTNI\", \"HSTNI0\", \"HSTNI2\", \"HSTNI4\"  BNP:   Results from last 7 days   Lab Units 05/06/24  0445   POTASSIUM mmol/L 3.2*   CHLORIDE mmol/L 106   CO2 mmol/L 26   BUN mg/dL 15   CREATININE mg/dL 0.60   CALCIUM mg/dL 9.7   EGFR ml/min/1.73sq m 92     Coags:     TSH:     Magnesium:     Lipid Profile:   Results from last 7 days "   Lab Units 05/05/24  0506   HDL mg/dL 49*   LDL CALC mg/dL 129*   TRIGLYCERIDES mg/dL 221*             Results from last 7 days   Lab Units 05/06/24  0445 05/04/24  1718   POTASSIUM mmol/L 3.2* 3.9   CO2 mmol/L 26 29   CHLORIDE mmol/L 106 105   BUN mg/dL 15 17   CREATININE mg/dL 0.60 0.61     Results from last 7 days   Lab Units 05/06/24  0445 05/04/24  1718   HEMOGLOBIN g/dL 14.3 14.3   HEMATOCRIT % 43.5 43.5   PLATELETS Thousands/uL 255 266     Results from last 7 days   Lab Units 05/05/24  0507 05/05/24  0506   TRIGLYCERIDES mg/dL  --  221*   HDL mg/dL  --  49*   LDL CALC mg/dL  --  129*   HEMOGLOBIN A1C % 5.7*  --          Imaging: I have personally reviewed pertinent reports.   and I have personally reviewed pertinent films in PACS      Telemetry:   none    EKG:   Date: 5/4  Interpretation: no ST-T elevation or depression       Previous STRESS TEST:  No results found for this or any previous visit.     No results found for this or any previous visit.    No results found for this or any previous visit.      Previous Cath/PCI:  No results found for this or any previous visit.    No results found for this or any previous visit.    No results found for this or any previous visit.      ECHO:  No results found for this or any previous visit.    Results for orders placed during the hospital encounter of 05/04/24    Echo complete w/ contrast if indicated    Interpretation Summary    Left Ventricle: Left ventricular cavity size is normal. Wall thickness is normal. The left ventricular ejection fraction is 60% by visual estimation. Systolic function is normal. Wall motion is normal. Diastolic function is mildly abnormal, consistent with grade I (abnormal) relaxation.    Right Ventricle: Right ventricular cavity size is normal. Systolic function is normal.    Left Atrium: The atrium is mildly dilated.    Aortic Valve: There is trace regurgitation. There is aortic valve sclerosis.    Mitral Valve: There is trace  regurgitation.    Tricuspid Valve: Pulmonary artery systolic pressures cannot be estimated due to lack of tricuspid regurgitation jet.    There is no study for comparison.      FERNANDO:  No results found for this or any previous visit.    No results found for this or any previous visit.      CMR:  No results found for this or any previous visit.    No results found for this or any previous visit.    No results found for this or any previous visit.      HOLTER  No results found for this or any previous visit.    No results found for this or any previous visit.        VTE Prophylaxis: Heparin       Assessment/Plan     Assessment:    Pre op cardiovascular exam   Right punctate thalamic stroke  Severe left carotid artery stenosis-   Hypertension- chronic not on home BP meds  Hyperlipidemia - new at this admission   Ectasy of aorta- incidental        Plan:    Patient has low  to intermediate risk MACE-Can proceed with surgery without further cardiac work up  Optimize bloods pressure- can increase amlodipine to 5 mg daily. Keep -160  to avoid hypoperfusion from severe left TANG. Avoid NSAID, dietary modification with low salt diet.  Continue High intensity statin for HLD- with gaol of LDL<70. Dietary modification  Follow up with vascular surgery on OPT for aortic ectasia, consider repeat CTA or ultrasound          Case discussed and reviewed with Dr. Mcgee  who agrees with my assessment and plan.    Thank you for involving us in the care of your patient.      Tiffanie Estevez MD    ==========================================================================================          Epic/ Allscripts/Care Everywhere records reviewed:     ** Please Note: Fluency DirectDictation voice to text software may have been used in the creation of this document. **

## 2024-05-06 NOTE — CONSULTS
[facility]  Consult  Name: Sanam Castro 68 y.o. female I MRN: [id number]  Unit/Bed#: E4 -01 I Date of Admission: 5/4/2024   Date of Service: 5/6/2024 I Hospital Day: 1    Inpatient consult to Vascular Surgery  Consult performed by: SACHA Sen  Consult ordered by: James Sams, DO          Assessment/Plan   Carotid artery stenosis  Assessment & Plan  67 yo female w/ a hx of HTN, hx of SAH secondary to fall in 2022, anxiety, ovarian cyst and recurrent sinusitis presented to [facility] on 5/4/24 w/ gait instability x1 day. Pt was outside the window for TNK and was given ASA and plavix. CT head showed an age-indeterminate right thalamic infarct and 2.5 cm extra-axial densely calcified mass with associated hyperostosis lateral to the left frontal lobe most likely a large meningioma with mild local mass effect. CTA showed L ICA critical stenosis. Pt was seen in consult by neurology who suspects R thalamic infarct is likely secondary to small vessel disease. Per neurology; continue DAPT x21 days, then continue ASA only.    Vascular surgery consulted for ICA stenosis seen on CTA. CTA head and neck revealed severe calcified plaque at the left carotid bifurcation w/ flow-limiting critical stenosis. Pt has no prior vascular or cardiovascular hx.    Diagnostics:  -5/6/24 MRI Brain: Acute to subacute lacunar infarcts involving the right paramedian midbrain and thalamus with corresponding low attenuation in the right thalamus on recent CT. No associated hemorrhage. Left parietal calcified lesion with associated enhancement is consistent with a meningioma. Extensive pan paranasal sinus disease as described above with areas of low T2 signal consistent with inspissated secretions and/or fungal colonization. Restricted diffusion also noted within the right maxillary sinus which should be correlated clinically for the presence of acute sinusitis.  -5/4/24 CTA head and neck: Redemonstration  of lacunar infarct in the right thalamus that is favored to be acute or subacute. No acute hemorrhage. Chronic microangiopathy. Stable left posterior frontal/parietal calcified meningioma measuring 2.5 cm with localized mass effect. No edema. Severe calcified plaque at the left carotid bifurcation. Degree of stenosis is difficult to measure but left ICA is diffusely smaller than the right likely due to flow-limiting critical stenosis. No significant stenosis of the cervical right carotid or vertebral arteries. No high-grade intracranial stenosis, large vessel occlusion or aneurysm. Ectatic ascending thoracic aorta. Recommendation is for follow-up low radiation dose chest CT in one year.  -5/4/24 CT head: Age-indeterminate right thalamic lacunar infarction. Large 2.5 cm extra-axial densely calcified mass with associated hyperostosis lateral to the left frontal lobe most likely a large meningioma with mild local mass effect. No subfalcine herniation or significant surrounding edema. Mild microangiopathy. Extensive sinus disease with hyperdense elements possibly on the basis of fungal colonization or inspissated secretions. An obstructing mass including sinonasal polyposis or other etiologies not excluded. Left frontal scalp abnormality with peripheral calcifications possibly sebaceous cyst. No acute intracranial hemorrhage.    Plan:  -Right thalamic infarct, likely secondary to small vessel disease per neuro  -2.5 cm calcificed meningioma w/ mild local mass effect lateral to the L frontal lobe  -Neurology following; input appreciated  -CTA head and neck showed severe calcified plaque at the left carotid bifurcation w/ flow-limiting critical stenosis  -Order carotid artery duplex for further eval of ICA stenosis  -No immediate vascular intervention required  -Continue ASA, plavix and lipitor per neuro recommendations for dual purpose of recent stroke and ICA stenosis  -Will review carotid duplex results when available  "and make further recommendations  -Will discuss w/ Dr. Renee      Consulting Service: SLIM    Chief Complaint: Carotid artery stenosis    HPI: Sanam Castro is a 68 y.o. female w/ a hx of HTN, hx of SAH secondary to fall in 2022, anxiety, ovarian cyst and recurrent sinusitis who presented to [facility] on 5/4/24 w/ gait instability x1 day. In ED, there was also concern for L facial droop and pt was admitted on stroke pathway. Pt was outside the window for TNK and was given ASA and plavix. CT head showed an age-indeterminate right thalamic infarct and 2.5 cm extra-axial densely calcified mass with associated hyperostosis lateral to the left frontal lobe most likely a large meningioma with mild local mass effect. No subfalcine herniation or significant surrounding edema. CTA showed L ICA critical stenosis. Pt was seen in consult by neurology who suspects R thalamic infarct is likely secondary to small vessel disease. Per neurology; continue DAPT x21 days, then continue ASA only.     Vascular surgery consulted for ICA stenosis seen on CTA. CTA head and neck revealed severe calcified plaque at the left carotid bifurcation w/ flow-limiting critical stenosis. Pt has no personal hx of prior vascular or cardiac intervention. She denies any hx of unilateral weakness, difficulty speaking, or visual changes. However, she does report her paternal grandmother had a stroke and her father had numerous TIAs during his lifetime. Pt states she was feeling fine until 5/3 when she was diagnosed by her dentist w/ an ulcer under her R lower gum. She subsequently became agitated w/ her insurance company during an attempt to find an in-network periodontist, which per pt caused an elevated BP. While walking back from the bathroom, she became \"woozy\" w/ a \"disjointed feeling in her body\" which caused her to fall. Pt denies any loss of consciousness or head strike, stating she landed on her left shoulder. Today, she reports limited ROM in L " shoulder due to soreness, and otherwise, denies any further complaints at this time.       Review of Systems:  General: negative  Cardiovascular: no chest pain or dyspnea on exertion  Respiratory: no cough, shortness of breath, or wheezing  Gastrointestinal: no abdominal pain, change in bowel habits, or black or bloody stools  Genitourinary ROS: no dysuria, trouble voiding, or hematuria  Musculoskeletal ROS: L shoulder soreness  Neurological ROS: no residual TIA or stroke symptoms    Past Medical History:  Past Medical History:   Diagnosis Date    History of ovarian cyst     Melanoma of shoulder, right (HCC)        Past Surgical History:  Past Surgical History:   Procedure Laterality Date    OVARIAN CYST REMOVAL Left        Social History:  Social History     Substance and Sexual Activity   Alcohol Use Yes    Alcohol/week: 2.0 standard drinks of alcohol    Types: 1 Cans of beer, 1 Glasses of wine per week     Social History     Substance and Sexual Activity   Drug Use Not Currently     Social History     Tobacco Use   Smoking Status Former    Current packs/day: 0.00    Average packs/day: 0.5 packs/day for 31.7 years (15.8 ttl pk-yrs)    Types: Cigarettes    Start date:     Quit date: 2016    Years since quittin.6   Smokeless Tobacco Never       Family History:  Family History   Problem Relation Age of Onset    No Known Problems Mother     Diabetes Father     Heart murmur Father     No Known Problems Son     No Known Problems Son        Allergies:  No Known Allergies    Medications:  Current Facility-Administered Medications   Medication Dose Route Frequency    acetaminophen (TYLENOL) tablet 650 mg  650 mg Oral Q6H PRN    aspirin chewable tablet 81 mg  81 mg Oral Daily    atorvastatin (LIPITOR) tablet 40 mg  40 mg Oral QPM    clopidogrel (PLAVIX) tablet 75 mg  75 mg Oral Daily    fluticasone (FLONASE) 50 mcg/act nasal spray 2 spray  2 spray Nasal Daily    heparin (porcine) subcutaneous injection 5,000  Units  5,000 Units Subcutaneous Q8H [facility]    hydrALAZINE (APRESOLINE) injection 10 mg  10 mg Intravenous Q4H PRN    lidocaine (LIDODERM) 5 % patch 1 patch  1 patch Topical Daily    oxyCODONE (ROXICODONE) split tablet 2.5 mg  2.5 mg Oral Q6H PRN       Vitals:  BP (!) 176/103 (05/06/24 1111)    Temp 97.6 °F (36.4 °C) (05/06/24 1111)    Pulse 82 (05/06/24 1111)   Resp 20 (05/06/24 1111)    SpO2 97 % (05/06/24 1111)      I/Os:  I/O last 3 completed shifts:  In: 480 [P.O.:480]  Out: 650 [Urine:650]  I/O this shift:  In: 120 [P.O.:120]  Out: -     Lab Results and Cultures:   CBC with diff:   Lab Results   Component Value Date    WBC 5.58 05/06/2024    HGB 14.3 05/06/2024    HCT 43.5 05/06/2024    MCV 91 05/06/2024     05/06/2024    RBC 4.76 05/06/2024    MCH 30.0 05/06/2024    MCHC 32.9 05/06/2024    RDW 13.1 05/06/2024    MPV 10.4 05/06/2024    NRBC 0 05/04/2024     BMP/CMP:  Lab Results   Component Value Date    K 3.2 (L) 05/06/2024     05/06/2024    CO2 26 05/06/2024    BUN 15 05/06/2024    CREATININE 0.60 05/06/2024    CALCIUM 9.7 05/06/2024    AST 15 05/04/2024    ALT 12 05/04/2024    ALKPHOS 72 05/04/2024    EGFR 92 05/06/2024     Urinalysis:   Lab Results   Component Value Date    COLORU Yellow 05/04/2024    CLARITYU Clear 05/04/2024    SPECGRAV 1.020 05/04/2024    PHUR 5.5 05/04/2024    LEUKOCYTESUR Large (A) 05/04/2024    NITRITE Negative 05/04/2024    GLUCOSEU Negative 05/04/2024    KETONESU 15 (1+) (A) 05/04/2024    BILIRUBINUR Negative 05/04/2024    BLOODU Small (A) 05/04/2024     Urine Culture:   Lab Results   Component Value Date    URINECX >100,000 cfu/ml 05/04/2024       Imaging:  See above    Physical Exam:    General appearance: alert and oriented, in no acute distress  Head: Normocephalic, without obvious abnormality, atraumatic  Neck: supple, symmetrical, trachea midline and (+) L carotid bruit  Lungs: clear to auscultation bilaterally  Heart: regular rate and rhythm and S1, S2  normal  Abdomen:  soft, non-tender  Extremities: extremities normal, warm and well-perfused; no cyanosis, clubbing, or edema  Skin: Skin color, texture, turgor normal. No rashes or lesions  Neurologic: Grossly normal except for mild R tongue deviation      Pulse exam:  Radial: Right: 2+ Left:: 2+  DP: Right: 2+ Left: 2+    SACHA Oseguera  5/6/2024

## 2024-05-06 NOTE — PROGRESS NOTES
[facility]  Progress Note  Name: Sanam Castro I  MRN: [id number]  Unit/Bed#: E4 -01 I Date of Admission: 5/4/2024   Date of Service: 5/6/2024 I Hospital Day: 1    Assessment/Plan   * Stroke (cerebrum) (HCC)  Assessment & Plan  History of hypertension and meningioma presented to the hospital for left-sided weakness  Had a fall hitting left shoulder..  MRI with right thalamic infarct  Continue DAPT for 21 days  Not requiring inpatient rehab      Carotid artery stenosis  Assessment & Plan  Patient pending carotid artery ultrasound for possible outpatient repair given high-grade stenosis  Appreciate vascular surgical recommendations  Continue DAPT    Aortic ectasia (HCC)  Assessment & Plan  Fusiform ectasia of the ascending thoracic aorta measuring up to 40 mm.   Recommendation is for follow-up low radiation dose chest CT in one year.     Recurrent sinusitis  Assessment & Plan  Noted on ct head w/Extensive sinus disease with hyperdense elements possibly on the basis of fungal colonization or inspissated secretions. An obstructing mass including sinonasal polyposis or other etiologies not excluded   -Continue flonase  -Ambulatory to ent      Meningioma (HCC)  Assessment & Plan  Previously followed with [facility] neurosurgery  Given questionable mass effect, case discussed with neurosurgery and will plan for outpatient follow-up  Incidentally found, heavily calcified    Primary hypertension  Assessment & Plan  Not on medications prior to admission.    Start amlodipine 2.5 mg                   Hospital Course:     68-year-old female patient admitted with dizziness as well as fine motor deficit found to have right thalamic stroke.  Also with left internal carotid artery stenosis and known meningioma    Assessment:      Principal Problem:    Stroke (cerebrum) (HCC)  Active Problems:    Primary hypertension    Meningioma (HCC)    Recurrent sinusitis    Aortic ectasia (HCC)    Carotid artery  stenosis      Plan:    Continue stroke workup  Continue dual antiplatelet therapy  PT OT evaluations for possible rehab  Vascular surgery consultation, recommends outpatient carotid endarterectomy discussion       VTE Pharmacologic Prophylaxis:   Pharmacologic: Heparin  Mechanical VTE Prophylaxis in Place: Yes    AM-PAC Basic Mobility:  Basic Mobility Inpatient Raw Score: 22    JH-HLM Achieved: 7: Walk 25 feet or more  JH-HLM Goal: 7: Walk 25 feet or more    HLM Goal listed above. Continue with multidisciplinary rounding and encourage appropriate mobility to improve upon HLM goals.         Patient Centered Rounds: Case discussed and reviewed with nursing    Discussions with Specialists or Other Care Team Provider: Case management    Education and Discussions with Family / Patient: Discussed with patient son    Time Spent for Care: 80 minutes.  More than 50% of total time spent on counseling and coordination of care as described above.    Current Length of Stay: 1 day(s)    Current Patient Status: Inpatient   Certification Statement: The patient will continue to require additional inpatient hospital stay due to stroke evaluation, carotid ultrasound    Discharge Plan / Estimated Discharge Date: 24 to 48 hours    Code Status: Level 1 - Full Code      Subjective:   Seen and examined, no acute complaints.  No nausea no vomiting, no abdominal pain    A complete and comprehensive 14 point organ system review has been performed and all other systems are negative other than stated above.    Objective:     Vitals:   Temp (24hrs), Av.9 °F (36.6 °C), Min:97.3 °F (36.3 °C), Max:98.5 °F (36.9 °C)    Temp:  [97.3 °F (36.3 °C)-98.5 °F (36.9 °C)] 97.6 °F (36.4 °C)  HR:  [60-82] 82  Resp:  [18-20] 20  BP: (141-197)/() 176/103  SpO2:  [96 %-98 %] 97 %  Body mass index is 24.37 kg/m².     Input and Output Summary (last 24 hours):       Intake/Output Summary (Last 24 hours) at 2024 1510  Last data filed at 2024  1300  Gross per 24 hour   Intake 240 ml   Output 650 ml   Net -410 ml       Physical Exam:     General: well appearing, no acute distress  HEENT: atraumatic, PERRLA, moist mucosa, normal pharynx, normal tonsils and adenoids, normal tongue, no fluid in sinuses  Neck: Trachea midline, no carotid bruit, no masses  Respiratory: normal chest wall expansion, CTA B, no r/r/w, no rubs  Cardiovascular: RRR, no m/r/g, Normal S1 and S2  Abdomen: Soft, non-tender, non-distended, normal bowel sounds in all quadrants, no hepatosplenomegaly, no tympany  Rectal: deferred  Musculoskeletal: normal ROM in upper and lower extremities  Integumentary: warm, dry, and pink, with no rash, purpura, or petechia  Heme/Lymph: no lymphadenopathy, no bruises  Neurological: Cranial Nerves II-XII grossly intact  Psychiatric: cooperative with normal mood, affect, and cognition      Additional Data:     Labs:    Results from last 7 days   Lab Units 05/06/24  0445 05/04/24  1718   WBC Thousand/uL 5.58 7.22   HEMOGLOBIN g/dL 14.3 14.3   HEMATOCRIT % 43.5 43.5   PLATELETS Thousands/uL 255 266   SEGS PCT %  --  68   LYMPHO PCT %  --  16   MONO PCT %  --  11   EOS PCT %  --  4     Results from last 7 days   Lab Units 05/06/24  0445 05/04/24  1718   POTASSIUM mmol/L 3.2* 3.9   CHLORIDE mmol/L 106 105   CO2 mmol/L 26 29   BUN mg/dL 15 17   CREATININE mg/dL 0.60 0.61   CALCIUM mg/dL 9.7 10.2   ALK PHOS U/L  --  72   ALT U/L  --  12   AST U/L  --  15           * I Have Reviewed All Lab Data Listed Above.  * Additional Pertinent Lab Tests Reviewed: All Labs For Current Hospital Admission Reviewed    Imaging:    Imaging Reports Reviewed Today Include: Reviewed MRI of the brain  Imaging Personally Reviewed by Myself Includes: Reviewed MRI of the brain    Recent Cultures (last 7 days):     Results from last 7 days   Lab Units 05/04/24  1902   URINE CULTURE  >100,000 cfu/ml       Last 24 Hours Medication List:   Current Facility-Administered Medications    Medication Dose Route Frequency Provider Last Rate    acetaminophen  650 mg Oral Q6H PRN Livia Javier PA-C      amLODIPine  2.5 mg Oral Daily James Sams DO      aspirin  81 mg Oral Daily Livia Javier PA-C      atorvastatin  40 mg Oral QPM Livia Javier PA-C      clopidogrel  75 mg Oral Daily Livia Javier PA-C      fluticasone  2 spray Nasal Daily Livia Javier PA-C      heparin (porcine)  5,000 Units Subcutaneous Q8H SALLIE Livia Javier PA-C      hydrALAZINE  10 mg Intravenous Q4H PRN Clay Villalba DO      lidocaine  1 patch Topical Daily Livia Javier PA-C      oxyCODONE  2.5 mg Oral Q6H PRN Livia Javier PA-C         AM-PAC Basic Mobility:  Basic Mobility Inpatient Raw Score: 22    JH-HLM Achieved: 7: Walk 25 feet or more  JH-HLM Goal: 7: Walk 25 feet or more    HLM Goal listed above. Continue with multidisciplinary rounding and encourage appropriate mobility to improve upon HLM goals.     Today, Patient Was Seen By: James Sams DO    ** Please Note: This note was completed in part utilizing [employer] software dictation.  Grammatical errors, random word insertions, spelling mistakes, and incomplete sentences may be an occasional consequence of this system secondary to software limitations, ambient noise, and hardware issues.  If you have any questions or concerns about the content, text, or information contained within the body of this dictation, please contact the provider for clarification. **

## 2024-05-06 NOTE — OCCUPATIONAL THERAPY NOTE
Occupational Therapy Evaluation     Patient Name: Sanam Castro  Today's Date: 5/6/2024  Problem List  Principal Problem:    Stroke (cerebrum) (HCC)  Active Problems:    Primary hypertension    Meningioma (HCC)    Recurrent sinusitis    Aortic ectasia (HCC)    Carotid artery stenosis    Past Medical History  Past Medical History:   Diagnosis Date    History of ovarian cyst     Melanoma of shoulder, right (HCC)      Past Surgical History  Past Surgical History:   Procedure Laterality Date    OVARIAN CYST REMOVAL Left              05/06/24 1035   OT Last Visit   OT Visit Date 05/06/24   Note Type   Note type Evaluation   Pain Assessment   Pain Assessment Tool 0-10   Pain Score 7   Pain Location/Orientation Orientation: Left;Location: Shoulder   Patient's Stated Pain Goal No pain   Hospital Pain Intervention(s) Ambulation/increased activity;Emotional support;Rest   Restrictions/Precautions   Weight Bearing Precautions Per Order No   Other Precautions Bed Alarm;Fall Risk;Pain;Telemetry   Home Living   Type of Home Apartment  ([facility]- 10th floor)   Home Layout One level;Elevator   Bathroom Shower/Tub Tub/shower unit  (Cut out tub)   Bathroom Toilet Raised   Bathroom Equipment Grab bars in shower;Grab bars around toilet   Additional Comments Pt lives alone in a 10th floor apartment at [facility] with elevator access. Pt reports sister is flying in today to stay with her and assist as needed.   Prior Function   Level of [place] Independent with ADLs;Independent with functional mobility;Independent with IADLS   Lives With Alone   Receives Help From Family   IADLs Independent with driving;Independent with medication management;Independent with meal prep   Falls in the last 6 months 1 to 4  (1)   Vocational Retired   Comments PTA pt was I with ADLs, I with IADLs, I with functional transfers/mobility w/o use of AD. (+) . (+) falls.   Lifestyle   Autonomy PTA pt was I with ADLs, I with IADLs, I with  functional transfers/mobility w/o use of AD. (+) . (+) falls.   Reciprocal Relationships Son   Service to Others Retired   General   Family/Caregiver Present No   ADL   Where Assessed Edge of bed   Eating Assistance 7  Independent   Grooming Assistance 5  Supervision/Setup   UB Bathing Assistance 5  Supervision/Setup   LB Bathing Assistance 5  Supervision/Setup   UB Dressing Assistance 5  Supervision/Setup   LB Dressing Assistance 5  Supervision/Setup   Toileting Assistance  5  Supervision/Setup   Bed Mobility   Supine to Sit 6  Modified independent   Additional items HOB elevated;Increased time required   Sit to Supine Unable to assess   Additional Comments Pt with c/o pain in LUE with bed mobility.   Transfers   Sit to Stand 5  Supervision   Additional items Verbal cues   Stand to Sit 5  Supervision   Additional items Armrests;Verbal cues   Additional Comments Verbal cues for safe technique and hand placement.   Functional Mobility   Functional Mobility 5  Supervision   Additional Comments Pt reports fear of falling with functional mobility.   Additional items Rolling walker   Balance   Static Sitting Good   Dynamic Sitting Fair +   Static Standing Fair   Dynamic Standing Fair -   Ambulatory Fair -   Activity Tolerance   Activity Tolerance Patient tolerated treatment well;Other (Comment)  (Limited due to pt's fear of falling.)   Medical Staff Made Aware Ever RN; Brigid, PT   Nurse Made Aware yes   RUE Assessment   RUE Assessment WFL  (4+/5)   LUE Assessment   LUE Assessment X  (Pt with limited ROM on LUE due to pain ~20*. Pt denied PROM from therapist, however educated on importance of completing PROM/AAROM on LUE to prevent stiffness and loss of strength. Elbow to distal WFL. 4/5)   Hand Function   Gross Motor Coordination Functional   Fine Motor Coordination Functional   Sensation   Light Touch No apparent deficits   Proprioception   Proprioception No apparent deficits   Vision-Basic Assessment    Current Vision Wears glasses all the time   Vision - Complex Assessment   Ocular Range of Motion Intact   Acuity Able to read employee name badge without difficulty   Psychosocial   Psychosocial (WDL) WDL   Perception   Inattention/Neglect Appears intact   Cognition   Overall Cognitive Status WFL   Arousal/Participation Alert;Responsive;Cooperative;Arousable   Attention Within functional limits   Orientation Level Oriented to person;Oriented to place;Oriented to time   Memory Decreased recall of recent events;Decreased recall of precautions   Following Commands Follows one step commands without difficulty   Assessment   Prognosis Good   Assessment Pt 68 y.o. female presenting [facility] with a fall with L shoulder pain. CT head (+) Acute to subacute lacunar infarcts involving the right paramedian midbrain and thalamus with corresponding low attenuation in the right thalamus on recent CT. No associated hemorrhage. X-Ray L shoulder (-) no acute osseous abnormality. Pt with PMH recurrent sinusitis, Meningioma, HTN. Pt with active OT orders to assess functional status and D/C planning. Pt with activity orders for up and OOB as tolerated. Pt lives alone in a 10th floor apartment at [facility] with elevator access. Pt reports sister is flying in today to stay with her and assist as needed. PTA pt was I with ADLs, I with IADLs, I with functional transfers/mobility w/o use of AD. (+) . (+) falls. Upon evaluation pt currently functioning at a Supervision for UB ADLs, Supervision for LB ADLs, Supervision for toileting, Mod I for bed mobility, and Supervision for functional mobility/transfers with use of RW. Pt with the following performance deficits; decreased UE ROM, decreased balance, decreased activity tolerance, decreased safety awareness, increased pain, and fear of falling .  Pt also with the following personal factors; limited home support, difficulty performing ADLs, difficulty performing IADLs,  difficulty performing transfers/mobility, fall risk , and new use of AD for functional transfers/mobility. Despite above mentioned deficits, pt appears to be functioning near baseline and no acute OT needs observed at this time. Based on pt current functional status, pt would benefit from Level III (minimum resource intensity) at D/C. The patient's raw score on the AM-PAC Daily Activity Inpatient Short Form is 20. A raw score of greater than or equal to 19 suggests the patient may benefit from discharge to home. Please refer to the recommendation of the Occupational Therapist for safe discharge planning.  Will D/C OT, re-consult if change in functional status occurs. Recommend continued mobilization with hospital staff and restorative program to increase pt's strength and endurance.   Plan   OT Frequency Eval only   Discharge Recommendation   Rehab Resource Intensity Level, OT III (Minimum Resource Intensity)  (Outpatient PT)   AM-PAC Daily Activity Inpatient   Lower Body Dressing 3   Bathing 3   Toileting 3   Upper Body Dressing 4   Grooming 3   Eating 4   Daily Activity Raw Score 20   Daily Activity Standardized Score (Calc for Raw Score >=11) 42.03   AM-PAC Applied Cognition Inpatient   Following a Speech/Presentation 4   Understanding Ordinary Conversation 4   Taking Medications 4   Remembering Where Things Are Placed or Put Away 4   Remembering List of 4-5 Errands 4   Taking Care of Complicated Tasks 4   Applied Cognition Raw Score 24   Applied Cognition Standardized Score 62.21   End of Consult   Patient Position at End of Consult Bedside chair;All needs within reach     ELO Sherwood

## 2024-05-06 NOTE — PHYSICAL THERAPY NOTE
PHYSICAL THERAPY EVALUATION          Patient Name: Sanam Castro  Today's Date: 5/6/2024 05/06/24 1035   Note Type   Note type Evaluation   Pain Assessment   Pain Assessment Tool 0-10   Pain Score 7   Pain Location/Orientation Orientation: Left;Location: Shoulder   Patient's Stated Pain Goal No pain   Hospital Pain Intervention(s) Ambulation/increased activity;Emotional support;Rest   Restrictions/Precautions   Weight Bearing Precautions Per Order No   Other Precautions Bed Alarm;Fall Risk;Pain   Home Living   Type of Home Apartment  ([facility]- 10th floor)   Home Layout One level;Elevator   Bathroom Shower/Tub Tub/shower unit  (Cut out tub)   Bathroom Toilet Raised   Bathroom Equipment Grab bars in shower;Grab bars around toilet   Additional Comments Pt lives alone in a 10th floor apartment at [facility] with elevator access. Pt reports sister is flying in today to stay with her and assist as needed.   Prior Function   Level of [place] Independent with ADLs;Independent with functional mobility;Independent with IADLS   Lives With Alone   Receives Help From Family   IADLs Independent with driving;Independent with medication management;Independent with meal prep   Falls in the last 6 months 1 to 4  (1)   Vocational Retired   Comments PTA pt was I with ADLs, I with IADLs, I with functional transfers/mobility w/o use of AD. (+) . (+) falls.   Cognition   Overall Cognitive Status WFL   Attention Within functional limits   Orientation Level Oriented to person;Oriented to place;Oriented to time   Memory Decreased recall of recent events;Decreased recall of precautions   Following Commands Follows one step commands without difficulty   RUE Assessment   RUE Assessment WFL  (4+/5)   LUE Assessment   LUE Assessment X  (Pt with limited ROM on LUE due to pain ~20*. Pt denied PROM from therapist, however educated on importance of completing PROM/AAROM on LUE to prevent stiffness and loss of  strength.)   Vision-Basic Assessment   Current Vision Wears glasses all the time   Bed Mobility   Supine to Sit Unable to assess   Sit to Supine 6  Modified independent   Additional items HOB elevated;Increased time required   Additional Comments Pt with c/o pain in LUE with bed mobility.   Transfers   Sit to Stand 5  Supervision   Additional items Verbal cues   Stand to Sit 5  Supervision   Additional items Armrests;Verbal cues   Additional Comments Verbal cues for safe technique and hand placement.   Balance   Static Sitting Good   Dynamic Sitting Fair +   Static Standing Fair   Dynamic Standing Fair -   Ambulatory Fair -   Activity Tolerance   Activity Tolerance Patient tolerated treatment well;Other (Comment)  (Limited due to pt's fear of falling.)   Medical Staff Made Aware Ever, RN; Brigid, PT   Nurse Made Aware yes   Assessment   Prognosis Good   Problem List Decreased mobility;Impaired balance;Pain   Assessment Sanam Castro is a 68 y.o. female admitted to [facility] on 5/4/2024 for Stroke (cerebrum) (HCC). PT was consulted and pt was seen on 5/6/2024 for mobility assessment and d/c planning. Pt presents w medium fall risk, telemetry monitoring, acute pain L shoulder. At baseline is indep. Pt is currently functioning at a mod I- S for transfers, S for ambulation. Pt demonstrated ability to ambulate household distances. No significant improvement in balance w use of DME vs no AD. Self limited mobility secondary to fear of falling. Gait deviations secondary to pt guarded/ apprehensive. Sx likely to improve w continued mobility and confidence. Currently PT recommendations for DME include RW per patient request. Pt had tried the RW w OT and prefers use of RW > SPC at this time. The patient's AM-PAC Basic Mobility Inpatient Short Form Raw Score is 22. A Raw score of greater than 16 suggests the patient may benefit from discharge to home. Please also refer to the recommendation of the Physical Therapist for safe  discharge planning.   Barriers to Discharge None   Goals   Patient Goals not fall   STG Expiration Date 05/16/24   Short Term Goal #1 1).  Pt will perform bed mobility indep demonstrating appropriate technique 100% of the time in order to improve function.2)  Perform all transfers with Fred demonstrating safe and appropriate technique 100% of the time in order to improve ability to negotiate safely in home environment.3) Amb with least restrictive AD > 150'x1 with mod I in order to demonstrate ability to negotiate in home environment.4)  Improve overall strength and balance 1/2 grade in order to optimize ability to perform functional tasks and reduce fall risk.5) Increase activity tolerance to 45 minutes in order to improve endurance to functional tasks.6) PT for ongoing patient and family/caregiver education, DME needs and d/c planning in order to promote highest level of function in least restrictive environment.   Plan   Treatment/Interventions Functional transfer training;LE strengthening/ROM;Therapeutic exercise;Patient/family training;Equipment eval/education;Bed mobility;Compensatory technique education;Gait training;OT   PT Frequency 1-2x/wk   Discharge Recommendation   Rehab Resource Intensity Level, PT III (Minimum Resource Intensity)  (OPPT for shoulder pain, balance prn)   Equipment Recommended Walker  (per patient request)   Walker Package Recommended Wheeled walker   Change/add to Walker Package? No   AM-PAC Basic Mobility Inpatient   Turning in Flat Bed Without Bedrails 4   Lying on Back to Sitting on Edge of Flat Bed Without Bedrails 4   Moving Bed to Chair 4   Standing Up From Chair Using Arms 4   Walk in Room 3   Climb 3-5 Stairs With Railing 3   Basic Mobility Inpatient Raw Score 22   Basic Mobility Standardized Score 47.4   [facility] Highest Level Of Mobility   -HLM Goal 7: Walk 25 feet or more   -HLM Achieved 7: Walk 25 feet or more   End of Consult   Patient Position at End of Consult  Bedside chair;All needs within reach   History: co - morbidities including age, current experience including fall risk  Exam: impairments in systems including multiple body structures involved; musculoskeletal (strength), neuromuscular (balance, gait, transfers, sensation, coordination), cognition; am-pac, joint integrity  Clinical: stable/unpredictable  Complexity: moderate      Brigid Arellano, PT

## 2024-05-07 ENCOUNTER — APPOINTMENT (INPATIENT)
Dept: NON INVASIVE DIAGNOSTICS | Facility: HOSPITAL | Age: 69
DRG: 065 | End: 2024-05-07
Payer: COMMERCIAL

## 2024-05-07 VITALS
DIASTOLIC BLOOD PRESSURE: 86 MMHG | HEIGHT: 69 IN | BODY MASS INDEX: 24.44 KG/M2 | OXYGEN SATURATION: 97 % | HEART RATE: 67 BPM | TEMPERATURE: 97.3 F | SYSTOLIC BLOOD PRESSURE: 165 MMHG | RESPIRATION RATE: 18 BRPM | WEIGHT: 165 LBS

## 2024-05-07 PROBLEM — E78.5 HLD (HYPERLIPIDEMIA): Status: ACTIVE | Noted: 2024-05-07

## 2024-05-07 LAB
DME PARACHUTE DELIVERY DATE REQUESTED: NORMAL
DME PARACHUTE DELIVERY NOTE: NORMAL
DME PARACHUTE ITEM DESCRIPTION: NORMAL
DME PARACHUTE ORDER STATUS: NORMAL
DME PARACHUTE SUPPLIER NAME: NORMAL
DME PARACHUTE SUPPLIER PHONE: NORMAL

## 2024-05-07 PROCEDURE — 93880 EXTRACRANIAL BILAT STUDY: CPT

## 2024-05-07 PROCEDURE — 93880 EXTRACRANIAL BILAT STUDY: CPT | Performed by: SURGERY

## 2024-05-07 PROCEDURE — 99239 HOSP IP/OBS DSCHRG MGMT >30: CPT | Performed by: INTERNAL MEDICINE

## 2024-05-07 RX ORDER — ATORVASTATIN CALCIUM 40 MG/1
40 TABLET, FILM COATED ORAL EVERY EVENING
Qty: 30 TABLET | Refills: 0 | Status: SHIPPED | OUTPATIENT
Start: 2024-05-07 | End: 2024-06-06

## 2024-05-07 RX ORDER — ASPIRIN 81 MG/1
81 TABLET, CHEWABLE ORAL DAILY
Qty: 30 TABLET | Refills: 0 | Status: SHIPPED | OUTPATIENT
Start: 2024-05-08 | End: 2024-06-07

## 2024-05-07 RX ORDER — ASPIRIN 81 MG/1
81 TABLET, CHEWABLE ORAL DAILY
Qty: 30 TABLET | Refills: 0 | Status: SHIPPED | OUTPATIENT
Start: 2024-05-08 | End: 2024-05-07

## 2024-05-07 RX ORDER — CLOPIDOGREL BISULFATE 75 MG/1
75 TABLET ORAL DAILY
Qty: 21 TABLET | Refills: 0 | Status: SHIPPED | OUTPATIENT
Start: 2024-05-08 | End: 2024-05-07

## 2024-05-07 RX ORDER — ATORVASTATIN CALCIUM 40 MG/1
40 TABLET, FILM COATED ORAL EVERY EVENING
Qty: 30 TABLET | Refills: 0 | Status: SHIPPED | OUTPATIENT
Start: 2024-05-07 | End: 2024-05-07

## 2024-05-07 RX ORDER — AMLODIPINE BESYLATE AND BENAZEPRIL HYDROCHLORIDE 5; 10 MG/1; MG/1
1 CAPSULE ORAL DAILY
Qty: 30 CAPSULE | Refills: 0 | Status: SHIPPED | OUTPATIENT
Start: 2024-05-07 | End: 2024-06-06

## 2024-05-07 RX ORDER — CLOPIDOGREL BISULFATE 75 MG/1
75 TABLET ORAL DAILY
Qty: 21 TABLET | Refills: 0 | Status: SHIPPED | OUTPATIENT
Start: 2024-05-08 | End: 2024-05-29

## 2024-05-07 RX ADMIN — AMLODIPINE BESYLATE 2.5 MG: 2.5 TABLET ORAL at 08:18

## 2024-05-07 RX ADMIN — Medication 2.5 MG: at 01:54

## 2024-05-07 RX ADMIN — ASPIRIN 81 MG CHEWABLE TABLET 81 MG: 81 TABLET CHEWABLE at 08:18

## 2024-05-07 RX ADMIN — HEPARIN SODIUM 5000 UNITS: 5000 INJECTION INTRAVENOUS; SUBCUTANEOUS at 06:03

## 2024-05-07 RX ADMIN — LIDOCAINE 5% 1 PATCH: 700 PATCH TOPICAL at 08:18

## 2024-05-07 RX ADMIN — CLOPIDOGREL BISULFATE 75 MG: 75 TABLET ORAL at 08:18

## 2024-05-07 RX ADMIN — FLUTICASONE PROPIONATE 2 SPRAY: 50 SPRAY, METERED NASAL at 08:18

## 2024-05-07 NOTE — PLAN OF CARE
Problem: Potential for Falls  Goal: Patient will remain free of falls  Description: INTERVENTIONS:  - Educate patient/family on patient safety including physical limitations  - Instruct patient to call for assistance with activity   - Consult OT/PT to assist with strengthening/mobility   - Keep Call bell within reach  - Keep bed low and locked with side rails adjusted as appropriate  - Keep care items and personal belongings within reach  - Initiate and maintain comfort rounds  - Make Fall Risk Sign visible to staff  - Offer Toileting every 2 Hours, in advance of need  - Initiate/Maintain bed/chair alarm  - Obtain necessary fall risk management equipment: NA  - Apply yellow socks and bracelet for high fall risk patients  - Consider moving patient to room near nurses station  Outcome: Progressing     Problem: PAIN - ADULT  Goal: Verbalizes/displays adequate comfort level or baseline comfort level  Description: Interventions:  - Encourage patient to monitor pain and request assistance  - Assess pain using appropriate pain scale  - Administer analgesics based on type and severity of pain and evaluate response  - Implement non-pharmacological measures as appropriate and evaluate response  - Consider cultural and social influences on pain and pain management  - Notify physician/advanced practitioner if interventions unsuccessful or patient reports new pain  Outcome: Progressing     Problem: SAFETY ADULT  Goal: Patient will remain free of falls  Description: INTERVENTIONS:  - Educate patient/family on patient safety including physical limitations  - Instruct patient to call for assistance with activity   - Consult OT/PT to assist with strengthening/mobility   - Keep Call bell within reach  - Keep bed low and locked with side rails adjusted as appropriate  - Keep care items and personal belongings within reach  - Initiate and maintain comfort rounds  - Make Fall Risk Sign visible to staff  - Offer Toileting every 2  Hours, in advance of need  - Initiate/Maintain bed/chair alarm  - Obtain necessary fall risk management equipment: NA  - Apply yellow socks and bracelet for high fall risk patients  - Consider moving patient to room near nurses station  Outcome: Progressing  Goal: Maintain or return to baseline ADL function  Description: INTERVENTIONS:  -  Assess patient's ability to carry out ADLs; assess patient's baseline for ADL function and identify physical deficits which impact ability to perform ADLs (bathing, care of mouth/teeth, toileting, grooming, dressing, etc.)  - Assess/evaluate cause of self-care deficits   - Assess range of motion  - Assess patient's mobility; develop plan if impaired  - Assess patient's need for assistive devices and provide as appropriate  - Encourage maximum independence but intervene and supervise when necessary  - Involve family in performance of ADLs  - Assess for home care needs following discharge   - Consider OT consult to assist with ADL evaluation and planning for discharge  - Provide patient education as appropriate  Outcome: Progressing     Problem: DISCHARGE PLANNING  Goal: Discharge to home or other facility with appropriate resources  Description: INTERVENTIONS:  - Identify barriers to discharge w/patient and caregiver  - Arrange for needed discharge resources and transportation as appropriate  - Identify discharge learning needs (meds, wound care, etc.)  - Arrange for interpretive services to assist at discharge as needed  - Refer to Case Management Department for coordinating discharge planning if the patient needs post-hospital services based on physician/advanced practitioner order or complex needs related to functional status, cognitive ability, or social support system  Outcome: Progressing     Problem: Knowledge Deficit  Goal: Patient/family/caregiver demonstrates understanding of disease process, treatment plan, medications, and discharge instructions  Description: Complete  learning assessment and assess knowledge base.  Interventions:  - Provide teaching at level of understanding  - Provide teaching via preferred learning methods  Outcome: Progressing     Problem: CARDIOVASCULAR - ADULT  Goal: Maintains optimal cardiac output and hemodynamic stability  Description: INTERVENTIONS:  - Monitor I/O, vital signs and rhythm  - Monitor for S/S and trends of decreased cardiac output  - Administer and titrate ordered vasoactive medications to optimize hemodynamic stability  - Assess quality of pulses, skin color and temperature  - Assess for signs of decreased coronary artery perfusion  - Instruct patient to report change in severity of symptoms  Outcome: Progressing     Problem: RESPIRATORY - ADULT  Goal: Achieves optimal ventilation and oxygenation  Description: INTERVENTIONS:  - Assess for changes in respiratory status  - Assess for changes in mentation and behavior  - Position to facilitate oxygenation and minimize respiratory effort  - Oxygen administered by appropriate delivery if ordered  - Initiate smoking cessation education as indicated  - Encourage broncho-pulmonary hygiene including cough, deep breathe, Incentive Spirometry  - Assess the need for suctioning and aspirate as needed  - Assess and instruct to report SOB or any respiratory difficulty  - Respiratory Therapy support as indicated  Outcome: Progressing     Problem: HEMATOLOGIC - ADULT  Goal: Maintains hematologic stability  Description: INTERVENTIONS  - Assess for signs and symptoms of bleeding or hemorrhage  - Monitor labs  - Administer supportive blood products/factors as ordered and appropriate  Outcome: Progressing     Problem: MUSCULOSKELETAL - ADULT  Goal: Maintain or return mobility to safest level of function  Description: INTERVENTIONS:  - Assess patient's ability to carry out ADLs; assess patient's baseline for ADL function and identify physical deficits which impact ability to perform ADLs (bathing, care of  mouth/teeth, toileting, grooming, dressing, etc.)  - Assess/evaluate cause of self-care deficits   - Assess range of motion  - Assess patient's mobility  - Assess patient's need for assistive devices and provide as appropriate  - Encourage maximum independence but intervene and supervise when necessary  - Involve family in performance of ADLs  - Assess for home care needs following discharge   - Consider OT consult to assist with ADL evaluation and planning for discharge  - Provide patient education as appropriate  Outcome: Progressing     Problem: Neurological Deficit  Goal: Neurological status is stable or improving  Description: Interventions:  - Monitor and assess patient's level of consciousness, motor function, sensory function, and level of assistance needed for ADLs.   - Monitor and report changes from baseline. Collaborate with interdisciplinary team to initiate plan and implement interventions as ordered.   - Provide and maintain a safe environment.  - Consider seizure precautions.  - Consider fall precautions.  - Consider aspiration precautions.  - Consider bleeding precautions.  Outcome: Progressing     Problem: Activity Intolerance/Impaired Mobility  Goal: Mobility/activity is maintained at optimum level for patient  Description: Interventions:  - Assess and monitor patient  barriers to mobility and need for assistive/adaptive devices.  - Assess patient's emotional response to limitations.  - Collaborate with interdisciplinary team and initiate plans and interventions as ordered.  - Encourage independent activity per ability.  - Maintain proper body alignment.  - Perform active/passive rom as tolerated/ordered.  - Plan activities to conserve energy.  - Turn patient as appropriate  Outcome: Progressing

## 2024-05-07 NOTE — DISCHARGE INSTR - AVS FIRST PAGE
Dear Sanam Castro,     It was our pleasure to care for you here at [facility].  It is our hope that we were always able to exceed the expected standards for your care during your stay.  You were hospitalized due to ***.  You were cared for on the *** floor by James Sams DO with the [facility] who covers for your primary care physician (PCP), No primary care provider on file., while you were hospitalized.  If you have any questions or concerns related to this hospitalization, you may contact us at .  For follow up as well as any medication refills, we recommend that you follow up with your primary care physician.  A registered nurse will reach out to you by phone within a few days after your discharge to answer any additional questions that you may have after going home.  However, at this time we provide for you here, the most important instructions / recommendations at discharge:     Notable Medication Adjustments -                          Lipitor 40 mg which is a medication for your cholesterol                         Lotrel 5/10-which is a medication for your blood pressure        Continue  with Aspirin 81 mg and Plavix 75 mg daily x 21 days (last day 5/26/24), then stop Plavix and continue with ASA monotherapy     Testing Required after Discharge -   Outpatient physical therapy and Occupational Therapy  Important follow up information -   PCP follow-up in 1 week  Neurosurgery follow-up 4 weeks  Neurology follow-up follow-up in 4 to 6-week  Vascular surgery referral placed  ENT Follow up for sinusitis  Other Instructions -     Please review this entire after visit summary as additional general instructions including medication list, appointments, activity, diet, any pertinent wound care, and other additional recommendations from your care team that may be provided for you.      Sincerely,     James Sams DO and Nurse  Reji Martinez

## 2024-05-07 NOTE — ASSESSMENT & PLAN NOTE
History of hypertension and meningioma presented to the hospital for left-sided weakness  Had a fall hitting left shoulder..  MRI with right thalamic infarct  Outpatient physical therapy  Neurologic follow-up in 4 to 6 weeks  Continue DAPT with Aspirin 81 mg and Plavix 75 mg daily x 21 days (last day 5/26/24), then stop Plavix and continue with ASA monotherapy

## 2024-05-07 NOTE — CASE MANAGEMENT
Case Management Discharge Planning Note    Patient name Sanam Castro  Location East 4 /E4 -* MRN [id number]  : 1955 Date 2024       Current Admission Date: 2024  Current Admission Diagnosis:Stroke (cerebrum) (HCC)   Patient Active Problem List    Diagnosis Date Noted    HLD (hyperlipidemia) 2024    Carotid artery stenosis 2024    Aortic ectasia (HCC) 2024    Stroke (cerebrum) (HCC) 2024    Meningioma (HCC) 2024    Recurrent sinusitis 2024    Overweight with body mass index (BMI) of 26 to 26.9 in adult 2022    Primary hypertension 2022    Seasonal allergies 2022    Family history of diabetes mellitus (DM) 2022    Subarachnoid hemorrhage following injury, no loss of consciousness (HCC) 2022    Contusion of left side of back 2022      LOS (days): 2  Geometric Mean LOS (GMLOS) (days): 2.9  Days to GMLOS:0.8     OBJECTIVE:  Risk of Unplanned Readmission Score: 5.81         Current admission status: Inpatient   Preferred Pharmacy:   [id number] [address] [place], [place] [address]  Phone: [phone] Fax: [phone]    [facility] [place] [place] - [address]  [facility],  [address]  [facility],  [facility] [address]  Phone: [phone] Fax: [phone]    Primary Care Provider: No primary care provider on file.    Primary Insurance: [employer]  REP  Secondary Insurance:     DISCHARGE DETAILS:    Discharge planning discussed with:: Patient  Freedom of Choice: Yes  Comments - Freedom of Choice: Pt is discharging home    DME Referral Provided  Referral made for DME?: Yes  DME referral completed for the following items:: Walker  DME Supplier Name:: [facility]    Discharge Destination Plan:: Home (w/OP Follow Up)  Transport at Discharge : Automobile  Transfer Mode: Walker  Accompanied by: Friend    Additional Comments: CM consulted for  Walker. This was recommended by SLA Therapy Team. Pt is in agreement. Pt ordered via [employer] and delivered to Pt at bedside. No other needs identified at this time. CM consult addressed.

## 2024-05-07 NOTE — DISCHARGE SUMMARY
"[facility]  Discharge- Sanam Castro 1955, 68 y.o. female MRN: [id number]  Unit/Bed#: E4 -01 Encounter: [id number]  Primary Care Provider: No primary care provider on file.   Date and time admitted to hospital: 5/4/2024  4:53 PM      Admitting Provider:  Clay Villalba DO  Discharge Provider:  James Sams DO  Admission Date: 5/4/2024       Discharge Date: 05/07/24   LOS: 2  Primary Care Physician at Discharge: No primary care provider on file. None    HOSPITAL COURSE:  Sanam Castro is a 68 y.o. female who presented to the emergency room with a chief complaint of gait instability and discoordination for 24 hours.  She had initially had seen her dentist due to right lower jaw pain and then trialed NSAIDs.  She reported later in the day that she was having difficulty with walking in her living room.  She fell without loss of consciousness or head strike.  She was able to ambulate but felt like she was \"intoxicated\".  Her symptoms improved however she was having difficulty with operating her cellular device.  On arrival to the emergency room there was concern for possible left facial droop she was out of the window for TNK and loaded with Plavix and aspirin.    Urgent neurologic consultation was obtained.  And a head CT showed an age indeterminant right thalamic infarct as well as a 2.5 cm extra-axial densely calcified mass with associated hyperostosis lateral to the left frontal lobe consistent with previously known large meningioma.    CTA showed no large vessel occlusion although she did have evidence of left ICA flow-limiting critical stenosis.    The patient was evaluated in consultation by the neurology service, she underwent MRI which confirmed right thalamic stroke.  Given critical left ICA stenosis he was evaluated by vascular surgery with plans for outpatient carotid endarterectomy.  In terms of her meningioma, the case was discussed with neurosurgery who " agreed to see the patient as an outpatient and felt there was no urgent neurosurgical needs.    The patient will be discharged with plans to remain on dual antiplatelet therapy for 21 days followed by monotherapy with aspirin.  She was evaluated by physical therapy and Occupational Therapy and did not require inpatient rehabilitation.    Incidentally she was noted to have chronic sinusitis and will need outpatient follow-up with ENT.    At the time of discharge the patient was tolerating oral diet they were without acute complaint and they were medically cleared for discharge.  All questions were answered the patient's satisfaction and they were in agreement with the discharge plan.    DISCHARGE DIAGNOSES  * Stroke (cerebrum) (HCC)  Assessment & Plan  History of hypertension and meningioma presented to the hospital for left-sided weakness  Had a fall hitting left shoulder..  MRI with right thalamic infarct  Outpatient physical therapy  Neurologic follow-up in 4 to 6 weeks  Continue DAPT with Aspirin 81 mg and Plavix 75 mg daily x 21 days (last day 5/26/24), then stop Plavix and continue with ASA monotherapy     HLD (hyperlipidemia)  Assessment & Plan  Goal Lipitor 40 mg  Goal LDL of 70, current     Carotid artery stenosis  Assessment & Plan  Patient pending carotid artery ultrasound for possible outpatient repair given high-grade stenosis  Appreciate vascular surgical recommendations  Continue DAPT    Aortic ectasia (HCC)  Assessment & Plan  Fusiform ectasia of the ascending thoracic aorta measuring up to 40 mm.   Recommendation is for follow-up low radiation dose chest CT in one year.     Recurrent sinusitis  Assessment & Plan  Noted on ct head w/Extensive sinus disease with hyperdense elements possibly on the basis of fungal colonization or inspissated secretions. An obstructing mass including sinonasal polyposis or other etiologies not excluded   -Continue flonase  -Ambulatory to ent      Meningioma  (HCC)  Assessment & Plan  [facility]  Given questionable mass effect, case discussed with neurosurgery and will plan for outpatient follow-up  Incidentally found, heavily calcified    Primary hypertension  Assessment & Plan  Not on medications prior to admission.    Lotrel 5/10 mg      CONSULTING PROVIDERS   IP CONSULT TO NEUROLOGY  IP CONSULT TO NEUROLOGY  IP CONSULT TO CASE MANAGEMENT  IP CONSULT TO PHYSICAL MEDICINE REHAB  IP CONSULT TO VASCULAR SURGERY  IP CONSULT TO NEUROSURGERY  IP CONSULT TO CARDIOLOGY    PROCEDURES PERFORMED  * No surgery found *    RADIOLOGY RESULTS  MRI brain w wo contrast    Result Date: 5/6/2024  Impression: Acute to subacute lacunar infarcts involving the right paramedian midbrain and thalamus with corresponding low attenuation in the right thalamus on recent CT. No associated hemorrhage. Left parietal calcified lesion with associated enhancement is consistent with a meningioma. Extensive pan paranasal sinus disease as described above with areas of low T2 signal consistent with inspissated secretions and/or fungal colonization. Restricted diffusion also noted within the right maxillary sinus which should be correlated clinically  for the presence of acute sinusitis.     XR shoulder 2+ views LEFT    Result Date: 5/5/2024  Impression: No acute osseous abnormality.     CTA head and neck w wo contrast    Result Date: 5/5/2024  Impression: Redemonstration of lacunar infarct in the right thalamus that is favored to be acute or subacute. No acute hemorrhage. Chronic microangiopathy. Stable left posterior frontal/parietal calcified meningioma measuring 2.5 cm with localized mass effect. No edema. Severe calcified plaque at the left carotid bifurcation. Degree of stenosis is difficult to measure but left ICA is diffusely smaller than the right likely due to flow-limiting critical stenosis. Recommend vascular consult. No significant stenosis of the cervical right  carotid or vertebral arteries No high-grade intracranial stenosis, large vessel occlusion or aneurysm Ectatic ascending thoracic aorta. Recommendation is for follow-up low radiation dose chest CT in one year. Preliminary report provided by [employer].     CT head without contrast    Result Date: 5/4/2024  Impression: 1. Age-indeterminate right thalamic lacunar infarction. Given the history of recent falling, this may represent a recent or subacute infarction. Further clinical assessment advised. 2. Large 2.5 cm extra-axial densely calcified mass with associated hyperostosis lateral to the left frontal lobe most likely a large meningioma with mild local mass effect. No subfalcine herniation or significant surrounding edema. Follow-up contrast-enhanced MRI of the brain and nonemergent neurosurgical assessment suggested. 3. Mild microangiopathy. 4. Extensive sinus disease with hyperdense elements possibly on the basis of fungal colonization or inspissated secretions. An obstructing mass including sinonasal polyposis or other etiologies not excluded. Follow-up nonemergent ENT assessment recommended. 5. Left frontal scalp abnormality with peripheral calcifications possibly sebaceous cyst. Direct clinical assessment advised. 7. No acute intracranial hemorrhage.       LABS  Results from last 7 days   Lab Units 05/06/24  0445 05/04/24  1718   WBC Thousand/uL 5.58 7.22   HEMOGLOBIN g/dL 14.3 14.3   HEMATOCRIT % 43.5 43.5   MCV fL 91 91   PLATELETS Thousands/uL 255 266     Results from last 7 days   Lab Units 05/06/24  0445 05/04/24  1718   SODIUM mmol/L 142 140   POTASSIUM mmol/L 3.2* 3.9   CHLORIDE mmol/L 106 105   CO2 mmol/L 26 29   BUN mg/dL 15 17   CREATININE mg/dL 0.60 0.61   CALCIUM mg/dL 9.7 10.2   ALBUMIN g/dL  --  4.4   TOTAL BILIRUBIN mg/dL  --  0.74   ALK PHOS U/L  --  72   ALT U/L  --  12   AST U/L  --  15   EGFR ml/min/1.73sq m 92 92   GLUCOSE RANDOM mg/dL 110 113                      Results from last 7  "days   Lab Units 05/05/24  0507   HEMOGLOBIN A1C % 5.7*             Results from last 7 days   Lab Units 05/05/24  0506   TRIGLYCERIDES mg/dL 221*   CHOLESTEROL mg/dL 222*   LDL CALC mg/dL 129*   HDL mg/dL 49*       Cultures:   Results from last 7 days   Lab Units 05/04/24  1902   COLOR UA  Yellow   CLARITY UA  Clear   SPEC GRAV UA  1.020   PH UA  5.5   LEUKOCYTES UA  Large*   NITRITE UA  Negative   GLUCOSE UA mg/dl Negative   KETONES UA mg/dl 15 (1+)*   BILIRUBIN UA  Negative   BLOOD UA  Small*      Results from last 7 days   Lab Units 05/04/24  1902   RBC UA /hpf 4-10*   WBC UA /hpf 10-20*   EPITHELIAL CELLS WET PREP /hpf Occasional   BACTERIA UA /hpf Occasional      Results from last 7 days   Lab Units 05/04/24  1902   URINE CULTURE  >100,000 cfu/ml             PHYSICAL EXAM:  Vitals:   Blood Pressure: 165/86 (05/07/24 1116)  Pulse: 67 (05/07/24 1116)  Temperature: (!) 97.3 °F (36.3 °C) (05/07/24 1116)  Temp Source: Temporal (05/07/24 1116)  Respirations: 18 (05/07/24 1116)  Height: 5' 9\" (175.3 cm) (05/05/24 1632)  Weight - Scale: 74.8 kg (165 lb) (05/05/24 1632)  SpO2: 97 % (05/07/24 1116)      General: well appearing, no acute distress  HEENT: atraumatic, PERRLA, moist mucosa, normal pharynx, normal tonsils and adenoids, normal tongue, no fluid in sinuses  Neck: Trachea midline, no carotid bruit, no masses  Respiratory: normal chest wall expansion, CTA B  Cardiovascular: RRR, no m/r/g, Normal S1 and S2  Abdomen: Soft, non-tender, non-distended, normal bowel sounds in all quadrants, no hepatosplenomegaly, no tympany  Rectal: deferred  Musculoskeletal: Moves all  Integumentary: warm, dry, and pink, with no visible rash, purpura, or petechia  Heme/Lymph: no lymphadenopathy, no bruises  Neurological: Cranial Nerves II-XII grossly intact  Psychiatric: cooperative with normal mood, affect, and cognition       Discharge Disposition: Home    AM-PAC Basic Mobility:  Basic Mobility Inpatient Raw Score: 22    [id number] " Achieved: 4: Move to chair/commode  -HL Goal: 7: Walk 25 feet or more    HLM Goal listed above. Continue with ongoing physical therapy and encourage appropriate mobility to improve upon HLM goals.      Test Results Pending at Discharge:           Medications   Summary of Medication Adjustments made as a result of this hospitalization: See discharge summary and AVS for medication changes  Medication Dosing Tapers - Please refer to Discharge Medication List for details on any medication dosing tapers (if applicable to patient).  Discharge Medication List: See after visit summary for reconciled discharge medications.     Diet restrictions:         Diet Orders   (From admission, onward)                 Start     Ordered    05/04/24 2030  Diet Regular; Regular House  Diet effective now        References:    Adult Nutrition Support Algorithm    RD Therapeutic Diet Order Protocol   Question Answer Comment   Diet Type Regular    Regular Regular House    RD to adjust diet per protocol? Yes        05/04/24 2029                  Activity restrictions: No strenuous activity  Discharge Condition: fair    Outpatient Follow-Up and Discharge Instructions  See after visit summary section titled Discharge Instructions for information provided to patient and family.      Code Status: Level 1 - Full Code  Discharge Statement   I spent 86 minutes discharging the patient. This time was spent on the day of discharge. Greater than 50% of total time was spent with the patient and / or family counseling and / or coordination of care.    ** Please Note: This note was completed in part utilizing [employer] Dragon Medical One Software.  Grammatical errors, random word insertions, spelling mistakes, and incomplete sentences may be an occasional consequence of this system secondary to software limitations, ambient noise, and hardware issues.  If you have any questions or concerns about the content, text, or information contained within the body of  this dictation, please contact the provider for clarification.**

## 2024-05-08 ENCOUNTER — TELEPHONE (OUTPATIENT)
Dept: NEUROLOGY | Facility: CLINIC | Age: 69
End: 2024-05-08

## 2024-05-08 ENCOUNTER — TELEPHONE (OUTPATIENT)
Age: 69
End: 2024-05-08

## 2024-05-08 NOTE — TELEPHONE ENCOUNTER
[facility]/ [facility]/ Stroke    DC-Home-5/7/24    Notes:  Sanam MORGAN [place] will need follow up in in 6 weeks with neurovascular attending  .  She will not require outpatient neurological testing.        Scheduled:  7/9/24 [place] 10am ALL    Pt prefers [place] location due to this is the office insurance takes.

## 2024-05-08 NOTE — UTILIZATION REVIEW
NOTIFICATION OF ADMISSION DISCHARGE   This is a Notification of Discharge from [facility]. Please be advised that this patient has been discharge from our facility. Below you will find the admission and discharge date and time including the patient’s disposition.   UTILIZATION REVIEW CONTACT:  Adelaida Rivera  Utilization   Network Utilization Review Department  Phone: [phone] x carefully listen to the prompts. All voicemails are confidential.  Email: [email or web address]     ADMISSION INFORMATION  PRESENTATION DATE: 5/4/2024  4:53 PM  OBERVATION ADMISSION DATE:   INPATIENT ADMISSION DATE: 5/5/24 12:54 PM   DISCHARGE DATE: 5/7/2024  3:52 PM   DISPOSITION:Home/Self Care    Network Utilization Review Department  ATTENTION: Please call with any questions or concerns to [phone] and carefully listen to the prompts so that you are directed to the right person. All voicemails are confidential.   For Discharge needs, contact Care Management DC Support Team at [phone] opt. [phone]  Send all requests for admission clinical reviews, approved or denied determinations and any other requests to dedicated fax number below belonging to the campus where the patient is receiving treatment. List of dedicated fax numbers for the Facilities:  FACILITY NAME UR FAX NUMBER   ADMISSION DENIALS (Administrative/Medical Necessity) [phone]   DISCHARGE SUPPORT TEAM ([facility]) [phone]   PARENT CHILD HEALTH (Maternity/NICU/Pediatrics) [phone]   [facility] [phone]   [facility] [phone]   [facility] [phone]   [facility] [phone]   [facility] [phone]   [facility] [phone]   [facility] [phone]   [facility] [phone]   [facility]  [facility] [phone]   [facility] [phone]   [facility] [phone]   [facility] [phone]

## 2024-05-08 NOTE — TELEPHONE ENCOUNTER
05/08/2024- CALLED AND SPOKE WITH PT AND PT'S SISTER ABOUT SCHEDULING A F/U. PT STATED THAT SHE CAN'T DRIVE AT THE MOMENT BUT WOULD LIKE TO SCHEDULE A F/U. PT'S SISTER SPOKE WITH PT'S DAUGHTER IN LAW WHO IS AVAILABLE TO BRING HER TO A F/U APT ON 05/29/2024. PT'S RECORDS WERE REQUESTED TODAY FROM [facility] FOR [facility] C[facility].   05/29/2024- F/U for presumed meningioma    [facility]  Offer patient follow-up for presumed meningioma.  Request prior records from out side hospital - CT head 2022 if able.

## 2024-05-13 ENCOUNTER — TELEPHONE (OUTPATIENT)
Dept: NEUROLOGY | Facility: CLINIC | Age: 69
End: 2024-05-13

## 2024-05-13 NOTE — TELEPHONE ENCOUNTER
05/08/2024- CALLED AND SPOKE WITH PT AND PT'S SISTER ABOUT SCHEDULING A F/U. PT STATED THAT SHE CAN'T DRIVE AT THE MOMENT BUT WOULD LIKE TO SCHEDULE A F/U. PT'S SISTER SPOKE WITH PT'S DAUGHTER IN LAW WHO IS AVAILABLE TO BRING HER TO A F/U APT ON 05/29/2024. PT'S RECORDS WERE REQUESTED TODAY FROM [facility] FOR THE 2022 CTH.     05/29/2024- F/U for presumed meningioma     [facility]  Offer patient follow-up for presumed meningioma.  Request prior records from out side hospital - CT head 2022 if able.

## 2024-05-13 NOTE — TELEPHONE ENCOUNTER
Post CVA Discharge Follow Up  Hospitalization: 5/4/24-5/7/24    Called patient. Spoke with both the patient and her son, Bhupendra. Patient reports she is doing okay. Since discharge, she denies experiencing any new or worsening stroke-like symptoms.     Patient uses a walker for mobilization and requires some assistance with ADLs. Her family helps provide assistance.d    Reviewed appointments - patient reports she is scheduled to see a new PCP soon. Scheduled for the following: vascular surgery on 5/23/24, neurosurgery on 5/29/24, neurology on 7/9/24.     Patient is also scheduled for PT on 5/22/24 and OT on 5/30/24.    Reviewed stroke-related medications with her. Reports she is taking as prescribed with no medication side effects or signs of bleeding. She also verbalizes understanding of DAPT instructions/plan.     During this call, we reviewed stroke personal risk factors and management, medications.    As for risk factors, patient reports monitoring her BP twice daily and how it has been around 120/80.  She is a non smoker.  Encouraged patient to follow a well balanced diet.    All of their questions were addressed. At the conclusion of the conversation, they deny having any further questions or concerns.

## 2024-05-22 ENCOUNTER — EVALUATION (OUTPATIENT)
Facility: REHABILITATION | Age: 69
End: 2024-05-22
Payer: COMMERCIAL

## 2024-05-22 DIAGNOSIS — I63.9 STROKE (CEREBRUM) (HCC): Primary | ICD-10-CM

## 2024-05-22 DIAGNOSIS — I63.9 CEREBROVASCULAR ACCIDENT (CVA), UNSPECIFIED MECHANISM (HCC): ICD-10-CM

## 2024-05-22 PROCEDURE — 97110 THERAPEUTIC EXERCISES: CPT | Performed by: PHYSICAL THERAPIST

## 2024-05-22 PROCEDURE — 97162 PT EVAL MOD COMPLEX 30 MIN: CPT | Performed by: PHYSICAL THERAPIST

## 2024-05-22 NOTE — LETTER
"May 23, 2024    Dia Redman MD  [address]  [address]  [facility] [address]    Patient: Sanam Castro   YOB: 1955   Date of Visit: 2024     Encounter Diagnosis     ICD-10-CM    1. Stroke (cerebrum) (HCC)  I63.9 Ambulatory referral to Physical Therapy      2. Cerebrovascular accident (CVA), unspecified mechanism (HCC)  I63.9           Dear Dr. Redman:    Thank you for your recent referral of Sanam Castro. Please review the attached evaluation summary from Sanam's recent visit.     Please verify that you agree with the plan of care by signing the attached order.     If you have any questions or concerns, please do not hesitate to call.     I sincerely appreciate the opportunity to share in the care of one of your patients and hope to have another opportunity to work with you in the near future.       Sincerely,    Gerald Daniels, PT      Referring Provider:      I certify that I have read the below Plan of Care and certify the need for these services furnished under this plan of treatment while under my care.                    Dia Redman MD  [address]  [address]  [facility] [address]  Via Fax: [phone]          PHYSICAL THERAPY EVALUATION     Date: 24  Name: Sanam Castro  : 1955  Referring Provider: James Sams DO  AUTHORIZATION:   Insurance: Payor: [facility] REP / Plan: [employer] MEDICARE ADVANTRA / Product Type: Medicare HMO /     SUBJECTIVE:  HPI: Sanam Castro is a 68 y.o. female referred to outpatient physical therapy for the following diagnosis   Encounter Diagnosis   Name Primary?   • Stroke (cerebrum) (HCC) Yes       Hospital course 24:   \"HOSPITAL COURSE:  Sanam Castro is a 68 y.o. female who presented to the emergency room with a chief complaint of gait instability and discoordination for 24 hours.  She had initially had seen her dentist due to right lower jaw pain and then trialed NSAIDs.  She reported later in the " "day that she was having difficulty with walking in her living room.  She fell without loss of consciousness or head strike.  She was able to ambulate but felt like she was \"intoxicated\".  Her symptoms improved however she was having difficulty with operating her cellular device.  On arrival to the emergency room there was concern for possible left facial droop she was out of the window for TNK and loaded with Plavix and aspirin.     Urgent neurologic consultation was obtained.  And a head CT showed an age indeterminant right thalamic infarct as well as a 2.5 cm extra-axial densely calcified mass with associated hyperostosis lateral to the left frontal lobe consistent with previously known large meningioma.     CTA showed no large vessel occlusion although she did have evidence of left ICA flow-limiting critical stenosis.   The patient was evaluated in consultation by the neurology service, she underwent MRI which confirmed right thalamic stroke.  Given critical left ICA stenosis he was evaluated by vascular surgery with plans for outpatient carotid endarterectomy.  In terms of her meningioma, the case was discussed with neurosurgery who agreed to see the patient as an outpatient and felt there was no urgent neurosurgical needs.   The patient will be discharged with plans to remain on dual antiplatelet therapy for 21 days followed by monotherapy with aspirin.  She was evaluated by physical therapy and Occupational Therapy and did not require inpatient rehabilitation.   Incidentally she was noted to have chronic sinusitis and will need outpatient follow-up with ENT.   At the time of discharge the patient was tolerating oral diet they were without acute complaint and they were medically cleared for discharge.  All questions were answered the patient's satisfaction and they were in agreement with the discharge plan.\"    Patient reports since she's been home, someone's been with her, sister flew in from [place], " then her cousin has staying with her.   Has had meals prepared for her.  Fear is that stroke would happen again when by herself.  Currently limited in using the left hand, difficulty cutting meat with the left hand.  Is right-handed.    Making a cup of coffee is challenging, difficulty coordinating hand to face.    Has been home a week.  Difficulty using cellphone, difficulty texting.  Now able to answer and call from phone.    Using rolling walker at home.      [facility] in senior [facility], in bathroom has grab bars.  Elevator access, one floor apartment.   Normally lives alone.  Just moved into apartment in October 2023.   passed away 8 years ago.    Prior to stroke, would get to grocery store, drive (currently not driving).  No vision changes.  Has a tendency to talk in sleep, feels like it's worse now.  Now getting tired earlier in the night, sometimes restless through the night.    Currently not cleaning or paying bills herself.  Currently not going shopping or shopping online.      Will have carotid artery surgery, follows with vascular surgeon tomorrow.     Normally would use [place] 15-20 minutes when living with daughter in law's home.      Patient-Specific Functional Scale   Task is scored 0 (unable to perform activity) to 10 (ability to perform activity independently)  Activity     Get back to being active.     2.   Not being reliant on the walker.     3.   Return to driving.         Past Medical History:   Diagnosis Date   • History of ovarian cyst    • Melanoma of shoulder, right (HCC)        Current Outpatient Medications:   •  amLODIPine-benazepril (LOTREL 5-10) 5-10 MG per capsule, Take 1 capsule by mouth daily, Disp: 30 capsule, Rfl: 0  •  aspirin 81 mg chewable tablet, Chew 1 tablet (81 mg total) daily Do not start before May 8, 2024., Disp: 30 tablet, Rfl: 0  •  atorvastatin (LIPITOR) 40 mg tablet, Take 1 tablet (40 mg total) by mouth every evening, Disp: 30 tablet, Rfl: 0  •  Calcium  "Carb-Cholecalciferol (Caltrate 600+D3) 600-800 MG-UNIT TABS, Take 1 tablet by mouth in the morning, Disp: , Rfl:   •  clopidogrel (PLAVIX) 75 mg tablet, Take 1 tablet (75 mg total) by mouth daily for 21 days Do not start before May 8, 2024., Disp: 21 tablet, Rfl: 0  •  fluticasone (FLONASE) 50 mcg/act nasal spray, 2 sprays into each nostril daily, Disp: , Rfl:   •  Multiple Vitamins-Minerals (Centrum Silver 50+Women) TABS, Take 1 tablet by mouth daily, Disp: , Rfl:   Notes can take tylenol.  Had high blood pressure upon entering hospital.    OBJECTIVE:   Vitals     Blood pressure (resting, left arm unless noted) 108/70    Heart rate (resting) 69 bpm  99% SpO2       Oculomotor & Coordination     Smooth pursuit & conjugate gaze Normal    Fingertip to nose & rapidly alternating hand movements Discoordination with left alternating hand tapping  Full left shoulder elevation, full elbow extension, wrist and finger flexion and extension  Dysmetria left fingertip to nose     Roughly 4+/5 right shoulder abduction, elbow flexion and extension, wrist extension, 5/5 right  5/5 hip flexion, knee extension, dorsiflexion bilaterally  Denies paresthesias.     Static Balance     Romberg Normal, able to maintain balance but increased postural sway      Mobility Measures 5/22/24   5 Time Sit to Stand  (17\" chair, arms across chest) 14.7 seconds   3 Meter Timed  Up & Go 8.9 seconds   Walking speed (self-selected)    Functional Gait Assessment (see below) 12/30   6 Minute Walk Test (100 foot circular course) 1300 feet    Ending heart rate 90s bpm   Patient-Reported Outcome Measure: Activities-Specific Balance Confidence Scale (ABC Scale) 49%     Core Movement Tasks Description of task    Sitting Normal   Sitting to Standing Normal   Standing Normal   Walking Relatively even step lengths, mild increase in double limb support; moderate decrease in left arm swing, arm posturing in mild scaption with minimal elbow excursion   Step-up Using " railings, step-to gait   Reach, grasp, manipulate See above     ASSESSMENT:  Jaimie is a 68 year old female with mobility limitations following CVA 5/04/24.  She will follow with occupational therapy evaluation next week that will look into more detail about left upper extremity function, vision and visual processing as relates to dexterity, mobility and return to driving.    She was stable in static stance and with overground walking, however had large losses of balance with head movements.  She follows with vascular physician tomorrow.  We will practice a lot in our overhead harness system, without an assistive device, anticipate improvement to cane and then likely to mobility without an assistive device.  Continue using rolling walker at home.     Further referral needed: No    SHORT-TERM GOALS: by 6/05/24  1. Patient scores at least 16/30 on FGA.  2. Patient walks at least 1500 feet during 6 minute walk test.  3. Patient safely walks within the home with a cane.    LONG-TERM GOALS: by 7/22/24   1. Patient returns to prior level of community walking.  2. Patient scores at least 23/30 on FGA for safe mobility without assistive device.    Precautions - fall risk    Specialty Treatment Diary  5/22/24     Home exercise program Sit to stand   Walk in home with rollingwalker     (Ther-ex, neuromuscular re-ed)Walking/endurance      (Neuromuscular re-ed) Static and dynamic balance/anticipatory / reactive      (Ther-ex) Strengthening      (There-ex) Stretching              PLAN OF CARE:  Patient will benefit from physical therapy 2 times per week for 2 months  Neuromuscular re-education, therapeutic exercises, and therapeutic activities as outlined in grids.    Gerald Daniels, PT  5/22/2024

## 2024-05-22 NOTE — LETTER
"May 22, 2024      No Recipients    Patient: Sanam Castro   YOB: 1955   Date of Visit: 2024     Encounter Diagnosis     ICD-10-CM    1. Stroke (cerebrum) (HCC)  I63.9 Ambulatory referral to Physical Therapy      2. Cerebrovascular accident (CVA), unspecified mechanism (HCC)  I63.9           Dear Dr. Sams:    Thank you for your recent referral of Sanam Castro. Please review the attached evaluation summary from Sanam's recent visit.     Please verify that you agree with the plan of care by signing the attached order.     If you have any questions or concerns, please do not hesitate to call.     I sincerely appreciate the opportunity to share in the care of one of your patients and hope to have another opportunity to work with you in the near future.       Sincerely,    Gerald Daniels, PT      Referring Provider:      I certify that I have read the below Plan of Care and certify the need for these services furnished under this plan of treatment while under my care.                    James Sams DO  [address] [place] [address]  Via In Basket          PHYSICAL THERAPY EVALUATION     Date: 24  Name: Sanam Castro  : 1955  Referring Provider: James Sams DO  AUTHORIZATION:   Insurance: Payor: [facility] REP / Plan: [employer] MEDICARE ADVANTRA / Product Type: Medicare HMO /     SUBJECTIVE:  HPI: Sanam Castro is a 68 y.o. female referred to outpatient physical therapy for the following diagnosis   Encounter Diagnosis   Name Primary?   • Stroke (cerebrum) (HCC) Yes       Hospital course 24:   \"HOSPITAL COURSE:  Sanam Castro is a 68 y.o. female who presented to the emergency room with a chief complaint of gait instability and discoordination for 24 hours.  She had initially had seen her dentist due to right lower jaw pain and then trialed NSAIDs.  She reported later in the day that she was having difficulty with walking in her living room.  She " "fell without loss of consciousness or head strike.  She was able to ambulate but felt like she was \"intoxicated\".  Her symptoms improved however she was having difficulty with operating her cellular device.  On arrival to the emergency room there was concern for possible left facial droop she was out of the window for TNK and loaded with Plavix and aspirin.     Urgent neurologic consultation was obtained.  And a head CT showed an age indeterminant right thalamic infarct as well as a 2.5 cm extra-axial densely calcified mass with associated hyperostosis lateral to the left frontal lobe consistent with previously known large meningioma.     CTA showed no large vessel occlusion although she did have evidence of left ICA flow-limiting critical stenosis.   The patient was evaluated in consultation by the neurology service, she underwent MRI which confirmed right thalamic stroke.  Given critical left ICA stenosis he was evaluated by vascular surgery with plans for outpatient carotid endarterectomy.  In terms of her meningioma, the case was discussed with neurosurgery who agreed to see the patient as an outpatient and felt there was no urgent neurosurgical needs.   The patient will be discharged with plans to remain on dual antiplatelet therapy for 21 days followed by monotherapy with aspirin.  She was evaluated by physical therapy and Occupational Therapy and did not require inpatient rehabilitation.   Incidentally she was noted to have chronic sinusitis and will need outpatient follow-up with ENT.   At the time of discharge the patient was tolerating oral diet they were without acute complaint and they were medically cleared for discharge.  All questions were answered the patient's satisfaction and they were in agreement with the discharge plan.\"    Patient reports since she's been home, someone's been with her, sister flew in from [place], then her cousin has staying with her.   Has had meals prepared for her.  " Fear is that stroke would happen again when by herself.  Currently limited in using the left hand, difficulty cutting meat with the left hand.  Is right-handed.    Making a cup of coffee is challenging, difficulty coordinating hand to face.    Has been home a week.  Difficulty using cellphone, difficulty texting.  Now able to answer and call from phone.    Using rolling walker at home.      Lives in senior citizens building, in bathroom has grab bars.  Elevator access, one floor apartment.   Normally lives alone.  Just moved into apartment in October 2023.   passed away 8 years ago.    Prior to stroke, would get to grocery store, drive (currently not driving).  No vision changes.  Has a tendency to talk in sleep, feels like it's worse now.  Now getting tired earlier in the night, sometimes restless through the night.    Currently not cleaning or paying bills herself.  Currently not going shopping or shopping online.      Will have carotid artery surgery, follows with vascular surgeon tomorrow.     Normally would use [place] 15-20 minutes when living with daughter in law's home.      Patient-Specific Functional Scale   Task is scored 0 (unable to perform activity) to 10 (ability to perform activity independently)  Activity     Get back to being active.     2.   Not being reliant on the walker.     3.   Return to driving.         Past Medical History:   Diagnosis Date   • History of ovarian cyst    • Melanoma of shoulder, right (HCC)        Current Outpatient Medications:   •  amLODIPine-benazepril (LOTREL 5-10) 5-10 MG per capsule, Take 1 capsule by mouth daily, Disp: 30 capsule, Rfl: 0  •  aspirin 81 mg chewable tablet, Chew 1 tablet (81 mg total) daily Do not start before May 8, 2024., Disp: 30 tablet, Rfl: 0  •  atorvastatin (LIPITOR) 40 mg tablet, Take 1 tablet (40 mg total) by mouth every evening, Disp: 30 tablet, Rfl: 0  •  Calcium Carb-Cholecalciferol (Caltrate 600+D3) 600-800 MG-UNIT TABS, Take 1  "tablet by mouth in the morning, Disp: , Rfl:   •  clopidogrel (PLAVIX) 75 mg tablet, Take 1 tablet (75 mg total) by mouth daily for 21 days Do not start before May 8, 2024., Disp: 21 tablet, Rfl: 0  •  fluticasone (FLONASE) 50 mcg/act nasal spray, 2 sprays into each nostril daily, Disp: , Rfl:   •  Multiple Vitamins-Minerals (Centrum Silver 50+Women) TABS, Take 1 tablet by mouth daily, Disp: , Rfl:   Notes can take tylenol.  Had high blood pressure upon entering hospital.    OBJECTIVE:   Vitals     Blood pressure (resting, left arm unless noted) 108/70    Heart rate (resting) 69 bpm  99% SpO2       Oculomotor & Coordination     Smooth pursuit & conjugate gaze Normal    Fingertip to nose & rapidly alternating hand movements Discoordination with left alternating hand tapping  Full left shoulder elevation, full elbow extension, wrist and finger flexion and extension  Dysmetria left fingertip to nose     Roughly 4+/5 right shoulder abduction, elbow flexion and extension, wrist extension, 5/5 right  5/5 hip flexion, knee extension, dorsiflexion bilaterally  Denies paresthesias.     Static Balance     Romberg Normal, able to maintain balance but increased postural sway      Mobility Measures 5/22/24   5 Time Sit to Stand  (17\" chair, arms across chest) 14.7 seconds   3 Meter Timed  Up & Go 8.9 seconds   Walking speed (self-selected) *** meters/second   Functional Gait Assessment (see below) 12/30   6 Minute Walk Test (100 foot circular course) *** feet    Ending heart rate *** bpm   Patient-Reported Outcome Measure: {PATIENTREPORTEDOUTCOMEMEASURES:54943} ***     Functional Gait Assessment  {0-3 selection:68768}/3 Gait level surface  {0-3 selection:85260}/3 Change in gait speed  {0-3 selection:23831}/3 Gait with horizontal head turns  {0-3 selection:28907}/3 Gait with vertical head turns  {0-3 selection:19228}/3 Gait and pivot turn  {0-3 selection:34662}/3 Step over obstacle  {0-3 selection:44257}/3 Gait with narrow base " of support  {0-3 selection:51701}/3 Gait with eyes closed  {0-3 selection:01813}/3 Ambulating backwards  {0-3 selection:34702}/3 Steps  ***/30 Total score (less than 22/30 indicates increased risk of fall)..     Core Movement Tasks Description of task    Sitting {Core Movement Tasks - Sittin}   Sitting to Standing {Core Movement Tasks - Sitting to Standin}   Standing {Core Movement Tasks - Standin}   Walking {Core Movement Tasks - Walkin}   Step-up {[place] Movement Tasks - Step-up:61064}   Reach, grasp, manipulate {Core Movement Tasks - Reach, Grasp, Manipulate:54539}     ASSESSMENT:  ***    Further referral needed: {YES/NO:75766}    SHORT-TERM GOALS: by ***  1. Patient ***  2. Patient ***  3. Patient ***    LONG-TERM GOALS: by ***   1. Patient ***  2. Patient ***    Precautions ***    Specialty Treatment Diary  ***     Home exercise program ***     (Ther-ex, neuromuscular re-ed)Walking/endurance      (Neuromuscular re-ed) Static and dynamic balance/anticipatory / reactive      (Ther-ex) Strengthening      (There-ex) Stretching              PLAN OF CARE:  Patient will benefit from physical therapy *** times per week for *** months  Neuromuscular re-education, therapeutic exercises, and therapeutic activities as outlined in grids.    Gerald Daniels, PT  2024

## 2024-05-22 NOTE — PROGRESS NOTES
"PHYSICAL THERAPY EVALUATION     Date: 24  Name: Sanam Castro  : 1955  Referring Provider: James Sams DO  AUTHORIZATION:   Insurance: Payor: [employer]LIN  REP / Plan: [employer] MEDICARE ADVANTRA / Product Type: Medicare HMO /     SUBJECTIVE:  HPI: Sanam Castro is a 68 y.o. female referred to outpatient physical therapy for the following diagnosis   Encounter Diagnosis   Name Primary?    Stroke (cerebrum) (HCC) Yes       Hospital course 24:   \"HOSPITAL COURSE:  Sanam Castro is a 68 y.o. female who presented to the emergency room with a chief complaint of gait instability and discoordination for 24 hours.  She had initially had seen her dentist due to right lower jaw pain and then trialed NSAIDs.  She reported later in the day that she was having difficulty with walking in her living room.  She fell without loss of consciousness or head strike.  She was able to ambulate but felt like she was \"intoxicated\".  Her symptoms improved however she was having difficulty with operating her cellular device.  On arrival to the emergency room there was concern for possible left facial droop she was out of the window for TNK and loaded with Plavix and aspirin.     Urgent neurologic consultation was obtained.  And a head CT showed an age indeterminant right thalamic infarct as well as a 2.5 cm extra-axial densely calcified mass with associated hyperostosis lateral to the left frontal lobe consistent with previously known large meningioma.     CTA showed no large vessel occlusion although she did have evidence of left ICA flow-limiting critical stenosis.   The patient was evaluated in consultation by the neurology service, she underwent MRI which confirmed right thalamic stroke.  Given critical left ICA stenosis he was evaluated by vascular surgery with plans for outpatient carotid endarterectomy.  In terms of her meningioma, the case was discussed with neurosurgery who agreed to see the patient as an " "outpatient and felt there was no urgent neurosurgical needs.   The patient will be discharged with plans to remain on dual antiplatelet therapy for 21 days followed by monotherapy with aspirin.  She was evaluated by physical therapy and Occupational Therapy and did not require inpatient rehabilitation.   Incidentally she was noted to have chronic sinusitis and will need outpatient follow-up with ENT.   At the time of discharge the patient was tolerating oral diet they were without acute complaint and they were medically cleared for discharge.  All questions were answered the patient's satisfaction and they were in agreement with the discharge plan.\"    Patient reports since she's been home, someone's been with her, sister flew in from [place], then her cousin has staying with her.   Has had meals prepared for her.  Fear is that stroke would happen again when by herself.  Currently limited in using the left hand, difficulty cutting meat with the left hand.  Is right-handed.    Making a cup of coffee is challenging, difficulty coordinating hand to face.    Has been home a week.  Difficulty using cellphone, difficulty texting.  Now able to answer and call from phone.    Using rolling walker at home.      [facility], in bathroom has grab bars.  Elevator access, one floor apartment.   Normally lives alone.  Just moved into apartment in October 2023.   passed away 8 years ago.    Prior to stroke, would get to grocery store, drive (currently not driving).  No vision changes.  Has a tendency to talk in sleep, feels like it's worse now.  Now getting tired earlier in the night, sometimes restless through the night.    Currently not cleaning or paying bills herself.  Currently not going shopping or shopping online.      Will have carotid artery surgery, follows with vascular surgeon tomorrow.     Normally would use [place] 15-20 minutes when living with daughter in law's home.  "     Patient-Specific Functional Scale   Task is scored 0 (unable to perform activity) to 10 (ability to perform activity independently)  Activity     Get back to being active.     2.   Not being reliant on the walker.     3.   Return to driving.         Past Medical History:   Diagnosis Date    History of ovarian cyst     Melanoma of shoulder, right (HCC)        Current Outpatient Medications:     amLODIPine-benazepril (LOTREL 5-10) 5-10 MG per capsule, Take 1 capsule by mouth daily, Disp: 30 capsule, Rfl: 0    aspirin 81 mg chewable tablet, Chew 1 tablet (81 mg total) daily Do not start before May 8, 2024., Disp: 30 tablet, Rfl: 0    atorvastatin (LIPITOR) 40 mg tablet, Take 1 tablet (40 mg total) by mouth every evening, Disp: 30 tablet, Rfl: 0    Calcium Carb-Cholecalciferol (Caltrate 600+D3) 600-800 MG-UNIT TABS, Take 1 tablet by mouth in the morning, Disp: , Rfl:     clopidogrel (PLAVIX) 75 mg tablet, Take 1 tablet (75 mg total) by mouth daily for 21 days Do not start before May 8, 2024., Disp: 21 tablet, Rfl: 0    fluticasone (FLONASE) 50 mcg/act nasal spray, 2 sprays into each nostril daily, Disp: , Rfl:     Multiple Vitamins-Minerals (Centrum Silver 50+Women) TABS, Take 1 tablet by mouth daily, Disp: , Rfl:   Notes can take tylenol.  Had high blood pressure upon entering hospital.    OBJECTIVE:   Vitals     Blood pressure (resting, left arm unless noted) 108/70    Heart rate (resting) 69 bpm  99% SpO2       Oculomotor & Coordination     Smooth pursuit & conjugate gaze Normal    Fingertip to nose & rapidly alternating hand movements Discoordination with left alternating hand tapping  Full left shoulder elevation, full elbow extension, wrist and finger flexion and extension  Dysmetria left fingertip to nose     Roughly 4+/5 right shoulder abduction, elbow flexion and extension, wrist extension, 5/5 right  5/5 hip flexion, knee extension, dorsiflexion bilaterally  Denies paresthesias.     Static Balance      "Romberg Normal, able to maintain balance but increased postural sway      Mobility Measures 5/22/24   5 Time Sit to Stand  (17\" chair, arms across chest) 14.7 seconds   3 Meter Timed  Up & Go 8.9 seconds   Walking speed (self-selected)    Functional Gait Assessment (see below) 12/30   6 Minute Walk Test (100 foot circular course) 1300 feet    Ending heart rate 90s bpm   Patient-Reported Outcome Measure: Activities-Specific Balance Confidence Scale (ABC Scale) 49%     Core Movement Tasks Description of task    Sitting Normal   Sitting to Standing Normal   Standing Normal   Walking Relatively even step lengths, mild increase in double limb support; moderate decrease in left arm swing, arm posturing in mild scaption with minimal elbow excursion   Step-up Using railings, step-to gait   Reach, grasp, manipulate See above     ASSESSMENT:  Jaimie is a 68 year old female with mobility limitations following CVA 5/04/24.  She will follow with occupational therapy evaluation next week that will look into more detail about left upper extremity function, vision and visual processing as relates to dexterity, mobility and return to driving.    She was stable in static stance and with overground walking, however had large losses of balance with head movements.  She follows with vascular physician tomorrow.  We will practice a lot in our overhead harness system, without an assistive device, anticipate improvement to cane and then likely to mobility without an assistive device.  Continue using rolling walker at home.     Further referral needed: No    SHORT-TERM GOALS: by 6/05/24  1. Patient scores at least 16/30 on FGA.  2. Patient walks at least 1500 feet during 6 minute walk test.  3. Patient safely walks within the home with a cane.    LONG-TERM GOALS: by 7/22/24   1. Patient returns to prior level of community walking.  2. Patient scores at least 23/30 on FGA for safe mobility without assistive device.    Precautions - fall " risk    Specialty Treatment Diary  5/22/24     Home exercise program Sit to stand   Walk in home with rollingwalker     (Ther-ex, neuromuscular re-ed)Walking/endurance      (Neuromuscular re-ed) Static and dynamic balance/anticipatory / reactive      (Ther-ex) Strengthening      (There-ex) Stretching              PLAN OF CARE:  Patient will benefit from physical therapy 2 times per week for 2 months  Neuromuscular re-education, therapeutic exercises, and therapeutic activities as outlined in grids.    Gerald Daniels, PT  5/22/2024

## 2024-05-23 ENCOUNTER — OFFICE VISIT (OUTPATIENT)
Dept: VASCULAR SURGERY | Facility: CLINIC | Age: 69
End: 2024-05-23
Payer: COMMERCIAL

## 2024-05-23 VITALS
WEIGHT: 159 LBS | OXYGEN SATURATION: 93 % | SYSTOLIC BLOOD PRESSURE: 126 MMHG | BODY MASS INDEX: 23.55 KG/M2 | HEART RATE: 92 BPM | DIASTOLIC BLOOD PRESSURE: 80 MMHG | HEIGHT: 69 IN

## 2024-05-23 DIAGNOSIS — I65.29 CAROTID ARTERY STENOSIS: ICD-10-CM

## 2024-05-23 PROCEDURE — 99214 OFFICE O/P EST MOD 30 MIN: CPT | Performed by: SURGERY

## 2024-05-23 NOTE — PROGRESS NOTES
Ambulatory Visit  Name: Sanam Castro      : 1955      MRN: [id number]  Encounter Provider: Krish Galloway MD  Encounter Date: 2024   Encounter department: [facility]    Assessment & Plan   1. Carotid artery stenosis  Assessment & Plan:  68-year-old female with a past ministry of hypertension (poorly controlled), subarachnoid hemorrhage presented to the office after recent hospitalization for a right hemispheric stroke.  Patient had presented to the hospital with left-sided weakness and jumbled speech.  The patient had an MRI which showed a right hemispheric stroke within the thalamus and on CTA head and neck she was noted to have severe left carotid stenosis.  Since patient's admission, she has had residual left-sided weakness and has been going to physical therapy.  She has had a significant recovery though.    I reviewed the patient's imaging, duplex showed left prox. ICA   Ratio 8.46 which correlates with the CTA finding of severe left carotid stenosis.   Given the severity of the stenosis, it does meet criteria for repair for an asymptomatic lesion.    I discussed with the patient the association of carotid stenosis and stroke.  Her right hemispheric stroke is unrelated to her carotid disease however I do believe she would benefit from revascularization.  Given the extent of bulky plaque she would likely best be suited for a carotid endarterectomy.  Discussed the surgery as well as risks with the patient which include bleeding, infection, stroke, nerve injury, restenosis.  Patient understands and is interested in pursuing surgery.  At this time I am going to wait to operate while she is recovering from her stroke and regaining her strength.  Will plan to have the patient return to me in 3 months and at that time hopefully schedule her for surgery.  Orders:  -     Ambulatory Referral to Vascular Surgery      History of Present Illness       Patient is  "new to our office. Patient is here for a hospital f/u exam from admission on 5/4/2024 at [facility]. Patient is here to review results of a CV done 5/7/2024 and a CTA head and neck done 5/4/2024. Patient has continued left sided weakness. She denies any new TIA or Stroke symptoms. Patient is taking ASA 81 mg, Plavix and Atorvastatin. She is a former smoker.         Sanam Castro is a 68 y.o. female who presents with right hemispheric stroke on 5/4/24 and an asymptomatic left carotid stenosis    Review of Systems   Constitutional: Negative.    HENT: Negative.     Eyes: Negative.    Respiratory: Negative.     Cardiovascular: Negative.    Gastrointestinal: Negative.    Endocrine: Negative.    Genitourinary: Negative.    Musculoskeletal: Negative.    Skin: Negative.    Allergic/Immunologic: Positive for environmental allergies.   Neurological:  Positive for weakness (left side).   Hematological: Negative.    Psychiatric/Behavioral: Negative.       Medical History Reviewed by provider this encounter:  Tobacco  Allergies  Meds  Problems  Med Hx  Surg Hx  Fam Hx       Objective     /80 (BP Location: Left arm, Patient Position: Sitting, Cuff Size: Standard)   Pulse 92   Ht 5' 9\" (1.753 m)   Wt 72.1 kg (159 lb)   SpO2 93%   BMI 23.48 kg/m²     Physical Exam  Vitals and nursing note reviewed.   Constitutional:       General: She is not in acute distress.     Appearance: She is well-developed.   HENT:      Head: Normocephalic and atraumatic.   Eyes:      Conjunctiva/sclera: Conjunctivae normal.   Cardiovascular:      Rate and Rhythm: Normal rate and regular rhythm.      Pulses:           Carotid pulses are 2+ on the right side and 2+ on the left side.       Radial pulses are 2+ on the right side and 2+ on the left side.   Pulmonary:      Effort: Pulmonary effort is normal. No respiratory distress.      Breath sounds: Normal breath sounds.   Abdominal:      Palpations: Abdomen is soft.      Tenderness: There is " no abdominal tenderness.   Musculoskeletal:         General: No swelling.      Cervical back: Neck supple.   Skin:     General: Skin is warm and dry.      Capillary Refill: Capillary refill takes less than 2 seconds.   Neurological:      Mental Status: She is alert.   Psychiatric:         Mood and Affect: Mood normal.       Administrative Statements   I have spent a total time of 35 minutes on 05/23/24 In caring for this patient including Diagnostic results, Prognosis, Risks and benefits of tx options, Instructions for management, Patient and family education, Importance of tx compliance, Risk factor reductions, Impressions, Counseling / Coordination of care, Documenting in the medical record, Reviewing / ordering tests, medicine, procedures  , and Obtaining or reviewing history  .

## 2024-05-23 NOTE — ASSESSMENT & PLAN NOTE
68-year-old female with a past ministry of hypertension (poorly controlled), subarachnoid hemorrhage presented to the office after recent hospitalization for a right hemispheric stroke.  Patient had presented to the hospital with left-sided weakness and jumbled speech.  The patient had an MRI which showed a right hemispheric stroke within the thalamus and on CTA head and neck she was noted to have severe left carotid stenosis.  Since patient's admission, she has had residual left-sided weakness and has been going to physical therapy.  She has had a significant recovery though.    I reviewed the patient's imaging, duplex showed left prox. ICA   Ratio 8.46 which correlates with the CTA finding of severe left carotid stenosis.   Given the severity of the stenosis, it does meet criteria for repair for an asymptomatic lesion.    I discussed with the patient the association of carotid stenosis and stroke.  Her right hemispheric stroke is unrelated to her carotid disease however I do believe she would benefit from revascularization.  Given the extent of bulky plaque she would likely best be suited for a carotid endarterectomy.  Discussed the surgery as well as risks with the patient which include bleeding, infection, stroke, nerve injury, restenosis.  Patient understands and is interested in pursuing surgery.  At this time I am going to wait to operate while she is recovering from her stroke and regaining her strength.  Will plan to have the patient return to me in 3 months and at that time hopefully schedule her for surgery.

## 2024-05-28 ENCOUNTER — OFFICE VISIT (OUTPATIENT)
Facility: REHABILITATION | Age: 69
End: 2024-05-28
Payer: COMMERCIAL

## 2024-05-28 DIAGNOSIS — I63.9 CEREBROVASCULAR ACCIDENT (CVA), UNSPECIFIED MECHANISM (HCC): Primary | ICD-10-CM

## 2024-05-28 PROCEDURE — 97530 THERAPEUTIC ACTIVITIES: CPT | Performed by: PHYSICAL THERAPIST

## 2024-05-28 NOTE — PROGRESS NOTES
PHYSICAL THERAPY TREATMENT     Date: 24  Name: Sanam Castro  : 1955  Referring Provider: Dia Redman MD  AUTHORIZATION:   Insurance: Payor: [employer]LIN  REP / Plan: [employer] MEDICARE ADVANTRA / Product Type: Medicare HMO /     SUBJECTIVE:  HPI: Sanam Castro is a 68 y.o. female referred to outpatient physical therapy for the following diagnosis   Encounter Diagnosis   Name Primary?    Cerebrovascular accident (CVA), unspecified mechanism (HCC) Yes       Present with daughter-in-law Jannet.  Followed with vascular surgeon last week, they will allow healing and follow in 3 months.    Patient-Specific Functional Scale   Task is scored 0 (unable to perform activity) to 10 (ability to perform activity independently)  Activity     Get back to being active.     2.   Not being reliant on the walker.     3.   Return to driving.         OBJECTIVE:    Precautions - fall risk    Specialty Treatment Diary  24     Home exercise program /82 Sit to stand   Walk in home with rollingwalker     (Ther-ex, neuromuscular re-ed)Walking/endurance       (Neuromuscular re-ed) Static and dynamic balance/anticipatory / reactive Overground walking in solostep throughout  2 min no AD  100 feet as fast as possible   X 5 sets best 25 seconds  Then carry 5 lbs on bungee x 2-3 sets  Attempted pull 30 lbs, back pain, discontinued  Then 100 feet with head turns all planes about 5 sets  Then 360 degree turns 4 min  Then passing soccer ball 3.5 min  Then turning to catch bounced ball, 3.5 min   RPS 6/10  Blaze pod reactive balance 18' line in solostep 45 sec x 4 sets   Treadmill 0.5 - 0.8 mph 45 sec - 1 min x 4-5 sets      (Ther-ex) Strengthening       (There-ex) Stretching                   Vitals     Blood pressure (resting, left arm unless noted) 108/70    Heart rate (resting) 69 bpm  99% SpO2       Oculomotor & Coordination     Smooth pursuit & conjugate gaze Normal    Fingertip to nose & rapidly alternating  "hand movements Discoordination with left alternating hand tapping  Full left shoulder elevation, full elbow extension, wrist and finger flexion and extension  Dysmetria left fingertip to nose     Roughly 4+/5 right shoulder abduction, elbow flexion and extension, wrist extension, 5/5 right  5/5 hip flexion, knee extension, dorsiflexion bilaterally  Denies paresthesias.     Static Balance     Romberg Normal, able to maintain balance but increased postural sway      Mobility Measures 5/22/24   5 Time Sit to Stand  (17\" chair, arms across chest) 14.7 seconds   3 Meter Timed  Up & Go 8.9 seconds   Walking speed (self-selected)    Functional Gait Assessment (see below) 12/30   6 Minute Walk Test (100 foot circular course) 1300 feet    Ending heart rate 90s bpm   Patient-Reported Outcome Measure: Activities-Specific Balance Confidence Scale (ABC Scale) 49%     Core Movement Tasks Description of task    Sitting Normal   Sitting to Standing Normal   Standing Normal   Walking Relatively even step lengths, mild increase in double limb support; moderate decrease in left arm swing, arm posturing in mild scaption with minimal elbow excursion   Step-up Using railings, step-to gait   Reach, grasp, manipulate See above     Functional Gait Assessment  2/3 Gait level surface  2/3 Change in gait speed  2/3 Gait with horizontal head turns  2/3 Gait with vertical head turns  2/3 Gait and pivot turn  1/3 Step over obstacle  0/3 Gait with narrow base of support  2/3 Gait with eyes closed  2/3 Ambulating backwards  1/3 Steps - not tested inferred  16/30 Total score (less than 22/30 indicates increased risk of fall)..      ASSESSMENT:  Jaimie is a 68 year old female with mobility limitations following CVA 5/04/24.  She will follow with occupational therapy evaluation later this week that will look into more detail about left upper extremity function, vision and visual processing as relates to dexterity, mobility and return to driving.  "     Improved score on FGA, anticipate further improvement here.    We did a lot of practice without assistive device, use rolling walker at home.  Much less loss of balance with head turns.  Difficulty controlling momentum to stop and turn, work with blaze pods here.  We acclimated a bit to treadmill work to work towards cardiovascular targets, but challenged by balance tasks.  Comfortable walking much faster speeds overground versus when using treadmill.    SHORT-TERM GOALS: by 6/05/24  1. Patient scores at least 16/30 on FGA.  2. Patient walks at least 1500 feet during 6 minute walk test.  3. Patient safely walks within the home with a cane.    LONG-TERM GOALS: by 7/22/24   1. Patient returns to prior level of community walking.  2. Patient scores at least 23/30 on FGA for safe mobility without assistive device.    PLAN OF CARE:  Patient will benefit from physical therapy 2 times per week for 2 months  Neuromuscular re-education, therapeutic exercises, and therapeutic activities as outlined in grids.    Gerald Daniels, PT  5/28/2024

## 2024-05-29 ENCOUNTER — OFFICE VISIT (OUTPATIENT)
Age: 69
End: 2024-05-29
Payer: COMMERCIAL

## 2024-05-29 VITALS
WEIGHT: 156 LBS | SYSTOLIC BLOOD PRESSURE: 130 MMHG | OXYGEN SATURATION: 98 % | TEMPERATURE: 98 F | HEIGHT: 69 IN | HEART RATE: 71 BPM | BODY MASS INDEX: 23.11 KG/M2 | DIASTOLIC BLOOD PRESSURE: 88 MMHG

## 2024-05-29 DIAGNOSIS — D32.9 MENINGIOMA (HCC): Primary | ICD-10-CM

## 2024-05-29 DIAGNOSIS — I65.22 STENOSIS OF LEFT CAROTID ARTERY: ICD-10-CM

## 2024-05-29 DIAGNOSIS — S06.6X0A SUBARACHNOID HEMORRHAGE FOLLOWING INJURY, NO LOSS OF CONSCIOUSNESS, INITIAL ENCOUNTER (HCC): ICD-10-CM

## 2024-05-29 DIAGNOSIS — I63.9 CEREBROVASCULAR ACCIDENT (CVA), UNSPECIFIED MECHANISM (HCC): ICD-10-CM

## 2024-05-29 PROCEDURE — 99204 OFFICE O/P NEW MOD 45 MIN: CPT | Performed by: PHYSICIAN ASSISTANT

## 2024-05-29 NOTE — PROGRESS NOTES
Neurosurgery Office Note  Sanam Castro 68 y.o. female MRN: [id number]      Assessment & Plan     Meningioma (HCC)  Patient presents today as a new patient for evaluation of meningioma  Patient sustained a fall on May 4 and presented to the emergency room for evaluation of gait instability and discoordination.  Diagnosed with right thalamic infarct and severe left ICA stenosis  Incidental finding of a 2.5 cm left frontal partially calcified mass consistent with meningioma    Imaging personally reviewed:   CT head without 5/4/2024: 2.5 cm XR axial densely calcified mass with associated hyperostosis in the left frontal region most consistent with meningioma causing mild local mass effect.  MRI brain with and without 5/5/2024: Again demonstrated approximately 2.5 cm left frontoparietal partially calcified mass with associated enhancement consistent with meningioma.  Minimal mass effect.  No associated edema.    Plan:   Continue to monitor for any neurological symptoms  MRI brain imaging as well as recent CT head reviewed in detail with the patient and her family.  We also compared her CT to a CT scan completed at an outside hospital in 2022 findings similar sized calcified mass.  We reviewed this finding is most consistent with a calcified meningioma.  Natural history of meningioma reviewed.  We discussed given calcification this is likely and present for a prolonged duration and unlikely to change significantly or become symptomatic.  Nevertheless, this is a new diagnosis for this patient as she was not aware of this prior and no other MRI imaging available.  We will proceed with ongoing surveillance with a repeat MRI brain with and without contrast in 1 year.  Labs ordered to be completed prior to imaging.  Patient will have outpatient follow-up with [facility] after completion of MRI in 1 year.  She is aware to call the office with any questions concerns or new symptoms prior to this.      Stroke (cerebrum)  (HCC)  Continue aspirin, Plavix and statin per neurology  Blood pressure management with PCP  Outpatient neurology follow-up July 9, 2024    Carotid artery stenosis  Following with vascular surgery  Given degree of stenosis recommendation for CEA.  Plan for outpatient follow-up with vascular in August 2024 to allow patient to recover from her stroke prior to scheduling surgical intervention.       Diagnoses and all orders for this visit:    Meningioma (HCC)  -     MRI brain with and without contrast; Future  -     BUN/Creatinine Ratio; Future    Subarachnoid hemorrhage following injury, no loss of consciousness, initial encounter ([facility])    Cerebrovascular accident (CVA), unspecified mechanism (HCC)    Stenosis of left carotid artery          I have spent a total time of 46 minutes on 05/29/24 in caring for this patient including Diagnostic results, Prognosis, Instructions for management, Patient and family education, Impressions, Documenting in the medical record, Reviewing / ordering tests, medicine, procedures  , and Obtaining or reviewing history  .      CHIEF COMPLAINT    No chief complaint on file.      HISTORY    History of Present Illness     68 y.o. year old female     This is a 68-year-old female with past medical history significant for melanoma of right shoulder and recent right thalamic stroke as well as diagnosis of severe left ICA stenosis who presents today for evaluation of presumed meningioma.  Patient states approximately 2 and half weeks ago she was walking from the bathroom to her living room and felt off balance and about to fall.  She went to catch herself on the table but landed on her left side.  Given her difficulty with ambulation and sudden onset with discoordination she presented to the emergency room for evaluation.  Patient underwent stroke workup and was ultimately diagnosed with a right thalamic infarct.  She was seen by neurology started on aspirin 81 mg as well as Plavix 75 daily.   Patient was placed on high-dose statin and blood pressure medications.  During stroke workup, CT head and MRI imaging demonstrated 2.5 cm left-sided calcified mass consistent with meningioma without significant mass effect or presence of edema.     Of note, patient sustained a fall down steps in March 2022 when visiting her son in [place].  She was seen at a local hospital and underwent a CT head for which she was told did not show any findings.  She then was seen by a neurologist around that time through [facility] and did not require any additional imaging.  Patient states she was not told about a meningioma or calcified mass from the hospital in [place] where the CT scan was completed.  Retrospectively on personal review this mass was present in 2022.    Today, patient states she is overall doing well.  She is started physical therapy for ambulation.  She is currently using a roller walker but at times is able to ambulate in her apartment without an assistive device.  She denies any headaches, vision or speech changes at this time.  Denies any weakness or numbness.  Patient is overall without complaints.          REVIEW OF SYSTEMS    Review of Systems   Neurological:  Positive for weakness (left sided recieving PT).       ROS obtained by MA. Reviewed. See HPI.     Meds/Allergies     Current Outpatient Medications   Medication Sig Dispense Refill    amLODIPine-benazepril (LOTREL 5-10) 5-10 MG per capsule Take 1 capsule by mouth daily 30 capsule 0    aspirin 81 mg chewable tablet Chew 1 tablet (81 mg total) daily Do not start before May 8, 2024. 30 tablet 0    atorvastatin (LIPITOR) 40 mg tablet Take 1 tablet (40 mg total) by mouth every evening 30 tablet 0    Calcium Carb-Cholecalciferol (Caltrate 600+D3) 600-800 MG-UNIT TABS Take 1 tablet by mouth in the morning      clopidogrel (PLAVIX) 75 mg tablet Take 1 tablet (75 mg total) by mouth daily for 21 days Do not start before May 8, 2024. 21  "tablet 0    fluticasone (FLONASE) 50 mcg/act nasal spray 2 sprays into each nostril daily      Multiple Vitamins-Minerals (Centrum Silver 50+Women) TABS Take 1 tablet by mouth daily       No current facility-administered medications for this visit.       Allergies   Allergen Reactions    Pollen Extract Other (See Comments)       PAST HISTORY    Past Medical History:   Diagnosis Date    History of ovarian cyst     Melanoma of shoulder, right (HCC)        Past Surgical History:   Procedure Laterality Date    OVARIAN CYST REMOVAL Left        Social History     Tobacco Use    Smoking status: Former     Current packs/day: 0.00     Average packs/day: 0.5 packs/day for 31.7 years (15.8 ttl pk-yrs)     Types: Cigarettes     Start date:      Quit date: 2016     Years since quittin.7    Smokeless tobacco: Never   Vaping Use    Vaping status: Never Used   Substance Use Topics    Alcohol use: Yes     Alcohol/week: 2.0 standard drinks of alcohol     Types: 1 Cans of beer, 1 Glasses of wine per week    Drug use: Not Currently       Family History   Problem Relation Age of Onset    No Known Problems Mother     Diabetes Father     Heart murmur Father     No Known Problems Son     No Known Problems Son          Above history personally reviewed.       EXAM    Vitals:Blood pressure 130/88, pulse 71, temperature 98 °F (36.7 °C), temperature source Temporal, height 5' 9\" (1.753 m), weight 70.8 kg (156 lb), SpO2 98%, not currently breastfeeding.,Body mass index is 23.04 kg/m².     Physical Exam  Constitutional:       General: She is not in acute distress.     Appearance: Normal appearance. She is well-developed. She is not ill-appearing.   HENT:      Head: Normocephalic and atraumatic.      Right Ear: External ear normal.      Left Ear: External ear normal.      Nose: Nose normal.      Mouth/Throat:      Mouth: Mucous membranes are moist.   Eyes:      General: No scleral icterus.        Right eye: No discharge.         Left " eye: No discharge.      Extraocular Movements: Extraocular movements intact and EOM normal.      Conjunctiva/sclera: Conjunctivae normal.      Pupils: Pupils are equal, round, and reactive to light.   Cardiovascular:      Rate and Rhythm: Normal rate.   Pulmonary:      Effort: Pulmonary effort is normal. No respiratory distress.   Skin:     General: Skin is warm and dry.      Findings: No rash.   Neurological:      Mental Status: She is alert.      Motor: Motor strength is normal.  Psychiatric:         Mood and Affect: Mood normal.         Speech: Speech normal.         Behavior: Behavior normal.         Thought Content: Thought content normal.         Judgment: Judgment normal.         Neurologic Exam     Mental Status   Follows 3 step commands.   Attention: normal. Concentration: normal.   Speech: speech is normal   Level of consciousness: alert  Knowledge: good.   Normal comprehension.     Cranial Nerves     CN III, IV, VI   Pupils are equal, round, and reactive to light.  Extraocular motions are normal.   Right pupil: Shape: regular. Reactivity: brisk.   Left pupil: Shape: regular. Reactivity: brisk.   Nystagmus: none   Upgaze: normal  Downgaze: normal  Conjugate gaze: present    CN V   Facial sensation intact.     CN VII   Facial expression full, symmetric.     CN VIII   Hearing: intact    CN XI   Right trapezius strength: normal  Left trapezius strength: normal    CN XII   Tongue: not atrophic  Fasciculations: absent  Tongue deviation: none    Motor Exam   Muscle bulk: normal  Overall muscle tone: normal    Strength   Strength 5/5 throughout.     Sensory Exam   Light touch normal.     Gait, Coordination, and Reflexes     Gait  Gait: (use roller walker)    Tremor   Resting tremor: absent  Intention tremor: absent  Action tremor: absent        MEDICAL DECISION MAKING    Imaging Studies:     MRI brain w wo contrast    Result Date: 5/6/2024  Narrative: MRI BRAIN WITH AND WITHOUT CONTRAST INDICATION: brain mass.  COMPARISON: CT from 5/4/2024 TECHNIQUE: Multiplanar, multisequence imaging of the brain was performed before and after gadolinium administration. IV Contrast:  7 mL of Gadobutrol injection (SINGLE-DOSE) IMAGE QUALITY:   Diagnostic. FINDINGS: BRAIN PARENCHYMA: There is an evolving acute to subacute right thalamic lacunar infarct. There is no associated hemorrhage. There is also an acute to subacute right paramedian midbrain lacunar infarct. There is scattered chronic microvascular ischemic change. There is no midline shift. There is an avidly enhancing lesion along the left parietal convexity on series 10, image 92. This measures 2.2 x 1.8 x 2.2 cm. Findings are most consistent with a meningioma. There is no significant regional edema. There is associated calcification. No additional nodular areas of parenchymal or leptomeningeal enhancement identified. VENTRICLES:  Normal for the patient's age. SELLA AND PITUITARY GLAND:  Normal. ORBITS:  Normal. PARANASAL SINUSES: There is extensive pan paranasal sinus opacification with areas of low T2 signal which may be on the basis of inspissated secretions and/or fungal colonization. Restricted diffusion is noted within the right maxillary sinus which should be correlated clinically for the presence of acute sinusitis. VASCULATURE: Correlate with recent CTA. CALVARIUM AND SKULL BASE:  Normal. EXTRACRANIAL SOFT TISSUES: There is a left frontal trichilemmal cyst within the scalp. Findings were correlated with prior CT.     Impression: Acute to subacute lacunar infarcts involving the right paramedian midbrain and thalamus with corresponding low attenuation in the right thalamus on recent CT. No associated hemorrhage. Left parietal calcified lesion with associated enhancement is consistent with a meningioma. Extensive pan paranasal sinus disease as described above with areas of low T2 signal consistent with inspissated secretions and/or fungal colonization. Restricted diffusion  also noted within the right maxillary sinus which should be correlated clinically  for the presence of acute sinusitis. The study was marked in EPIC for immediate notification. Workstation performed: [id number]     CTA head and neck w wo contrast    Result Date: 5/5/2024  Narrative: CTA NECK AND BRAIN WITH AND WITHOUT CONTRAST INDICATION: stroke, dizziness,  ?left facial droop, age indetemrinant right thalamic infarct COMPARISON:   Head CT from earlier the same date. TECHNIQUE:  Routine CT imaging of the Brain without contrast.  Post contrast imaging was performed after administration of iodinated contrast through the neck and brain. Post contrast axial 0.625 mm images timed to opacify the arterial system. 3D rendering was performed on an independent workstation.   MIP reconstructions performed. Coronal reconstructions were performed of the noncontrast portion of the brain. Radiation dose length product (DLP) for this visit:  1334.28 mGy-cm .  This examination, like all CT scans performed in the [facility], was performed utilizing techniques to minimize radiation dose exposure, including the use of iterative reconstruction and automated exposure control. IV Contrast:  85 mL of iohexol (OMNIPAQUE) IMAGE QUALITY:   Diagnostic FINDINGS: NONCONTRAST BRAIN PARENCHYMA: Redemonstration of lacunar infarct in the right thalamus that is favored to the acute or subacute.. No acute hemorrhage. Stable left posterior frontal/parietal calcified extra-axial mass measuring 2.5 x 2.0 cm with adjacent hyperostosis that is most consistent with meningioma. Mild localized mass effect. No edema. Stable diminished attenuation in the periventricular and subcortical matter due to mild chronic microangiopathy. VENTRICLES AND EXTRA-AXIAL SPACES:  Normal for the patient's age. VISUALIZED ORBITS: Normal visualized orbits. PARANASAL SINUSES: Stable complete pansinus opacification with scattered high density material due to fungal  disease and/or inspissated secretions. Calvarium and extracranial soft tissues: Hyperostosis adjacent to the meningioma. Stable partially calcified left frontal scalp subcutaneous lesion favored represent sebaceous cyst. CERVICAL VASCULATURE AORTIC ARCH AND GREAT VESSELS: Mild atherosclerotic disease of the arch, proximal great vessels and visualized subclavian vessels.  No significant stenosis. RIGHT VERTEBRAL ARTERY CERVICAL SEGMENT: Mild stenosis at the origin. The vessel is normal in caliber throughout the neck. LEFT VERTEBRAL ARTERY CERVICAL SEGMENT: Hypoplastic. Normal origin. No significant stenosis RIGHT EXTRACRANIAL CAROTID SEGMENT: Calcified plaque at the bifurcation and proximal internal carotid artery with less than 50% stenosis. No dissection. LEFT EXTRACRANIAL CAROTID SEGMENT: Severe calcified plaque at the bifurcation causing high-grade stenosis. Residual lumen is difficult to measure due to artifact from the plaque. Left internal carotid artery is diffusely smaller in caliber compared with the right side and opacification is slightly attenuated likely due to critical stenosis at the bifurcation. NASCET criteria was used to determine the degree of internal carotid artery diameter stenosis. INTRACRANIAL VASCULATURE INTERNAL CAROTID ARTERIES: Mild atherosclerotic disease in the cavernous and supraclinoid internal carotid arteries  Again left internal carotid artery is diffusely smaller than the right without focal high-grade stenosis.. ANTERIOR CIRCULATION: No significant stenosis or occlusion of the anterior cerebral arteries. Right A1 segment is dominant.. MIDDLE CEREBRAL ARTERY CIRCULATION: No significant stenosis or occlusion of the bilateral M1 or M2 segments. DISTAL VERTEBRAL ARTERIES: Dominant right and hypoplastic left vertebral arteries are patent and without significant stenosis. Bilateral PICA origins are patent. BASILAR ARTERY:  Basilar artery is normal in caliber.  Normal superior  cerebellar arteries. POSTERIOR CEREBRAL ARTERIES: Fetal origin of the left PCA. Near fetal origin of the right PCA.   No significant stenosis or occlusion. VENOUS STRUCTURES:  Normal. NON VASCULAR ANATOMY BONY STRUCTURES:  No acute osseous abnormality. Spinal degenerative changes SOFT TISSUES OF THE NECK:  Unremarkable. THORACIC INLET: Fusiform ectasia of the ascending thoracic aorta measuring up to 40 mm. Recommendation is for follow-up low radiation dose chest CT in one year.     Impression: Redemonstration of lacunar infarct in the right thalamus that is favored to be acute or subacute. No acute hemorrhage. Chronic microangiopathy. Stable left posterior frontal/parietal calcified meningioma measuring 2.5 cm with localized mass effect. No edema. Severe calcified plaque at the left carotid bifurcation. Degree of stenosis is difficult to measure but left ICA is diffusely smaller than the right likely due to flow-limiting critical stenosis. Recommend vascular consult. No significant stenosis of the cervical right carotid or vertebral arteries No high-grade intracranial stenosis, large vessel occlusion or aneurysm Ectatic ascending thoracic aorta. Recommendation is for follow-up low radiation dose chest CT in one year. Preliminary report provided by [email or web address]. The study was marked in EPIC for immediate notification. Workstation performed: [id number]     CT head without contrast    Result Date: 5/4/2024  Narrative: CT BRAIN - WITHOUT CONTRAST INDICATION:   off balance.  Patient's family feels left-sided facial droop. Status post fall yesterday. Unsteadiness. COMPARISON:  None. TECHNIQUE:  CT examination of the brain was performed.  Multiplanar 2D reformatted images were created from the source data. Radiation dose length product (DLP) for this visit:  828 mGy-cm .  This examination, like all CT scans performed in the [facility], was performed utilizing techniques to minimize radiation dose  exposure, including the use of iterative reconstruction and automated exposure control. IMAGE QUALITY:  Diagnostic. FINDINGS: PARENCHYMA: Age indeterminate hypodensity in the right thalamus, series 2, image 19. No acute intracranial hemorrhage. Mild periventricular hypodensities. Dural based densely calcified extra-axial mass lateral to the left frontal lobe, measuring 2.5 cm with adjacent hyperostosis, most likely a meningioma. Mild local mass effect without edema. VENTRICLES AND EXTRA-AXIAL SPACES:  Normal for the patient's age. VISUALIZED ORBITS: Normal visualized orbits. PARANASAL SINUSES: Extensive pansinus opacity with mixed density opacification including scattered areas of hyperdensity. CALVARIUM AND EXTRACRANIAL SOFT TISSUES: Left frontal scalp peripherally calcified lesion series 2 image 35 possibly a sebaceous cyst or other cutaneous/subcutaneous abnormality measuring 1.6 cm.     Impression: 1. Age-indeterminate right thalamic lacunar infarction. Given the history of recent falling, this may represent a recent or subacute infarction. Further clinical assessment advised. 2. Large 2.5 cm extra-axial densely calcified mass with associated hyperostosis lateral to the left frontal lobe most likely a large meningioma with mild local mass effect. No subfalcine herniation or significant surrounding edema. Follow-up contrast-enhanced MRI of the brain and nonemergent neurosurgical assessment suggested. 3. Mild microangiopathy. 4. Extensive sinus disease with hyperdense elements possibly on the basis of fungal colonization or inspissated secretions. An obstructing mass including sinonasal polyposis or other etiologies not excluded. Follow-up nonemergent ENT assessment recommended. 5. Left frontal scalp abnormality with peripheral calcifications possibly sebaceous cyst. Direct clinical assessment advised. 7. No acute intracranial hemorrhage. Workstation performed: [id number]       I have personally reviewed pertinent  reports.   and I have personally reviewed pertinent films in PACS

## 2024-05-29 NOTE — ASSESSMENT & PLAN NOTE
Following with vascular surgery  Given degree of stenosis recommendation for CEA.  Plan for outpatient follow-up with vascular in August 2024 to allow patient to recover from her stroke prior to scheduling surgical intervention.

## 2024-05-29 NOTE — ASSESSMENT & PLAN NOTE
Continue aspirin, Plavix and statin per neurology  Blood pressure management with PCP  Outpatient neurology follow-up July 9, 2024

## 2024-05-29 NOTE — ASSESSMENT & PLAN NOTE
Patient presents today as a new patient for evaluation of meningioma  Patient sustained a fall on May 4 and presented to the emergency room for evaluation of gait instability and discoordination.  Diagnosed with right thalamic infarct and severe left ICA stenosis  Incidental finding of a 2.5 cm left frontal partially calcified mass consistent with meningioma    Imaging personally reviewed:   CT head without 5/4/2024: 2.5 cm XR axial densely calcified mass with associated hyperostosis in the left frontal region most consistent with meningioma causing mild local mass effect.  MRI brain with and without 5/5/2024: Again demonstrated approximately 2.5 cm left frontoparietal partially calcified mass with associated enhancement consistent with meningioma.  Minimal mass effect.  No associated edema.    Plan:   Continue to monitor for any neurological symptoms  MRI brain imaging as well as recent CT head reviewed in detail with the patient and her family.  We also compared her CT to a CT scan completed at an outside hospital in 2022 findings similar sized calcified mass.  We reviewed this finding is most consistent with a calcified meningioma.  Natural history of meningioma reviewed.  We discussed given calcification this is likely and present for a prolonged duration and unlikely to change significantly or become symptomatic.  Nevertheless, this is a new diagnosis for this patient as she was not aware of this prior and no other MRI imaging available.  We will proceed with ongoing surveillance with a repeat MRI brain with and without contrast in 1 year.  Labs ordered to be completed prior to imaging.  Patient will have outpatient follow-up with [facility] after completion of MRI in 1 year.  She is aware to call the office with any questions concerns or new symptoms prior to this.

## 2024-05-30 ENCOUNTER — OFFICE VISIT (OUTPATIENT)
Facility: REHABILITATION | Age: 69
End: 2024-05-30
Payer: COMMERCIAL

## 2024-05-30 ENCOUNTER — EVALUATION (OUTPATIENT)
Dept: OCCUPATIONAL THERAPY | Facility: REHABILITATION | Age: 69
End: 2024-05-30
Payer: COMMERCIAL

## 2024-05-30 DIAGNOSIS — I63.9 CEREBROVASCULAR ACCIDENT (CVA), UNSPECIFIED MECHANISM (HCC): Primary | ICD-10-CM

## 2024-05-30 LAB
DME PARACHUTE DELIVERY DATE ACTUAL: NORMAL
DME PARACHUTE DELIVERY DATE REQUESTED: NORMAL
DME PARACHUTE DELIVERY NOTE: NORMAL
DME PARACHUTE ITEM DESCRIPTION: NORMAL
DME PARACHUTE ORDER STATUS: NORMAL
DME PARACHUTE SUPPLIER NAME: NORMAL
DME PARACHUTE SUPPLIER PHONE: NORMAL

## 2024-05-30 PROCEDURE — 97530 THERAPEUTIC ACTIVITIES: CPT | Performed by: PHYSICAL THERAPIST

## 2024-05-30 PROCEDURE — 97166 OT EVAL MOD COMPLEX 45 MIN: CPT | Performed by: OCCUPATIONAL THERAPIST

## 2024-05-30 PROCEDURE — 97112 NEUROMUSCULAR REEDUCATION: CPT | Performed by: PHYSICAL THERAPIST

## 2024-05-30 NOTE — PROGRESS NOTES
OCCUPATIONAL THERAPY INITIAL EVALUATION:    05/30/2024  Sanam Walsheliane  1955  [id number]  Dia Redman MD   Diagnosis ICD-10-CM Associated Orders   1. Cerebrovascular accident (CVA), unspecified mechanism (HCC)  I63.9 Ambulatory referral to Occupational Therapy            Assessment    Assessment details: See skilled analysis for further details.         Skilled Analysis:  Pt is a 68 y.o. female referred to Occupational Therapy s/p Cerebrovascular accident (CVA), unspecified mechanism (HCC) [I63.9].   Pt participated in skilled OT evaluation and following formalized testing as well as clinical observation, Pt presents with the following areas of deficit:  LUE ataxia affecting gross motor and fine motor coordination, bilateral coordination/integration of LUE, hand to target precision with dysmetria evident, and difficulties with performing IADLs.  Pt also presented with impaired LUE proprioception/kinesthetic awareness and decreased speed and accuracy with FMC/prehension/in-hand manipulation abilities.  Pt will benefit from skilled Occupational Therapy services 2x/week for 6-8 weeks with focus on neuro re-ed, thera act, thera ex, self-care management to improve on the above deficits, improve functional use of LUE, and enhance overall QOL/independence.     Short Term Goals:  Pt will increase proprioception of LUE hand to target for improved functional reach vision occluded with ADL/IADL tasks  x 50% accuracy 4 weeks  Pt will increase LUE rate of manipulation for all FM tests x 10 seconds for improved functional performance/independence with salient tasks/IADLs 4 weeks  Pt will demo with decreased LUE ataxia with functional reach for normalized movement pattern of  L UE and for improved hand to target accuracy x 50% 4 weeks  Pt will increase L UE  strength to #48 through the use of strengthening exercises and home program for eventual return to completion of simple meal prepping with increased  "independence 4 weeks  Pt will demo with improved bilateral coordination for completion of simple meal prepping activities for an increased PSFS level to 9/10 4 weeks      Long Term Goals:  Pt will increase proprioception of LUE hand to target for improved functional reach vision occluded with ADL/IADL tasks  x 85% accuracy   Pt will increase LUE rate of manipulation for all FM tests x 15 seconds for improved functional performance/independence with salient tasks/IADLs   Pt will demo with decreased LUE ataxia with functional reach for normalized movement pattern of  L UE and for improved hand to target accuracy x 85%   Pt will increase L UE  strength to #50 through the use of strengthening exercises and home program for eventual return to completion of simple meal prepping with increased independence   Pt will demo with improved bilateral coordination for completion of simple meal prepping activities for an increased PSFS level to 10/10      Subjective Evaluation    Quality of life: good          SUBJECTIVE: \"I just notice my L coordination.  It is hard to use it.\"    PATIENT GOAL: \"I want to be able to be more active again.  Driving and all.\"    Patient-Specific Functional Scale   Task is scored 0 (unable to perform activity) to 10 (ability to perform activity independently)    Activity Date: 05/30/24 Date:   Improved abilities making a sandwich 8/10    2.   Improved coordination of LUE 5/10        HISTORY OF PRESENT ILLNESS:     Pt is a 68 y.o. female who was referred to Occupational Therapy s/p  Cerebrovascular accident (CVA), unspecified mechanism (HCC) [I63.9]. As per hospital reports, Pt presented to the emergency room on 05/04/2024 with a chief complaint of gait instability and discoordination for 24 hours.  Pt had initially had seen her dentist due to right lower jaw pain and then trialed NSAIDs.  Pt reported later in the day that she was having difficulty with walking in her living room.  Pt fell without " "loss of consciousness or head strike.  Pt was able to ambulate, but felt like she was \"intoxicated\".  Her symptoms improved however she was having difficulty with operating her cellular device.  On arrival to the emergency room there was concern for possible left facial droop she was out of the window for TNK and loaded with Plavix and aspirin.   Urgent neurologic consultation was obtained and a head CT showed an age indeterminant right thalamic infarct as well as a 2.5 cm extra-axial densely calcified mass with associated hyperostosis lateral to the left frontal lobe consistent with previously known large meningioma.   CTA showed no large vessel occlusion although she did have evidence of left ICA flow-limiting critical stenosis.   Pt was evaluated in consultation by the neurology service and underwent MRI which confirmed right thalamic stroke.  Given critical left ICA stenosis he was evaluated by vascular surgery with plans for outpatient carotid endarterectomy.  In terms of her meningioma, the case was discussed with neurosurgery who agreed to see the Pt as an outpatient and felt there was no urgent neurosurgical needs.   Pt was discharged to home environment with plans to remain on dual antiplatelet therapy for 21 days followed by monotherapy with aspirin.  Pt was evaluated by physical therapy and Occupational Therapy and did not require inpatient rehabilitation.  Pt was recommended for further OP OT/PT services.  Pt had a f/u appt with neurosurgery 2* large meningioma with recommendations to return in one year for MRI.      Pt with self-report of continuing to notice lack of gross motor coordination with LUE affecting bilateral coordination, functional use of LUE, and hand to target precision.  Pt with self-report of noticing occasionally knocking over items.    Pt lives in a Senior Appt independently with frequent visits from Dtr in Law.  Pt does have elevator access.  Pt currently performing all ADLs at Mod I " status and occasional Supervision for bathing routines.  Pt currently requires assistance for IADL completion at this time 2* safety reasons.  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  Pt loss of  role since CVA-- Pt's Dtr in Law is main transportation at this time.         PMH:   Past Medical History:   Diagnosis Date    History of ovarian cyst     Melanoma of shoulder, right (HCC)          Pain Levels:     Restin    With Activity:  7    Pt with occasional pain in L shoulder-- Pt with self-report of falling on to L shoulder when experiencing CVA.  X-ray was unremarkable.     Objective     Functional Assessment        Comments  See impairment section for further details.       Impairment Observations:        IE RUE IE LUE  Comments                 UPPER EXTREMITY FXN Intact Impaired  Pt is R hand dominant                            /Pinch Strength           Dynamometer       - Gross Grasp 55 lbs 43 lbs  abnormal   Pinch Meter        - PINCER 7.5 lbs 7.5 lbs  within normal limits    - TRIPOD 15 lbs 12 lbs  within normal limits    - LATERAL 15 lbs  15 lbs  within normal limits               AROM (seated)        Elevation WFL WFL     Shoulder FF WFL  WFL      Shoulder Ext WFL  WFL      Shoulder Abd WFL  WFL      Shoulder Add WFL  WFL      Horizontal Abd WFL  WFL      Horizontal Add WFL  WFL      Elbow Flex WFL  WFL      Elbow Ext WFL  WFL      Pronation WFL  WFL      Supination WFL  WFL      Wrist Flex WFL  WFL      Wrist Ext WFL  WFL      Digit Flex WFL  WFL      Digit Ex WFL  WFL      Composite Grasp WFL  WFL      Hook Grasp WFL  WFL      Opposition Intact  Bradykinetic pace      Dysdiadochokinesia Impaired bilateral coordination 2* delayed motor lag of LUE Impaired bilateral coordination 2* delayed motor lag of LUE                          MMT           Shoulder FF 5/5 5/5     Shoulder Ext 5/5 5/5     Shoulder Abd 5/5 5/5     Shoulder ADd 5/5 5/5     Elbow Flex 5/5 5/5     Elbow Ext 5/5 5/5     Wrist Flex  5/5 5/5     Wrist Ext 5/5 5/5     SENSATION       Myofilaments (2.83 WNL) 2.83 2.83     Sharp Dull  Intact Intact     Proprioception Intact Impaired     Hot/Cold Temp Intact Intact            COORDINATION       9 Hole Peg Test 23.14 seconds 1 minute and 20 seconds x 3 droppage  abnormal   Fxnl Dexterity Test 40.09 seconds 1 minute and 15 seconds x 1 droppage  abnormal   MODIFIED MELISSA SCALE (TONE)       No increase in muscle tone (0) 0 0     Slight Increase in muscle tone with catch and release or min resist at end range (1)       Slight Increase in muscle tone with catch and release, followed by min resistance through remainder of range (1+)       Increased muscle tone through full range, able to be moved easily (2)       Considerable increase in tone, difficult to move (3)       Rigid in Flexion/Extension (4)                                     Upper Extremity Function (Fine motor, ADL):    Are you able to turn a key in a lock? Without any difficulty  Are you able to brush your teeth? Without any difficulty  Are you able to make a phone call using a touch tone or key-pad? Without any difficulty  Are you able to  coins from a table top? With some difficulty  Are you are able to write with a pen or pencil? Without any difficulty  Are you able to open and close a zipper? Without any difficulty  Are you able to wash and dry your body? Without any difficulty  Are you able to shampoo your hair? Without any difficulty  Are you able to open previously opened jars? Without any difficulty  Are you able to hold a plate full of food? Without any difficulty  Are you able to pull on trousers? Without any difficulty  Are you able to button your shirt? Without any difficulty  Are you able to trim your fingernails? Without any difficulty  Are you able to cut your toe nails? With some difficulty  Are you able to bend down and  clothing from the floor? Without any difficulty  How much DIFFICULTY do you currently  have using a spoon to eat a meal? No difficulty  How much DIFFICULTY do you currently have putting on a pullover shirt? No difficulty  How much DIFFICULTY do you currently have taking off a pullover shirt? No difficulty  How much DIFFICULTY do you currently have removing wrappings from small objects? Some difficulty  How much DIFFUCULTY do you currently have opening medications or vitamin containers (e.g. childproof containers, small bottles)? Some difficulty      OTHER PLANNED THERAPY INTERVENTIONS:   Supine, seated, and in stance neuro re-ed  FMC/prehension/in-hand manipulation  Bilateral integrative task  Timed Trials  Proprioceptive training of LUE  Hand to target  Seated functional reach: crossing midline  WBearing strategies   Closed chain activities    INTERVENTION COMMENTS:  Diagnosis: Cerebrovascular accident (CVA), unspecified mechanism (HCC) [I63.9]  Precautions: fall risk  Insurance: Payor: [employer]LIN ANDRE REP / Plan: [employer] MED[employer]ARE ADVANTRA / Product Type: Medicare HMO /   1 of 10 visits, PN due 06/30/2024

## 2024-05-30 NOTE — PROGRESS NOTES
"PHYSICAL THERAPY TREATMENT     Date: 24  Name: Sanam Castro  : 1955  Referring Provider: Dia Redman MD  AUTHORIZATION:   Insurance: Payor: [employer]LIN  REP / Plan: [employer] MEDICARE ADVANTRA / Product Type: Medicare HMO /     SUBJECTIVE:  HPI: Sanam Castro is a 68 y.o. female referred to outpatient physical therapy for the following diagnosis   Encounter Diagnosis   Name Primary?    Cerebrovascular accident (CVA), unspecified mechanism (HCC) Yes       Present with daughter-in-law Jannet.  Followed with vascular surgeon last week, they will allow healing and follow in 3 months.    Patient-Specific Functional Scale   Task is scored 0 (unable to perform activity) to 10 (ability to perform activity independently)  Activity     Get back to being active.     2.   Not being reliant on the walker.     3.   Return to driving.         OBJECTIVE:    Precautions - fall risk    Specialty Treatment Diary  24     Home exercise program /70 /82 Sit to stand   Walk in home with rollingwalker     (Ther-ex, neuromuscular re-ed)Walking/endurance        (Neuromuscular re-ed) Static and dynamic balance/anticipatory / reactive Blaze pod reactive balance in solostep  6 targets on ground over 20'  1 min x 5 sets    Same with foam incline  6 targets  1 min x 3 sets    Then 20' line   6 targets  4\" step  Foam incline  1 min x 4 sets    Passing soccer ball off walls   3.5 min x 2 sets    Resisted walking 20 lbs 20 feet down and back  About 3 min  Same laterally about 4 min    Walk and catch ball all planes, 3 min  Then similar with change of direction forwards to back, and turning 180 degrees with ball catch 3.5 min    Treadmill 6 minutes at 0.6 mph      Overground walking in solostep throughout  2 min no AD  100 feet as fast as possible   X 5 sets best 25 seconds  Then carry 5 lbs on bungee x 2-3 sets  Attempted pull 30 lbs, back pain, discontinued  Then 100 feet with head turns all planes " "about 5 sets  Then 360 degree turns 4 min  Then passing soccer ball 3.5 min  Then turning to catch bounced ball, 3.5 min   RPS 6/10  Blaze pod reactive balance 18' line in solostep 45 sec x 4 sets   Treadmill 0.5 - 0.8 mph 45 sec - 1 min x 4-5 sets      (Ther-ex) Strengthening        (There-ex) Stretching                     Vitals     Blood pressure (resting, left arm unless noted) 108/70    Heart rate (resting) 69 bpm  99% SpO2       Oculomotor & Coordination     Smooth pursuit & conjugate gaze Normal    Fingertip to nose & rapidly alternating hand movements Discoordination with left alternating hand tapping  Full left shoulder elevation, full elbow extension, wrist and finger flexion and extension  Dysmetria left fingertip to nose     Roughly 4+/5 right shoulder abduction, elbow flexion and extension, wrist extension, 5/5 right  5/5 hip flexion, knee extension, dorsiflexion bilaterally  Denies paresthesias.     Static Balance     Romberg Normal, able to maintain balance but increased postural sway      Mobility Measures 5/22/24   5 Time Sit to Stand  (17\" chair, arms across chest) 14.7 seconds   3 Meter Timed  Up & Go 8.9 seconds   Walking speed (self-selected)    Functional Gait Assessment (see below) 12/30   6 Minute Walk Test (100 foot circular course) 1300 feet    Ending heart rate 90s bpm   Patient-Reported Outcome Measure: Activities-Specific Balance Confidence Scale (ABC Scale) 49%     Core Movement Tasks Description of task    Sitting Normal   Sitting to Standing Normal   Standing Normal   Walking Relatively even step lengths, mild increase in double limb support; moderate decrease in left arm swing, arm posturing in mild scaption with minimal elbow excursion   Step-up Using railings, step-to gait   Reach, grasp, manipulate See above     Functional Gait Assessment  2/3 Gait level surface  2/3 Change in gait speed  2/3 Gait with horizontal head turns  2/3 Gait with vertical head turns  2/3 Gait and pivot " turn  1/3 Step over obstacle  0/3 Gait with narrow base of support  2/3 Gait with eyes closed  2/3 Ambulating backwards  1/3 Steps - not tested inferred  16/30 Total score (less than 22/30 indicates increased risk of fall)..      ASSESSMENT:  Jaimie is a 68 year old female with mobility limitations following CVA 5/04/24.  She will followed with occupational therapy evaluation today.    Retest FGA, anticipate further improvement here.    We did all exercises without assistive devices.  Balance confidence is a limiting factor here but willing to challenge herself appropriately.      SHORT-TERM GOALS: by 6/05/24  1. Patient scores at least 16/30 on FGA.  2. Patient walks at least 1500 feet during 6 minute walk test.  3. Patient safely walks within the home with a cane.    LONG-TERM GOALS: by 7/22/24   1. Patient returns to prior level of community walking.  2. Patient scores at least 23/30 on FGA for safe mobility without assistive device.    PLAN OF CARE:  Patient will benefit from physical therapy 2 times per week for 2 months  Neuromuscular re-education, therapeutic exercises, and therapeutic activities as outlined in grids.    Gerald Daniels, PT  5/30/2024

## 2024-05-31 ENCOUNTER — VBI (OUTPATIENT)
Dept: ADMINISTRATIVE | Facility: OTHER | Age: 69
End: 2024-05-31

## 2024-06-02 ENCOUNTER — APPOINTMENT (EMERGENCY)
Dept: RADIOLOGY | Facility: HOSPITAL | Age: 69
End: 2024-06-02
Payer: COMMERCIAL

## 2024-06-02 ENCOUNTER — HOSPITAL ENCOUNTER (EMERGENCY)
Facility: HOSPITAL | Age: 69
Discharge: HOME | End: 2024-06-02
Attending: EMERGENCY MEDICINE
Payer: COMMERCIAL

## 2024-06-02 VITALS
DIASTOLIC BLOOD PRESSURE: 84 MMHG | WEIGHT: 158.73 LBS | BODY MASS INDEX: 23.44 KG/M2 | OXYGEN SATURATION: 98 % | HEART RATE: 78 BPM | TEMPERATURE: 98.3 F | SYSTOLIC BLOOD PRESSURE: 156 MMHG | RESPIRATION RATE: 18 BRPM

## 2024-06-02 DIAGNOSIS — K59.00 CONSTIPATION: Primary | ICD-10-CM

## 2024-06-02 PROCEDURE — 99284 EMERGENCY DEPT VISIT MOD MDM: CPT

## 2024-06-02 PROCEDURE — 99284 EMERGENCY DEPT VISIT MOD MDM: CPT | Performed by: PHYSICIAN ASSISTANT

## 2024-06-02 PROCEDURE — 74018 RADEX ABDOMEN 1 VIEW: CPT

## 2024-06-02 RX ORDER — DOCUSATE SODIUM 100 MG/1
100 CAPSULE, LIQUID FILLED ORAL EVERY 12 HOURS PRN
Qty: 60 CAPSULE | Refills: 0 | Status: SHIPPED | OUTPATIENT
Start: 2024-06-02

## 2024-06-02 RX ORDER — POLYETHYLENE GLYCOL 3350 17 G/17G
17 POWDER, FOR SOLUTION ORAL DAILY PRN
Qty: 10 EACH | Refills: 0 | Status: SHIPPED | OUTPATIENT
Start: 2024-06-02

## 2024-06-02 RX ORDER — MAGNESIUM CARB/ALUMINUM HYDROX 105-160MG
148 TABLET,CHEWABLE ORAL ONCE AS NEEDED
Qty: 296 ML | Refills: 0 | Status: SHIPPED | OUTPATIENT
Start: 2024-06-02

## 2024-06-02 RX ORDER — MAGNESIUM CARB/ALUMINUM HYDROX 105-160MG
148 TABLET,CHEWABLE ORAL ONCE
Status: COMPLETED | OUTPATIENT
Start: 2024-06-02 | End: 2024-06-02

## 2024-06-02 RX ADMIN — BE HEALTH MAGNESIUM CITRATE ORAL SOLUTION - LEMON 148 ML: 1.75 LIQUID ORAL at 14:34

## 2024-06-02 NOTE — DISCHARGE INSTRUCTIONS
Please refer to the attached information for strict return instructions. If symptoms worsen or new symptoms develop please return to the ER. You may use an additional dose of magnesium citrate (as prescribed) if constipation persists. You can begin taking docusate and miralax tomorrow if constipation persists.

## 2024-06-02 NOTE — ED PROVIDER NOTES
"History  Chief Complaint   Patient presents with    Constipation     Reports \"I'm unable to poop\", difficulty with BM for three weeks, took stool softner with some relief     Sanam is a 68-year-old female presenting with constipation over the past several weeks following recent admission/discharge for CVA.  She reports she does have daily bowel movements although they are small, hard to pass.  Today she feels as though she has significant stool quality in her rectum and feels as though she is unable to clear it.  She tried docusate yesterday with small subsequent bowel movement.  Denies any abdominal pain.  No fevers or chills.  No nausea or vomiting.  No urinary issues.      History provided by:  Patient      Prior to Admission Medications   Prescriptions Last Dose Informant Patient Reported? Taking?   Calcium Carb-Cholecalciferol (Caltrate 600+D3) 600-800 MG-UNIT TABS  Self Yes No   Sig: Take 1 tablet by mouth in the morning   Multiple Vitamins-Minerals (Centrum Silver 50+Women) TABS  Self Yes No   Sig: Take 1 tablet by mouth daily   amLODIPine-benazepril (LOTREL 5-10) 5-10 MG per capsule  Self No No   Sig: Take 1 capsule by mouth daily   aspirin 81 mg chewable tablet  Self No No   Sig: Chew 1 tablet (81 mg total) daily Do not start before May 8, 2024.   atorvastatin (LIPITOR) 40 mg tablet  Self No No   Sig: Take 1 tablet (40 mg total) by mouth every evening   clopidogrel (PLAVIX) 75 mg tablet  Self No No   Sig: Take 1 tablet (75 mg total) by mouth daily for 21 days Do not start before May 8, 2024.   fluticasone (FLONASE) 50 mcg/act nasal spray  Self Yes No   Si sprays into each nostril daily      Facility-Administered Medications: None       Past Medical History:   Diagnosis Date    History of ovarian cyst     Melanoma of shoulder, right (HCC)        Past Surgical History:   Procedure Laterality Date    OVARIAN CYST REMOVAL Left        Family History   Problem Relation Age of Onset    No Known Problems " Mother     Diabetes Father     Heart murmur Father     No Known Problems Son     No Known Problems Son      I have reviewed and agree with the history as documented.    E-Cigarette/Vaping    E-Cigarette Use Never User      E-Cigarette/Vaping Substances    Nicotine No     THC No     CBD No     Flavoring No     Other No     Unknown No      Social History     Tobacco Use    Smoking status: Former     Current packs/day: 0.00     Average packs/day: 0.5 packs/day for 31.7 years (15.8 ttl pk-yrs)     Types: Cigarettes     Start date:      Quit date: 2016     Years since quittin.7    Smokeless tobacco: Never   Vaping Use    Vaping status: Never Used   Substance Use Topics    Alcohol use: Yes     Alcohol/week: 2.0 standard drinks of alcohol     Types: 1 Cans of beer, 1 Glasses of wine per week    Drug use: Not Currently       Review of Systems   Constitutional:  Negative for chills and fever.   HENT:  Negative for congestion, rhinorrhea and sore throat.    Eyes:  Negative for pain and visual disturbance.   Respiratory:  Negative for cough, shortness of breath and wheezing.    Cardiovascular:  Negative for chest pain and palpitations.   Gastrointestinal:  Positive for constipation. Negative for abdominal pain, blood in stool, diarrhea, nausea and vomiting.   Genitourinary:  Negative for dysuria, frequency and urgency.   Musculoskeletal:  Negative for back pain, neck pain and neck stiffness.   Skin:  Negative for rash and wound.   Neurological:  Negative for dizziness, weakness, light-headedness and numbness.       Physical Exam  Physical Exam  Constitutional:       General: She is not in acute distress.     Appearance: She is well-developed. She is not diaphoretic.   HENT:      Head: Normocephalic and atraumatic.      Right Ear: External ear normal.      Left Ear: External ear normal.   Eyes:      Extraocular Movements:      Right eye: Normal extraocular motion.      Left eye: Normal extraocular motion.       Conjunctiva/sclera: Conjunctivae normal.      Pupils: Pupils are equal, round, and reactive to light.   Cardiovascular:      Rate and Rhythm: Normal rate and regular rhythm.   Pulmonary:      Effort: Pulmonary effort is normal. No accessory muscle usage or respiratory distress.      Breath sounds: No wheezing or rales.   Abdominal:      General: Abdomen is flat. There is no distension.      Tenderness: There is no abdominal tenderness. There is no right CVA tenderness, left CVA tenderness or guarding.   Genitourinary:     Comments: Rectal exam performed with RN as chaperone.  No significant fecal impaction appreciated in rectal vault.  Musculoskeletal:      Cervical back: Normal range of motion. No rigidity.   Skin:     General: Skin is warm and dry.      Capillary Refill: Capillary refill takes less than 2 seconds.      Findings: No erythema or rash.   Neurological:      Mental Status: She is alert and oriented to person, place, and time.      Motor: No abnormal muscle tone.      Coordination: Coordination normal.   Psychiatric:         Behavior: Behavior normal.         Thought Content: Thought content normal.         Judgment: Judgment normal.         Vital Signs  ED Triage Vitals   Temperature Pulse Respirations Blood Pressure SpO2   06/02/24 1226 06/02/24 1226 06/02/24 1226 06/02/24 1226 06/02/24 1226   98.3 °F (36.8 °C) 98 18 (!) 177/85 99 %      Temp src Heart Rate Source Patient Position - Orthostatic VS BP Location FiO2 (%)   -- 06/02/24 1226 06/02/24 1416 06/02/24 1416 --    Monitor Lying Right arm       Pain Score       --                  Vitals:    06/02/24 1226 06/02/24 1416   BP: (!) 177/85 156/84   Pulse: 98 78   Patient Position - Orthostatic VS:  Lying         Visual Acuity      ED Medications  Medications   magnesium citrate (CITROMA) oral solution 148 mL (148 mL Oral Given 6/2/24 1434)       Diagnostic Studies  Results Reviewed       None                   XR abdomen 1 view kub    (Results  Pending)              Procedures  Procedures         ED Course                               SBIRT 22yo+      Flowsheet Row Most Recent Value   Initial Alcohol Screen: US AUDIT-C     1. How often do you have a drink containing alcohol? 0 Filed at: 06/02/2024 1324   2. How many drinks containing alcohol do you have on a typical day you are drinking?  0 Filed at: 06/02/2024 1324   3a. Male UNDER 65: How often do you have five or more drinks on one occasion? 0 Filed at: 06/02/2024 1324   3b. FEMALE Any Age, or MALE 65+: How often do you have 4 or more drinks on one occassion? 0 Filed at: 06/02/2024 1324   Audit-C Score 0 Filed at: 06/02/2024 1324   LETA: How many times in the past year have you...    Used an illegal drug or used a prescription medication for non-medical reasons? Never Filed at: 06/02/2024 1324                      Medical Decision Making  Constipation over the past several weeks following recent discharge, she has been able to pass small amounts of stool but hard and difficult to pass.  Slight relief with Colace yesterday but continues to feel rectal pressure from stool.  She does not appear to have significant fecal impaction on rectal exam.  X-ray of abdomen with moderate stool burden, otherwise normal on my initial read.  Given dose of magnesium citrate here with subsequent bowel movement and relief of reported rectal pressure.  Will prescribe repeat dose as needed x 1, recommend start MiraLAX/Colace tomorrow for short course and follow-up primary care physician for reevaluation.  Return to ED indications reviewed.    Amount and/or Complexity of Data Reviewed  Radiology: ordered.    Risk  OTC drugs.             Disposition  Final diagnoses:   Constipation     Time reflects when diagnosis was documented in both MDM as applicable and the Disposition within this note       Time User Action Codes Description Comment    6/2/2024  2:45 PM Ga Smith Add [K59.00] Constipation           ED  Disposition       ED Disposition   Discharge    Condition   Stable    Date/Time   Sun Jun 2, 2024 1445    Comment   Sanam Castro discharge to home/self care.                   Follow-up Information       Follow up With Specialties Details Why Contact Info Additional Information    Dia Redman MD Family Medicine Schedule an appointment as soon as possible for a visit   [address]  [address]  [facility] [address]  [phone]       [facility] Emergency Medicine  If symptoms worsen [address] [address] [phone] [facility], [address], [place], [address]            Discharge Medication List as of 6/2/2024  2:57 PM        START taking these medications    Details   docusate sodium (COLACE) 100 mg capsule Take 1 capsule (100 mg total) by mouth every 12 (twelve) hours as needed for constipation, Starting Sun 6/2/2024, Normal      magnesium citrate (CITROMA) 1.745 g/30 mL oral solution Take 148 mL by mouth once as needed (Constipation) for up to 1 dose, Starting Sun 6/2/2024, Normal      polyethylene glycol (MIRALAX) 17 g packet Take 17 g by mouth daily as needed (Constipation), Starting Sun 6/2/2024, Normal           CONTINUE these medications which have NOT CHANGED    Details   amLODIPine-benazepril (LOTREL 5-10) 5-10 MG per capsule Take 1 capsule by mouth daily, Starting Tue 5/7/2024, Until Thu 6/6/2024, Normal      aspirin 81 mg chewable tablet Chew 1 tablet (81 mg total) daily Do not start before May 8, 2024., Starting Wed 5/8/2024, Until Fri 6/7/2024, Normal      atorvastatin (LIPITOR) 40 mg tablet Take 1 tablet (40 mg total) by mouth every evening, Starting Tue 5/7/2024, Until Thu 6/6/2024, Normal      Calcium Carb-Cholecalciferol (Caltrate 600+D3) 600-800 MG-UNIT TABS Take 1 tablet by mouth in the morning, Historical Med      clopidogrel (PLAVIX) 75 mg tablet Take 1 tablet (75  mg total) by mouth daily for 21 days Do not start before May 8, 2024., Starting Wed 5/8/2024, Until Wed 5/29/2024, Normal      fluticasone (FLONASE) 50 mcg/act nasal spray 2 sprays into each nostril daily, Historical Med      Multiple Vitamins-Minerals (Centrum Silver 50+Women) TABS Take 1 tablet by mouth daily, Historical Med             No discharge procedures on file.    PDMP Review       None            ED Provider  Electronically Signed by             Ga Smith PA-C  06/02/24 3845

## 2024-06-03 PROBLEM — J32.9 RECURRENT SINUSITIS: Status: RESOLVED | Noted: 2024-05-04 | Resolved: 2024-06-03

## 2024-06-04 ENCOUNTER — APPOINTMENT (OUTPATIENT)
Facility: REHABILITATION | Age: 69
End: 2024-06-04
Payer: COMMERCIAL

## 2024-06-04 ENCOUNTER — APPOINTMENT (OUTPATIENT)
Dept: OCCUPATIONAL THERAPY | Facility: REHABILITATION | Age: 69
End: 2024-06-04
Payer: COMMERCIAL

## 2024-06-10 ENCOUNTER — OFFICE VISIT (OUTPATIENT)
Dept: OCCUPATIONAL THERAPY | Facility: REHABILITATION | Age: 69
End: 2024-06-10
Payer: COMMERCIAL

## 2024-06-10 DIAGNOSIS — I63.9 CEREBROVASCULAR ACCIDENT (CVA), UNSPECIFIED MECHANISM (HCC): Primary | ICD-10-CM

## 2024-06-10 PROCEDURE — 97110 THERAPEUTIC EXERCISES: CPT | Performed by: OCCUPATIONAL THERAPIST

## 2024-06-10 PROCEDURE — 97530 THERAPEUTIC ACTIVITIES: CPT | Performed by: OCCUPATIONAL THERAPIST

## 2024-06-10 NOTE — PROGRESS NOTES
"Daily Note     Today's date: 6/10/2024  Patient name: Sanam Castro  : 1955  MRN: [id number]  Referring provider: Dia Redman MD  Dx:   Encounter Diagnosis     ICD-10-CM    1. Cerebrovascular accident (CVA), unspecified mechanism (HCC)  I63.9                      Subjective: \"I carried something with my L arm today.\"      Objective: See treatment description below    Thera Ex: UBE x 5 minutes prograde and x 5 minutes retrograde in seated position bilaterally with resistance maintaining at 1.5 level throughout focusing on improved LUE strength, endurance training, and improved gross motor control of LUE.     Thera Act: Pt advanced to performing x 5 trials of target taps on BITS screen in all planes with LUE with added #1.5 wrist weight donned and with added central fixation as distraction to encourage improved LUE gross motor control, hand to target precision, improved LUE reaction time, and       With wrist weight:  Trial #1: 18.87% accuracy; 53 seconds to complete; 2.48 second reaction time; 10 hits accurately  Trial #2: 24.39% accuracy; 51 seconds to complete; 2.33 second reaction time; 10 hits accurately  Trial #3: 57.14% accuracy; 51 seconds to complete; 2.02 second reaction time; 16 hits accurately  Trial #4: 53.85% accuracy; 43 seconds to complete; 1.51 second reaction time; 14 hits accurately  Trial #5: 69.57% accuracy; 40 seconds to complete; 1.82 second reaction time; 17 hits accurately    Without wrist weight:  Trial #1: 68% accuracy; 53 seconds to complete; 2.48 second reaction time; 10 hits accurately  Trial #2: 48% accuracy; 45 seconds to complete; 2.12 second reaction time; 14 hits accurately    Pt concluded tx session with completing ball bounce to floor unilaterally with LUE only focusing on improved reaction time, improved gross motor control, and hand to target precision.      Assessment: Tolerated treatment well. Patient would benefit from continued OT    Pt demo with G activity " tolerance to UBE demands with abilities to complete x 10 minutes without complaints of fatigue and with abilities to maintain excellent pacing throughout.  Pt presented with increased difficulties with identifying targets placed in L peripheral visual fields with often times missing targets verses when presented on R ride of visual field.  Pt demo with improved accuracy for hand to target demands as activity persisted with abilities to increased accuracy % from 18 to 69.  Pt demo with improved overall gross motor control with wrist weight donned verses without improving overall hand to target precision, gross motor control, and kinesthetic awareness.  Pt demo with initial Max difficulties with catching ball after bouncing on floor 2* poor reaction time and gross motor control with ongoing ataxia presented.  Pt did present with slight improvements with repetition     Plan: Continue per plan of care.      INTERVENTION COMMENTS:  Diagnosis: Cerebrovascular accident (CVA), unspecified mechanism (HCC) [I63.9]  Precautions: fall risk  Insurance: Payor: [employer]LIN ANDRE REP / Plan: [employer]ARE ADVANTRA / Product Type: Medicare HMO /   2 of 10 visits, PN due 06/30/2024

## 2024-06-12 ENCOUNTER — OFFICE VISIT (OUTPATIENT)
Dept: OCCUPATIONAL THERAPY | Facility: REHABILITATION | Age: 69
End: 2024-06-12
Payer: COMMERCIAL

## 2024-06-12 ENCOUNTER — OFFICE VISIT (OUTPATIENT)
Facility: REHABILITATION | Age: 69
End: 2024-06-12
Payer: COMMERCIAL

## 2024-06-12 DIAGNOSIS — I63.9 CEREBROVASCULAR ACCIDENT (CVA), UNSPECIFIED MECHANISM (HCC): Primary | ICD-10-CM

## 2024-06-12 PROCEDURE — 97530 THERAPEUTIC ACTIVITIES: CPT | Performed by: PHYSICAL THERAPIST

## 2024-06-12 PROCEDURE — 97110 THERAPEUTIC EXERCISES: CPT | Performed by: OCCUPATIONAL THERAPIST

## 2024-06-12 PROCEDURE — 97530 THERAPEUTIC ACTIVITIES: CPT | Performed by: OCCUPATIONAL THERAPIST

## 2024-06-12 NOTE — PROGRESS NOTES
"Daily Note     Today's date: 2024  Patient name: Sanam Castro  : 1955  MRN: [id number]  Referring provider: Dia Redman MD  Dx:   Encounter Diagnosis     ICD-10-CM    1. Cerebrovascular accident (CVA), unspecified mechanism (HCC)  I63.9                      Subjective: \"I am not sleeping well.  This has been going on before my stroke too.\"      Objective: See treatment description below    Thera Ex: UBE x 5 minutes prograde and x 5 minutes retrograde in seated position bilaterally with increased resistance to 1.7 level throughout focusing on improved LUE strength, endurance training, and improved gross motor control of LUE.     Thera Act: OTR educated Pt on sleep hygiene strategies to improve sleep quality and quantity.  OTR educated Pt on the following strategies:     - Get regular: got to bed and wake up at more or less the same time every day  - Sleep when sleepy: only try to sleep when you actually feel tired or sleepy, rather than spending too much time awake in bed  - Get up and try again: If you haven't been able to fall asleep after about 20 minutes or more, get up and do something calming or boring until you feel sleepy, then return to bed and try again  - Avoid caffeine, nicotine, alcohol at lead 4-6 hours before going to sleep  - Bed is for sleeping: Try not to use your bed for anything other than sleeping and sex, so that your body associates bed with sleep  - No naps: It's best to avoid taking naps during the day, to make sure that you are tired at bedtime.  If you cannot make it through the day without a nap, make sure it is for less than an hour and before 3 pm  - Sleep rituals:   - Bath time: having a hot bath/shower 1-2 hours before bedtime can be useful  - No clock watching  - Use a sleep diary to track your sleep  - Regular exercise  - Eat a well-balanced diet  - The right space: It is very important that your bed and bedroom are quiet and comfortable for sleeping.    - Keep " daytime routine the same     Pt transitioned to performing in stance functional reach for dowel retrieval and placement in dowel board utilizing composite grasp to calibrated hand gripper at level 2 resistance and with use of mirror for visual feedback to challenge LUE proprioception/kinesthetic awareness, improved L distal strength, improved L FMC/prehension, and improved hand to target precision with less frequency of ataxia.    Pt concluded tx session with bilateral integrative task of tying/untying knots in blue theraband focusing on improved bilateral coordination/bimanual dexterity, increased L intrinsic/distal strength, and improved speed/accuracy of distal motor functioning.         Assessment: Tolerated treatment well. Patient would benefit from continued OT    Pt demo with G activity tolerance to UBE demands with abilities to complete x 10 minutes with increased resistance to 1.7 level without complaints of fatigue and with abilities to maintain excellent pacing throughout.  Pt demo with G understanding of all sleep hygiene strategies and plans to begin to implement to improve overall sleep quality and quantity. Pt demo with Max difficulties retrieving and placing dowels in dowel board with use of mirror for visual feedback with tendencies of looking down at hand to improve overall proprioception/kinesthetic awareness of LUE-- Pt with large dysmetria with attempts to place dowel in dowel board.  Pt presented with frequent droppage of dowels throughout.  Pt presented with slight improvements as activity persisted. Pt demo with ataxia affecting functional use of L hand when integrating it into tying/untying knots- Pt required use of visual system to improve overall motor control of LUE.    Plan: Continue per plan of care.      INTERVENTION COMMENTS:  Diagnosis: Cerebrovascular accident (CVA), unspecified mechanism (HCC) [I63.9]  Precautions: fall risk  Insurance: Payor: ORLIN ANDRE REP / Plan: [employer]  ADVANTRA / Product Type: Medicare HMO /   3 of 10 visits, PN due 06/30/2024

## 2024-06-12 NOTE — PROGRESS NOTES
"PHYSICAL THERAPY TREATMENT     Date: 24  Name: Sanam Castro  : 1955  Referring Provider: Dia Redman MD  AUTHORIZATION:   Insurance: Payor: ORLIN  REP / Plan: [employer]ARE ADVANTRA / Product Type: Medicare HMO /     SUBJECTIVE:  HPI: Sanam Castro is a 68 y.o. female referred to outpatient physical therapy for the following diagnosis   Encounter Diagnosis   Name Primary?    Cerebrovascular accident (CVA), unspecified mechanism (HCC) Yes       Present with daughter-in-law Jannet.  Under the weather with GI issues last week, feeling better now.      Patient-Specific Functional Scale   Task is scored 0 (unable to perform activity) to 10 (ability to perform activity independently)  Activity     Get back to being active.     2.   Not being reliant on the walker.     3.   Return to driving.         OBJECTIVE:    Precautions - fall risk    Specialty Treatment Diary  24     Home exercise program  /70 /82 Sit to stand   Walk in home with rollingwalker     (Ther-ex, neuromuscular re-ed)Walking/endurance         (Neuromuscular re-ed) Static and dynamic balance/anticipatory / reactive Outcomes below     Blaze pod reactive balance  6 targets  In solostep  12' radius on floor  1 min x 2  Then + carry 9 lb water weight  1 min x 3    Then similar foam incline  1 min x 3 sets    Fast walking 75 feet down and back, timed  42 sec  39 sec  37 sec  40 sec   43 sec    Walk and catch ball all planes, Then similar with change of direction forwards to back, and turning 180 degrees with ball catch 2.5 min   Blaze pod reactive balance in solostep  6 targets on ground over 20'  1 min x 5 sets    Same with foam incline  6 targets  1 min x 3 sets    Then 20' line   6 targets  4\" step  Foam incline  1 min x 4 sets    Passing soccer ball off walls   3.5 min x 2 sets    Resisted walking 20 lbs 20 feet down and back  About 3 min  Same laterally about 4 min    Walk and catch ball " "all planes, 3 min  Then similar with change of direction forwards to back, and turning 180 degrees with ball catch 3.5 min    Treadmill 6 minutes at 0.6 mph      Overground walking in soloste throughout  2 min no AD  100 feet as fast as possible   X 5 sets best 25 seconds  Then carry 5 lbs on bungee x 2-3 sets  Attempted pull 30 lbs, back pain, discontinued  Then 100 feet with head turns all planes about 5 sets  Then 360 degree turns 4 min  Then passing soccer ball 3.5 min  Then turning to catch bounced ball, 3.5 min   RPS 6/10  Blaze pod reactive balance 18' line in soloste 45 sec x 4 sets   Treadmill 0.5 - 0.8 mph 45 sec - 1 min x 4-5 sets      (Ther-ex) Strengthening         (There-ex) Stretching                       Vitals     Blood pressure (resting, left arm unless noted) 108/70    Heart rate (resting) 69 bpm  99% SpO2       Oculomotor & Coordination     Smooth pursuit & conjugate gaze Normal    Fingertip to nose & rapidly alternating hand movements Discoordination with left alternating hand tapping  Full left shoulder elevation, full elbow extension, wrist and finger flexion and extension  Dysmetria left fingertip to nose     Roughly 4+/5 right shoulder abduction, elbow flexion and extension, wrist extension, 5/5 right  5/5 hip flexion, knee extension, dorsiflexion bilaterally  Denies paresthesias.     Static Balance     Romberg Normal, able to maintain balance but increased postural sway      Mobility Measures 6/12/24 5/22/24   5 Time Sit to Stand  (17\" chair, arms across chest) 18.2 seconds 14.7 seconds   3 Meter Timed  Up & Go 7.4 seconds 8.9 seconds   Walking speed (self-selected)     Functional Gait Assessment (see below) 19/30 12/30   6 Minute Walk Test (100 foot circular course)  1300 feet    Ending heart rate 90s bpm   Patient-Reported Outcome Measure: Activities-Specific Balance Confidence Scale (ABC Scale)  49%     Core Movement Tasks Description of task    Sitting Normal   Sitting to Standing " Normal   Standing Normal   Walking Relatively even step lengths, mild increase in double limb support; moderate decrease in left arm swing, arm posturing in mild scaption with minimal elbow excursion   Step-up Using railings, step-to gait   Reach, grasp, manipulate See above     Functional Gait Assessment  2/3 Gait level surface  2/3 Change in gait speed  2/3 Gait with horizontal head turns  2/3 Gait with vertical head turns  2/3 Gait and pivot turn  1/3 Step over obstacle  0/3 Gait with narrow base of support  2/3 Gait with eyes closed  2/3 Ambulating backwards  1/3 Steps - not tested inferred  16/30 Total score (less than 22/30 indicates increased risk of fall)..      ASSESSMENT:  Jaimie is a 68 year old female with mobility limitations following CVA 5/04/24.  She will followed with occupational therapy evaluation today.    Good improvement with FGA.  We did all exercises in therapy without use of the cane.  Retest FGA next week.    Retest 6 minute walk test.      Balance confidence is a limiting factor here but willing to challenge herself appropriately.      SHORT-TERM GOALS: by 6/05/24  1. Patient scores at least 16/30 on FGA.  2. Patient walks at least 1500 feet during 6 minute walk test.  3. Patient safely walks within the home with a cane.    LONG-TERM GOALS: by 7/22/24   1. Patient returns to prior level of community walking.  2. Patient scores at least 23/30 on FGA for safe mobility without assistive device.    PLAN OF CARE:  Patient will benefit from physical therapy 2 times per week for 2 months  Neuromuscular re-education, therapeutic exercises, and therapeutic activities as outlined in grids.    Gerald Daniels, PT  6/12/2024

## 2024-06-14 ENCOUNTER — TELEPHONE (OUTPATIENT)
Dept: NEUROSURGERY | Facility: CLINIC | Age: 69
End: 2024-06-14

## 2024-06-14 NOTE — TELEPHONE ENCOUNTER
Received call on nurseline from patient's [employer] reporting Sanam had PT this morning. She states the PT recommends the patient has visual field testing completed and is requesting that we place the order. Patient saw JOSE GREWAL in clinic on 5/29. Will route to provider bin for input.

## 2024-06-14 NOTE — TELEPHONE ENCOUNTER
"Called and made [employer] aware of provider BRETT GONCALVES's response.  \"She was seen by Nayana for incidental frontal meningioma, which has nothing to do with visual fields. Maybe PT should express concerns to neurology or PCP. \"  All questions answered at this time, she was appreciative of the assistance,  "

## 2024-06-18 ENCOUNTER — OFFICE VISIT (OUTPATIENT)
Facility: REHABILITATION | Age: 69
End: 2024-06-18
Payer: COMMERCIAL

## 2024-06-18 ENCOUNTER — OFFICE VISIT (OUTPATIENT)
Dept: OCCUPATIONAL THERAPY | Facility: REHABILITATION | Age: 69
End: 2024-06-18
Payer: COMMERCIAL

## 2024-06-18 DIAGNOSIS — I63.9 CEREBROVASCULAR ACCIDENT (CVA), UNSPECIFIED MECHANISM (HCC): Primary | ICD-10-CM

## 2024-06-18 PROCEDURE — 97530 THERAPEUTIC ACTIVITIES: CPT | Performed by: PHYSICAL THERAPIST

## 2024-06-18 PROCEDURE — 97110 THERAPEUTIC EXERCISES: CPT | Performed by: OCCUPATIONAL THERAPIST

## 2024-06-18 PROCEDURE — 97530 THERAPEUTIC ACTIVITIES: CPT | Performed by: OCCUPATIONAL THERAPIST

## 2024-06-18 NOTE — PROGRESS NOTES
"Daily Note     Today's date: 2024  Patient name: Sanam Castro  : 1955  MRN: [id number]  Referring provider: Dia Redman MD  Dx:   Encounter Diagnosis     ICD-10-CM    1. Cerebrovascular accident (CVA), unspecified mechanism (HCC)  I63.9                      Subjective: \"I am doing better at holding a plate of food with both hands at home.\"      Objective: See treatment description below    Thera Ex: UBE x 5 minutes prograde and x 5 minutes retrograde in seated position bilaterally with increased resistance to 1.8 level throughout focusing on improved LUE strength, endurance training, and improved gross motor control of LUE.     Thera Act: Pt transitioned to performing functional reach with midline crossing for peg retrieval and placement in peg board placed on elevated wedge when positioned in quadruped position alternating between RUE and LUE focusing on improved LUE fine motor and gross motor control, increased LUE proprioception/kinesthetic awareness, and hand to target precision.    Pt concluded tx session with dowel retrieval from table top utilizing composite grasp to calibrated hand gripper at level #2 resistance for placement in dowel board placed on another table 4 feet away requiring Pt to sustain grasp with functional ambulation focusing on improved L distal strength/endurance, improved proprioceptive feedback, improved hand to target precision, and improved overall LUE gross motor and fine motor control.         Assessment: Tolerated treatment well. Patient would benefit from continued OT    Pt demo with G activity tolerance to UBE demands with abilities to complete x 10 minutes with increased resistance to 1.8 level without complaints of fatigue and with abilities to maintain excellent pacing throughout.  Pt did complain of mild lower back pain towards end of 10 minutes. Pt demo with G activity tolerance to sole weight bearing to LUE while RUE performing peg retrieval with G " proximal stability and endurance evident.  Pt did present with slight ataxia throughout when performing functional reach and placement of peg in peg board resulting in dysmetria throughout. Pt demo with difficulties sustaining grasp to calibrated hand gripper with added distraction of walking 4 feet to dowel board with 15 droppage evident.  Pt presented with G response to placing more visual attention to L hand when grasping dowel initially to acquire better grasp.  Pt did present with decreased ataxia throughout improving hand to target accuracy for placement of dowel in dowel board with less dysmetria presented.    Plan: Continue per plan of care.      INTERVENTION COMMENTS:  Diagnosis: Cerebrovascular accident (CVA), unspecified mechanism (HCC) [I63.9]  Precautions: fall risk  Insurance: Payor: [employer]LIN  REP / Plan: [employer] MEDICARE ADVANTRA / Product Type: Medicare HMO /   4 of 10 visits, PN due 06/30/2024

## 2024-06-18 NOTE — PROGRESS NOTES
"PHYSICAL THERAPY TREATMENT     Date: 24  Name: Sanam Castro  : 1955  Referring Provider: Dia Redman MD  AUTHORIZATION:   Insurance: Payor: ORLIN  REP / Plan: [employer] MEDICARE ADVANTRA / Product Type: Medicare HMO /     SUBJECTIVE:  HPI: Sanam Castro is a 68 y.o. female referred to outpatient physical therapy for the following diagnosis   Encounter Diagnosis   Name Primary?    Cerebrovascular accident (CVA), unspecified mechanism (HCC) Yes       Present with daughter-in-law Jannet.  Walking with no assistive device at home, with walker outside.  Danced to [employer] at son's 38th birthday party.    Patient-Specific Functional Scale   Task is scored 0 (unable to perform activity) to 10 (ability to perform activity independently)  Activity     Get back to being active.     2.   Not being reliant on the walker.     3.   Return to driving.         OBJECTIVE:    Precautions - fall risk    Specialty Treatment Diary  24     Home exercise program   /70 /82 Sit to stand   Walk in home with rollingwalker     (Ther-ex, neuromuscular re-ed)Walking/endurance          (Neuromuscular re-ed) Static and dynamic balance/anticipatory / reactive FGA  20/30    Fast walking 75 feet down and back, timed  36 sec  31 sec  29.4 sec  28.6 sec    Walking collecting cones from behind, requiring turning head and looking back while walking  About 150 feet x 3  Walking and catching bounced ball, 150 feet x 3    Step up foam incline  Tap blaze pods  6 targets  45 sec x 3 sets  1 set carry 9 lb water weight    Passing soccer ball walking back and forth 3 min    Blaze pods in 6-12\" obstacle course  1 min x 4 sets       Outcomes below     Blaze pod reactive balance  6 targets  In solostep  12' radius on floor  1 min x 2  Then + carry 9 lb water weight  1 min x 3    Then similar foam incline  1 min x 3 sets    Fast walking 75 feet down and back, timed  42 sec  39 sec  37 sec  40 sec " "  43 sec    Walk and catch ball all planes, Then similar with change of direction forwards to back, and turning 180 degrees with ball catch 2.5 min   Blaze pod reactive balance in [facility]  6 targets on ground over 20'  1 min x 5 sets    Same with foam incline  6 targets  1 min x 3 sets    Then 20' line   6 targets  4\" step  Foam incline  1 min x 4 sets    Passing soccer ball off walls   3.5 min x 2 sets    Resisted walking 20 lbs 20 feet down and back  About 3 min  Same laterally about 4 min    Walk and catch ball all planes, 3 min  Then similar with change of direction forwards to back, and turning 180 degrees with ball catch 3.5 min    Treadmill 6 minutes at 0.6 mph      Overground walking in [facility] throughout  2 min no AD  100 feet as fast as possible   X 5 sets best 25 seconds  Then carry 5 lbs on bungee x 2-3 sets  Attempted pull 30 lbs, back pain, discontinued  Then 100 feet with head turns all planes about 5 sets  Then 360 degree turns 4 min  Then passing soccer ball 3.5 min  Then turning to catch bounced ball, 3.5 min   RPS 6/10  Blaze pod reactive balance 18' line in [facility] 45 sec x 4 sets   Treadmill 0.5 - 0.8 mph 45 sec - 1 min x 4-5 sets      (Ther-ex) Strengthening          (There-ex) Stretching                         Vitals     Blood pressure (resting, left arm unless noted) 108/70    Heart rate (resting) 69 bpm  99% SpO2       Oculomotor & Coordination     Smooth pursuit & conjugate gaze Normal    Fingertip to nose & rapidly alternating hand movements Discoordination with left alternating hand tapping  Full left shoulder elevation, full elbow extension, wrist and finger flexion and extension  Dysmetria left fingertip to nose     Roughly 4+/5 right shoulder abduction, elbow flexion and extension, wrist extension, 5/5 right  5/5 hip flexion, knee extension, dorsiflexion bilaterally  Denies paresthesias.     Static Balance     Romberg Normal, able to maintain balance but increased postural sway    " "  Mobility Measures 6/12/24 5/22/24   5 Time Sit to Stand  (17\" chair, arms across chest) 18.2 seconds 14.7 seconds   3 Meter Timed  Up & Go 7.4 seconds 8.9 seconds   Walking speed (self-selected)     Functional Gait Assessment (see below) 19/30 12/30   6 Minute Walk Test (100 foot circular course)  1300 feet    Ending heart rate 90s bpm   Patient-Reported Outcome Measure: Activities-Specific Balance Confidence Scale (ABC Scale)  49%     Core Movement Tasks Description of task    Sitting Normal   Sitting to Standing Normal   Standing Normal   Walking Relatively even step lengths, mild increase in double limb support; moderate decrease in left arm swing, arm posturing in mild scaption with minimal elbow excursion   Step-up Using railings, step-to gait   Reach, grasp, manipulate See above     Functional Gait Assessment  2/3 Gait level surface  2/3 Change in gait speed  2/3 Gait with horizontal head turns  2/3 Gait with vertical head turns  2/3 Gait and pivot turn  1/3 Step over obstacle  0/3 Gait with narrow base of support  2/3 Gait with eyes closed  2/3 Ambulating backwards  1/3 Steps - not tested inferred  16/30 Total score (less than 22/30 indicates increased risk of fall)..      ASSESSMENT:  Jaimie is a 68 year old female with mobility limitations following CVA 5/04/24.  She will followed with occupational therapy evaluation today.    Good further improvement with FGA.  We'd like to see slightly more improvement to recommend community ambulation without assistive device.    Retest 6 minute walk test.      Balance confidence is a limiting factor here but willing to challenge herself appropriately.      SHORT-TERM GOALS: by 6/05/24  1. Patient scores at least 16/30 on FGA.  MET.  2. Patient walks at least 1500 feet during 6 minute walk test.  3. Patient safely walks within the home with a cane.  MET.    LONG-TERM GOALS: by 7/22/24   1. Patient returns to prior level of community walking.  2. Patient scores at least " 23/30 on FGA for safe mobility without assistive device.    PLAN OF CARE:  Patient will benefit from physical therapy 2 times per week for 2 months  Neuromuscular re-education, therapeutic exercises, and therapeutic activities as outlined in grids.    Gerald Daniels, PT  6/18/2024

## 2024-06-20 ENCOUNTER — OFFICE VISIT (OUTPATIENT)
Dept: OCCUPATIONAL THERAPY | Facility: REHABILITATION | Age: 69
End: 2024-06-20
Payer: COMMERCIAL

## 2024-06-20 ENCOUNTER — OFFICE VISIT (OUTPATIENT)
Facility: REHABILITATION | Age: 69
End: 2024-06-20
Payer: COMMERCIAL

## 2024-06-20 DIAGNOSIS — I63.9 CEREBROVASCULAR ACCIDENT (CVA), UNSPECIFIED MECHANISM (HCC): Primary | ICD-10-CM

## 2024-06-20 PROCEDURE — 97530 THERAPEUTIC ACTIVITIES: CPT | Performed by: PHYSICAL THERAPIST

## 2024-06-20 PROCEDURE — 97530 THERAPEUTIC ACTIVITIES: CPT | Performed by: OCCUPATIONAL THERAPIST

## 2024-06-20 PROCEDURE — 97110 THERAPEUTIC EXERCISES: CPT | Performed by: OCCUPATIONAL THERAPIST

## 2024-06-20 NOTE — PROGRESS NOTES
"Daily Note     Today's date: 2024  Patient name: Sanam Castro  : 1955  MRN: [id number]  Referring provider: Dia Redman MD  Dx:   Encounter Diagnosis     ICD-10-CM    1. Cerebrovascular accident (CVA), unspecified mechanism (HCC)  I63.9                      Subjective: \"I am doing better at holding a plate of food with both hands at home.\"      Objective: See treatment description below    Thera Ex: UBE x 3 minutes prograde and x 3 minutes retrograde in seated position bilaterally with increased resistance to 1.9 level throughout focusing on improved LUE strength, endurance training, and improved gross motor control of LUE.     Thera Act: Pt transitioned to performing in stance functional reach with midline crossing for small block retrieval and placement in central portion of circles drawn on white board in all planes and with #1 wrist weight donned to L wrist focusing on improved gross motor and fine motor control of LUE, improved hand to target precision, and improved kinesthetic awareness.  Pt then advanced to removal of small blocks from white board utilizing 3-jaw juancarlos grasp to green resistive clothes pin to continue to challenge hand to target precision and gross motor control and to focus on L intrinsic/distal strength.     Pt concluded tx session with bilateral integrative task of placement of weaving loom material on weaving loom board placed at eye level in vertical surface, followed by advancing to weaving material in between previously placed material (under/over) to improve bilateral coordination/bimanual dexterity, improved L FMC/prehension speed and accuracy, and improved gross motor control/steadiness of LUE.    Assessment: Tolerated treatment well. Patient would benefit from continued OT    Pt demo with fair activity tolerance to UBE demands with abilities to complete x 10 minutes with increased resistance to 1.9 level with minimal complaints of fatigue and with abilities to " maintain G pacing throughout.  Pt arrived to tx session with significant increased fatigue levels 2* inabilities to fall asleep in evening hours.  Pt was recommended to take 10 mg of Melatonin and Calm Tea to assist with sleep quality/quantity.  Pt demo with ongoing LUE ataxia affecting hand to target precision with dysmetria evident throughout.  Pt presented with Max difficulties placing block in central portion of [place] with requirement to fix frequently.  Pt also presented with 5 droppage when placing small blocks on white board and x 3 droppage when removing small blocks with use of 3-jaw grasp to green resistive clothes pin.  Pt performs bimanual dexterity and bilateral coordination activities at slowed pace 2* increased visual attention to LUE required throughout-- Pt presents with ongoing motor delays from LUE due to both ataxia and decreased coordination.     Plan: Continue per plan of care.      INTERVENTION COMMENTS:  Diagnosis: Cerebrovascular accident (CVA), unspecified mechanism (HCC) [I63.9]  Precautions: fall risk  Insurance: Payor: ORLIN  REP / Plan: [employer] MEDICARE ADVANTRA / Product Type: Medicare HMO /   5 of 10 visits, PN due 06/30/2024

## 2024-06-20 NOTE — PROGRESS NOTES
"PHYSICAL THERAPY TREATMENT / UPDATE    Date: 24  Name: Sanam Castro  : 1955  Referring Provider: Dia Redman MD  AUTHORIZATION:   Insurance: Payor: ORLIN  REP / Plan: [employer] MEDICARE ADVANTRA / Product Type: Medicare HMO /     SUBJECTIVE:  HPI: Sanam Castro is a 68 y.o. female referred to outpatient physical therapy for the following diagnosis   Encounter Diagnosis   Name Primary?    Cerebrovascular accident (CVA), unspecified mechanism (HCC) Yes       Feels tired today.    Practiced walking stairs.  Walking without walker at home and in children's home.  Spoke with physician about difficulty sleeping.  Reports history of difficulty sleeping.   Seen by eye doctor, lens changed very slightly.      Patient-Specific Functional Scale   Task is scored 0 (unable to perform activity) to 10 (ability to perform activity independently)  Activity     Get back to being active.     2.   Not being reliant on the walker.     3.   Return to driving.         OBJECTIVE:    Precautions - fall risk    Specialty Treatment Diary  24   Home exercise program      (Ther-ex, neuromuscular re-ed)Walking/endurance      (Neuromuscular re-ed) Static and dynamic balance/anticipatory / reactive /84  67 bpm 99% SpO2    Testing above/below    step up foam incline  Tap blaze pods  6 targets  60 sec x 3 sets    Blaze pods   8\" step   Foam incline  On and over airex foam  1 min x 3 sets    Blaze pods   6 targets   Lateral steps over 6-12\" objects  1 min x 3 sets     FGA  20/30    Fast walking 75 feet down and back, timed  36 sec  31 sec  29.4 sec  28.6 sec    Walking collecting cones from behind, requiring turning head and looking back while walking  About 150 feet x 3  Walking and catching bounced ball, 150 feet x 3    Step up foam incline  Tap blaze pods  6 targets  45 sec x 3 sets  1 set carry 9 lb water weight    Passing soccer ball walking back and forth 3 min    Blaze pods in 6-12\" obstacle " "course  1 min x 4 sets       Outcomes below     Blaze pod reactive balance  6 targets  In solostep  12' radius on floor  1 min x 2  Then + carry 9 lb water weight  1 min x 3    Then similar foam incline  1 min x 3 sets    Fast walking 75 feet down and back, timed  42 sec  39 sec  37 sec  40 sec   43 sec    Walk and catch ball all planes, Then similar with change of direction forwards to back, and turning 180 degrees with ball catch 2.5 min     (Ther-ex) Strengthening      (There-ex) Stretching                   Mobility Measures 6/20/24 6/12/24 5/22/24   5 Time Sit to Stand  (17\" chair, arms across chest) 20.1 seconds 18.2 seconds 14.7 seconds   3 Meter Timed  Up & Go  7.4 seconds 8.9 seconds   Walking speed (self-selected)      Functional Gait Assessment (see below) 18/30 19/30 12/30   6 Minute Walk Test (100 foot circular course) Completed without assistive device in solostep    1116 feet no falls or loss of balance, mild foot scuffing 5 minutes into walking  87 bpm 97% SpO2      1300 feet with rolling walker    Ending heart rate 90s bpm   Patient-Reported Outcome Measure: Activities-Specific Balance Confidence Scale (ABC Scale) 89%  49%     ASSESSMENT:  Jaimie is a 68 year old female with mobility limitations following CVA 5/04/24.      Good improvement in dynamic balance since beginning physical therapy, has scored in 18-20/30 range.  We would like to see scores in the 22-23/30 range to feel more comfortable with community mobility.      She's walked faster during a 6 minute walk test than today. Retest next week.     Good improvement in balance confidence, continue to work to improve capacity.          SHORT-TERM GOALS: by 6/05/24, continued to 7/08/24  1. Patient scores at least 16/30 on FGA.  MET.  2. Patient walks at least 1500 feet during 6 minute walk test. NOT MET.  3. Patient safely walks within the home with a cane.  MET.    LONG-TERM GOALS: by 7/22/24   1. Patient returns to prior level of community " walking.  2. Patient scores at least 23/30 on FGA for safe mobility without assistive device.    PLAN OF CARE:  Patient will benefit from physical therapy 2 times per week for 1 month  Neuromuscular re-education, therapeutic exercises, and therapeutic activities as outlined in grids.    Gerald Daniels, PT  6/20/2024

## 2024-06-24 ENCOUNTER — TELEPHONE (OUTPATIENT)
Dept: NEUROLOGY | Facility: CLINIC | Age: 69
End: 2024-06-24

## 2024-06-25 ENCOUNTER — OFFICE VISIT (OUTPATIENT)
Facility: REHABILITATION | Age: 69
End: 2024-06-25
Payer: COMMERCIAL

## 2024-06-25 ENCOUNTER — OFFICE VISIT (OUTPATIENT)
Dept: OCCUPATIONAL THERAPY | Facility: REHABILITATION | Age: 69
End: 2024-06-25
Payer: COMMERCIAL

## 2024-06-25 DIAGNOSIS — I63.9 CEREBROVASCULAR ACCIDENT (CVA), UNSPECIFIED MECHANISM (HCC): Primary | ICD-10-CM

## 2024-06-25 PROCEDURE — 97530 THERAPEUTIC ACTIVITIES: CPT | Performed by: PHYSICAL THERAPIST

## 2024-06-25 PROCEDURE — 97530 THERAPEUTIC ACTIVITIES: CPT | Performed by: OCCUPATIONAL THERAPIST

## 2024-06-25 NOTE — PROGRESS NOTES
Daily Note     Today's date: 2024  Patient name: Sanam Castro  : 1955  MRN: [id number]  Referring provider: Dia Redman MD  Dx:   Encounter Diagnosis     ICD-10-CM    1. Cerebrovascular accident (CVA), unspecified mechanism (HCC)  I63.9                      Subjective: Pt attended orthopedic appt yesterday 2* ongoing L shoulder pain 2* fall when having a stroke.  Pt underwent an X-ray with the following indications: Possible L acromioclavicular joint separation.  Pt with be referred to an orthopedic surgeon and recommending to undergo an MRI for further evaluation and imagine.       Objective: See treatment description below    Thera Act: Pt began tx session with seated functional reach with midline crossing for peg retrieval and placement in peg board placed on elevated surface after performing in-hand manipulation demands of palm to finger translation, followed by removal of pegs from peg board utilizing lumbrical grasp to tennis ball with small opening focusing on improved speed and accuracy of L MC/prehension/in-hand manipulation abilities, improved hand to target precision, and improved overall gross motor and fine motor coordination.     Pt advanced to performing removal of Velcro checkers from Velcro board utilizing 3-jaw juancarlos pinch with L hand, followed by foam block retrieval and placement on central portion of each  square utilizing 3-jaw juancarlos pinch to blue or green resistive clothes pin to challenge L intrinsic/distal strength/endurance, improved FMC/prehension, and improved hand to target precision/gross motor coordination.  Pt concluded functional task with removal of foam blocks from board utilizing finger to palm translation x 5 at a time and placement of Velcro  back on board to improve speed and accuracy of in-hand manipulation and hand to target precision.      Assessment: Tolerated treatment well. Patient would benefit from continued OT    Pt with self-report  of noticing ongoing sleep difficulties affecting overall energy levels.  Pt with self-report of noticing significant increased fatigue levels on this date.  Pt with self-report of noticing improved abilities making sandwiches and simple meal prepping.  Pt demo with initial evident ataxia resulting in dysmetria affecting hand to target demands of placing peg in peg board placed on elevated surface, though significant improved gross motor coordination presented as activity persisted.  Pt demo with G abilities removing Velcro checkers from Velcro board utilizing L 3-jaw juancarlos grasp with G speed and accuracy.  Pt demo with slight difficulties sustaining 3-jaw juancarlos grasp to blue resistive clothes pin with requirement to decrease to green resistive clothes after completing 50% of foam blocks.  Pt demo with slight difficulties placing foam blocks in center portion of square on  board 2* ataxia resulting in slight dysmetria.  Pt demo with excellent speed and accuracy of translating blocks from finger to palms x 5 with % droppage.      Plan: Continue per plan of care.      INTERVENTION COMMENTS:  Diagnosis: Cerebrovascular accident (CVA), unspecified mechanism (HCC) [I63.9]  Precautions: fall risk  Insurance: Payor: ORLIN ANDRE REP / Plan: [employer] MEDICARE ADVANTRA / Product Type: Medicare HMO /   6 of 10 visits, PN due 06/30/2024

## 2024-06-25 NOTE — PROGRESS NOTES
"PHYSICAL THERAPY TREATMENT / UPDATE    Date: 24  Name: Sanam Castro  : 1955  Referring Provider: Dia Redman MD  AUTHORIZATION:   Insurance: Payor: [employer]LIN  REP / Plan: [employer] MEDICARE ADVANTRA / Product Type: Medicare HMO /     SUBJECTIVE:  HPI: Sanam Castro is a 68 y.o. female referred to outpatient physical therapy for the following diagnosis   Encounter Diagnosis   Name Primary?    Cerebrovascular accident (CVA), unspecified mechanism (HCC) Yes       Feels tired today.    Practiced walking stairs.  Walking without walker at home and in children's home.  Spoke with physician about difficulty sleeping.  Reports history of difficulty sleeping.   Seen by eye doctor, lens changed very slightly.      Patient-Specific Functional Scale   Task is scored 0 (unable to perform activity) to 10 (ability to perform activity independently)  Activity     Get back to being active.     2.   Not being reliant on the walker.     3.   Return to driving.         OBJECTIVE:    Precautions - fall risk    Specialty Treatment Diary  24   Home exercise program       (Ther-ex, neuromuscular re-ed)Walking/endurance       (Neuromuscular re-ed) Static and dynamic balance/anticipatory / reactive /82    Testing above/below    Resisted walking pulling 10 lbs 150 feet x 3 set  Fast walking 150 feet x 4 sets  Best 34.1 sec (down and back)    Blaze pods 6 targets  In solostep  Foam incline  12\" obstacle  4\" step   1 min x 3 sets    Blaze pods 6 targets  In solostep  2\" foam mat with 2 x 8\" foam underneath randomly  Foam incline  1 min x 3 sets    Batting 8\" ball with tennis racquet, multidirection stepping  4 min    Passing soccer ball back and forth, multidirection stepping about 2.5 min /84  67 bpm 99% SpO2    Testing above/below    step up foam incline  Tap blaze pods  6 targets  60 sec x 3 sets    Blaze pods   8\" step   Foam incline  On and over airex foam  1 min x 3 " "sets    Blaze pods   6 targets   Lateral steps over 6-12\" objects  1 min x 3 sets     FGA  20/30    Fast walking 75 feet down and back, timed  36 sec  31 sec  29.4 sec  28.6 sec    Walking collecting cones from behind, requiring turning head and looking back while walking  About 150 feet x 3  Walking and catching bounced ball, 150 feet x 3    Step up foam incline  Tap blaze pods  6 targets  45 sec x 3 sets  1 set carry 9 lb water weight    Passing soccer ball walking back and forth 3 min    Blaze pods in 6-12\" obstacle course  1 min x 4 sets       Outcomes below     Blaze pod reactive balance  6 targets  In [facility]  12' radius on floor  1 min x 2  Then + carry 9 lb water weight  1 min x 3    Then similar foam incline  1 min x 3 sets    Fast walking 75 feet down and back, timed  42 sec  39 sec  37 sec  40 sec   43 sec    Walk and catch ball all planes, Then similar with change of direction forwards to back, and turning 180 degrees with ball catch 2.5 min     (Ther-ex) Strengthening       (There-ex) Stretching                     Mobility Measures 6/25/24 6/20/24 6/12/24 5/22/24   5 Time Sit to Stand  (17\" chair, arms across chest) 16.3 seconds 20.1 seconds 18.2 seconds 14.7 seconds   3 Meter Timed  Up & Go   7.4 seconds 8.9 seconds   Walking speed (self-selected)       Functional Gait Assessment (see below) 22/30 18/30 19/30 12/30   6 Minute Walk Test (100 foot circular course) Completed without assistive device with close supervision only  1160 feet no falls no foot scuffing Completed without assistive device in [facility]    1116 feet no falls or loss of balance, mild foot scuffing 5 minutes into walking  87 bpm 97% SpO2      1300 feet with rolling walker    Ending heart rate 90s bpm   Patient-Reported Outcome Measure: Activities-Specific Balance Confidence Scale (ABC Scale)  89%  49%   Functional Gait Assessment  3/3 Gait level surface  3/3 Change in gait speed  2/3 Gait with horizontal head turns  1/3 Gait with " vertical head turns  3/3 Gait and pivot turn  3/3 Step over obstacle  2/3 Gait with narrow base of support  3/3 Gait with eyes closed  1/3 Ambulating backwards  1/3 Steps  22/30 Total score (less than 22/30 indicates increased risk of fall)..    ASSESSMENT:  Jaimie is a 68 year old female with mobility limitations following CVA 5/04/24.      Good improvement in dynamic gait index, this better reflects current improvement in performance.  We should move away from the rolling walker inside the home, except as needed when tired.        SHORT-TERM GOALS: by 6/05/24, continued to 7/08/24  1. Patient scores at least 16/30 on FGA.  MET.  2. Patient walks at least 1500 feet during 6 minute walk test. NOT MET.  3. Patient safely walks within the home with a cane.  MET.    LONG-TERM GOALS: by 7/22/24   1. Patient returns to prior level of community walking.  2. Patient scores at least 23/30 on FGA for safe mobility without assistive device.    PLAN OF CARE:  Patient will benefit from physical therapy 2 times per week for 1 month  Neuromuscular re-education, therapeutic exercises, and therapeutic activities as outlined in grids.    Gerald Daniels, PT  6/25/2024

## 2024-06-27 ENCOUNTER — OFFICE VISIT (OUTPATIENT)
Facility: REHABILITATION | Age: 69
End: 2024-06-27
Payer: COMMERCIAL

## 2024-06-27 ENCOUNTER — EVALUATION (OUTPATIENT)
Dept: OCCUPATIONAL THERAPY | Facility: REHABILITATION | Age: 69
End: 2024-06-27
Payer: COMMERCIAL

## 2024-06-27 DIAGNOSIS — I63.9 CEREBROVASCULAR ACCIDENT (CVA), UNSPECIFIED MECHANISM (HCC): Primary | ICD-10-CM

## 2024-06-27 PROCEDURE — 97112 NEUROMUSCULAR REEDUCATION: CPT | Performed by: OCCUPATIONAL THERAPIST

## 2024-06-27 PROCEDURE — 97530 THERAPEUTIC ACTIVITIES: CPT | Performed by: PHYSICAL THERAPIST

## 2024-06-27 PROCEDURE — 97110 THERAPEUTIC EXERCISES: CPT | Performed by: PHYSICAL THERAPIST

## 2024-06-27 NOTE — PROGRESS NOTES
OCCUPATIONAL THERAPY PROGRESS NOTE #1:    06/27/2024  Sanam MORGAN Gloria  1955  [id number]  Dia Redman MD   Diagnosis ICD-10-CM Associated Orders   1. Cerebrovascular accident (CVA), unspecified mechanism (HCC)  I63.9             Assessment    Assessment details: See skilled analysis for further details.         Skilled Analysis:  Pt is a 68 y.o. female referred to Occupational Therapy s/p Cerebrovascular accident (CVA), unspecified mechanism (HCC) [I63.9].   Pt participated in skilled OT evaluation and following formalized testing as well as clinical observation, Pt presents with the following areas of deficit:  LUE ataxia affecting gross motor and fine motor coordination, bilateral coordination/integration of LUE, hand to target precision with dysmetria evident, and difficulties with performing IADLs.  Pt also presented with impaired LUE proprioception/kinesthetic awareness and decreased speed and accuracy with FMC/prehension/in-hand manipulation abilities.  Pt will benefit from skilled Occupational Therapy services 2x/week for 6-8 weeks with focus on neuro re-ed, thera act, thera ex, self-care management to improve on the above deficits, improve functional use of LUE, and enhance overall QOL/independence.     Upon this date (06/27/2024), Pt participated in re-evaluation to further assess fxnl progression towards Occupational Therapy goals. Pt demo with G functional progression towards goals in POC evident by increased L intrinsic/distal strength/endurance, increased speed and accuracy of L functional opposition, significant improved speed and accuracy of L FMC/prehension, and significant improved abilities with simple meal prepping and making sandwiches with increased PSFS level to 9/10.  Following formalized testing and clinical observation, Pt continues to present with the following limitation: LUE ataxia affecting gross motor coordination/hand to target precision, impaired LUE proprioception with and  without vision occluded, decreased LUE kinesthetic awareness, bilateral coordination/integration of LUE, dysmetria with hand to target demands, and decreased in-hand manipulation of L hand.  Pt with self-report of continuing to notice the following difficulties: placing deodrant on when utilizing LUE 2* dysmetria and ataxia affecting hand to target precision.  OTR recommending Pt to continue skilled OT services 2x/week for 4-6 more weeks focusing on improving the above deficits, increased functional use of LUE, and enhance overall QOL.        Short Term Goals:  Pt will increase proprioception of LUE hand to target for improved functional reach vision occluded with ADL/IADL tasks  x 50% accuracy 4 weeks- PARTIALLY MET  Pt will increase LUE rate of manipulation for all FM tests x 10 seconds for improved functional performance/independence with salient tasks/IADLs 4 weeks-- MET  Pt will demo with decreased LUE ataxia with functional reach for normalized movement pattern of  L UE and for improved hand to target accuracy x 50% 4 weeks-- PARTIALLY MET  Pt will increase L UE  strength to #48 through the use of strengthening exercises and home program for eventual return to completion of simple meal prepping with increased independence 4 weeks-- MET  Pt will demo with improved bilateral coordination for completion of simple meal prepping activities for an increased PSFS level to 9/10 4 weeks-- MET      Long Term Goals:  Pt will increase proprioception of LUE hand to target for improved functional reach vision occluded with ADL/IADL tasks  x 85% accuracy-- NOT MET   Pt will increase LUE rate of manipulation for all FM tests x 15 seconds for improved functional performance/independence with salient tasks/IADLs-- MET   Pt will demo with decreased LUE ataxia with functional reach for normalized movement pattern of  L UE and for improved hand to target accuracy x 85%--NOT MET   Pt will increase L UE  strength to #50  "through the use of strengthening exercises and home program for eventual return to completion of simple meal prepping with increased independence-- MET  Pt will demo with improved bilateral coordination for completion of simple meal prepping activities for an increased PSFS level to 10/10-- PARTIALLY MET      Subjective Evaluation    Quality of life: good          SUBJECTIVE: \"I think I am doing good with the help of the therapy.  I am noticing it takes a couple tries to get deodrant on when I am using my L arm.  It doesn't go right to my arm pit.\"    PATIENT GOAL: \"I want to be able to be more active again.  Driving and all.\"    Patient-Specific Functional Scale   Task is scored 0 (unable to perform activity) to 10 (ability to perform activity independently)    Activity Date: 05/30/24 Date: 06/27/24   Improved abilities making a sandwich 8/10 9/10   2.   Improved coordination of LUE 5/10 7-8/10       HISTORY OF PRESENT ILLNESS:     Pt is a 68 y.o. female who was referred to Occupational Therapy s/p  Cerebrovascular accident (CVA), unspecified mechanism (HCC) [I63.9]. As per hospital reports, Pt presented to the emergency room on 05/04/2024 with a chief complaint of gait instability and discoordination for 24 hours.  Pt had initially had seen her dentist due to right lower jaw pain and then trialed NSAIDs.  Pt reported later in the day that she was having difficulty with walking in her living room.  Pt fell without loss of consciousness or head strike.  Pt was able to ambulate, but felt like she was \"intoxicated\".  Her symptoms improved however she was having difficulty with operating her cellular device.  On arrival to the emergency room there was concern for possible left facial droop she was out of the window for TNK and loaded with Plavix and aspirin.   Urgent neurologic consultation was obtained and a head CT showed an age indeterminant right thalamic infarct as well as a 2.5 cm extra-axial densely calcified " mass with associated hyperostosis lateral to the left frontal lobe consistent with previously known large meningioma.   CTA showed no large vessel occlusion although she did have evidence of left ICA flow-limiting critical stenosis.   Pt was evaluated in consultation by the neurology service and underwent MRI which confirmed right thalamic stroke.  Given critical left ICA stenosis he was evaluated by vascular surgery with plans for outpatient carotid endarterectomy.  In terms of her meningioma, the case was discussed with neurosurgery who agreed to see the Pt as an outpatient and felt there was no urgent neurosurgical needs.   Pt was discharged to home environment with plans to remain on dual antiplatelet therapy for 21 days followed by monotherapy with aspirin.  Pt was evaluated by physical therapy and Occupational Therapy and did not require inpatient rehabilitation.  Pt was recommended for further OP OT/PT services.  Pt had a f/u appt with neurosurgery 2* large meningioma with recommendations to return in one year for MRI.      Pt with self-report of continuing to notice lack of gross motor coordination with LUE affecting bilateral coordination, functional use of LUE, and hand to target precision.  Pt with self-report of noticing occasionally knocking over items.    Pt lives in a Senior Appt independently with frequent visits from Dtr in Law.  Pt does have elevator access.  Pt currently performing all ADLs at Mod I status and occasional Supervision for bathing routines.  Pt currently requires assistance for IADL completion at this time 2* safety reasons.  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  Pt loss of  role since CVA-- Pt's Dtr in Law is main transportation at this time.         PMH:   Past Medical History:   Diagnosis Date    History of ovarian cyst     Melanoma of shoulder, right (HCC)          Pain Levels:     Restin    With Activity:  6-7/10     Pt with occasional pain in L shoulder-- Pt with  self-report of falling on to L shoulder when experiencing CVA.  X-ray identified possible L acromioclavicular joint separation.    Objective     Functional Assessment        Comments  See impairment section for further details.       Impairment Observations:        IE RUE IE LUE  Comments                 UPPER EXTREMITY FXN Intact Impaired  Pt is R hand dominant                            /Pinch Strength           Dynamometer       - Gross Grasp 55 lbs 50 lbs  R: maintained  L: increased x 7 lbs.    Pinch Meter        - PINCER 13.5 lbs 10 lbs  R: increased x 6 lbs.  L: increased x 2 lbs    - TRIPOD 15 lbs 13.5 lbs  R: maintained  L: Increased x 1.5 lbs    - LATERAL 15 lbs  16 lbs  R: maintained  L: Increased x 1 lb               AROM (seated)        Elevation WFL WFL     Shoulder FF WFL  WFL      Shoulder Ext WFL  WFL      Shoulder Abd WFL  WFL      Shoulder Add WFL  WFL      Horizontal Abd WFL  WFL      Horizontal Add WFL  WFL      Elbow Flex WFL  WFL      Elbow Ext WFL  WFL      Pronation WFL  WFL      Supination WFL  WFL      Wrist Flex WFL  WFL      Wrist Ext WFL  WFL      Digit Flex WFL  WFL      Digit Ex WFL  WFL      Composite Grasp WFL  WFL      Hook Grasp WFL  WFL      Opposition Intact  Resolved; significant improved speed and accuracy evident     Dysdiadochokinesia Impaired bilateral coordination 2* delayed motor lag of LUE Improvement though continues to present with impaired bilateral coordination 2* delayed motor lag of LUE                          MMT           Shoulder FF 5/5 5/5     Shoulder Ext 5/5 5/5     Shoulder Abd 5/5 5/5     Shoulder ADd 5/5 5/5     Elbow Flex 5/5 5/5     Elbow Ext 5/5 5/5     Wrist Flex 5/5 5/5     Wrist Ext 5/5 5/5     SENSATION       Myofilaments (2.83 WNL) 2.83 2.83     Sharp Dull  Intact Intact     Proprioception Intact Impaired  Remains   Hot/Cold Temp Intact Intact            COORDINATION       9 Hole Peg Test 23.14 seconds 55 seconds with x 2 droppage  L: able to  complete test x 25 seconds faster   Fxnl Dexterity Test 40.09 seconds 1 minute and 14 seconds x 2 droppage  L: able to complete test x 8 seconds faster   MODIFIED MELISSA SCALE (TONE)       No increase in muscle tone (0) 0 0     Slight Increase in muscle tone with catch and release or min resist at end range (1)       Slight Increase in muscle tone with catch and release, followed by min resistance through remainder of range (1+)       Increased muscle tone through full range, able to be moved easily (2)       Considerable increase in tone, difficult to move (3)       Rigid in Flexion/Extension (4)                                     Upper Extremity Function (Fine motor, ADL):    Are you able to turn a key in a lock? Without any difficulty  Are you able to brush your teeth? Without any difficulty  Are you able to make a phone call using a touch tone or key-pad? Without any difficulty  Are you able to  coins from a table top? Without any difficulty  Are you are able to write with a pen or pencil? Without any difficulty  Are you able to open and close a zipper? Without any difficulty  Are you able to wash and dry your body? Without any difficulty  Are you able to shampoo your hair? Without any difficulty  Are you able to open previously opened jars? Without any difficulty  Are you able to hold a plate full of food? Without any difficulty  Are you able to pull on trousers? Without any difficulty  Are you able to button your shirt? Without any difficulty  Are you able to trim your fingernails? Without any difficulty  Are you able to cut your toe nails? Get them done by salon  Are you able to bend down and  clothing from the floor? Without any difficulty  How much DIFFICULTY do you currently have using a spoon to eat a meal? No difficulty  How much DIFFICULTY do you currently have putting on a pullover shirt? No difficulty  How much DIFFICULTY do you currently have taking off a pullover shirt? No  difficulty  How much DIFFICULTY do you currently have removing wrappings from small objects? No difficulty  How much DIFFUCULTY do you currently have opening medications or vitamin containers (e.g. childproof containers, small bottles)? No difficulty      OTHER PLANNED THERAPY INTERVENTIONS:   Supine, seated, and in stance neuro re-ed  FMC/prehension/in-hand manipulation  Bilateral integrative task  Timed Trials  Proprioceptive training of LUE  Hand to target  Seated functional reach: crossing midline  WBearing strategies   Closed chain activities    INTERVENTION COMMENTS:  Diagnosis: Cerebrovascular accident (CVA), unspecified mechanism (HCC) [I63.9]  Precautions: fall risk  Insurance: Payor: [employer]  REP / Plan: [employer] MEDICARE ADVANTRA / Product Type: Medicare HMO /   7 of 10 visits, PN due 7/27/2024

## 2024-06-27 NOTE — PROGRESS NOTES
"PHYSICAL THERAPY TREATMENT / UPDATE    Date: 24  Name: Sanam Castro  : 1955  Referring Provider: Dia Redman MD  AUTHORIZATION:   Insurance: Payor: ORLIN  REP / Plan: [employer] MEDICARE ADVANTRA / Product Type: Medicare HMO /     SUBJECTIVE:  HPI: Sanam Castro is a 68 y.o. female referred to outpatient physical therapy for the following diagnosis   Encounter Diagnosis   Name Primary?    Cerebrovascular accident (CVA), unspecified mechanism (HCC) Yes           Reports feeling about the same after last visit including continued difficulty going to sleep since stroke. Reports going to more doctors appointments since last visit and needing new glasses for both reading and distance. Reports ongoing L shoulder pain post-CVA especially with overhead movements. Upcoming MRI for L shoulder ordered by PCP on  and  follow up visit with ortho. Follow up visit with vascular surgeon scheduled for Aug 22.  Doing better with mobility, reports has more energy so is organizing things at home.  Will go to [place] in August.      Patient-Specific Functional Scale   Task is scored 0 (unable to perform activity) to 10 (ability to perform activity independently)  Activity     Get back to being active.     2.   Not being reliant on the walker.     3.   Return to driving.         OBJECTIVE:    Precautions - fall risk    Patient reports pain and difficulty with L shoulder overhead movements including end range active flexion and abduction. Reports similar pain with reaching and grabbing objects overhead in cabinets at home. Does not demonstrate painful arc until closer to end range. Reports pain with active shoulder elevation, with the most pain at end range. Demonstrated b/l shoulder abduction weakness 4/5 MMT.  Some tenderness to palpation around AC joint area. Reports pain and feeling \"separation\" at joint during L shoulder resisted ER. Difficulty with maintaining L arm at side during " "scapular retraction due to pain.     Specialty Treatment Diary  6/27/24 6/25/24 6/20/24 6/18/24 6/12/24   Home exercise program        (Ther-ex, neuromuscular re-ed)Walking/endurance        (Neuromuscular re-ed) Static and dynamic balance/anticipatory / reactive Resisted walking 10 lbs.  100' lap  27.3 sec.  24 sec.  21.96      Blaze pods 6 targets in SoloStep with foam ramp and foam pad  1 min x 3 sets       Blaze pods 6 targets in SoloStep with foam ramp and foam mat with foam pads randomly underneath  1 min x 3 sets  9 targets  9 targets    Banded Shoulder ER \"no moneys\" with yellow theraband, about 10 reps., limited due to L SH pain    Scapular rows with red theraband   about 12 reps., limited due to L SH pain  /82    Testing above/below    Resisted walking pulling 10 lbs 150 feet x 3 set  Fast walking 150 feet x 4 sets  Best 34.1 sec (down and back)    Blaze pods 6 targets  In solostep  Foam incline  12\" obstacle  4\" step   1 min x 3 sets    Blaze pods 6 targets  In solostep  2\" foam mat with 2 x 8\" foam underneath randomly  Foam incline  1 min x 3 sets    Batting 8\" ball with tennis racquet, multidirection stepping  4 min    Passing soccer ball back and forth, multidirection stepping about 2.5 min /84  67 bpm 99% SpO2    Testing above/below    step up foam incline  Tap blaze pods  6 targets  60 sec x 3 sets    Blaze pods   8\" step   Foam incline  On and over airex foam  1 min x 3 sets    Blaze pods   6 targets   Lateral steps over 6-12\" objects  1 min x 3 sets     FGA  20/30    Fast walking 75 feet down and back, timed  36 sec  31 sec  29.4 sec  28.6 sec    Walking collecting cones from behind, requiring turning head and looking back while walking  About 150 feet x 3  Walking and catching bounced ball, 150 feet x 3    Step up foam incline  Tap blaze pods  6 targets  45 sec x 3 sets  1 set carry 9 lb water weight    Passing soccer ball walking back and forth 3 min    Blaze pods in 6-12\" obstacle " "course  1 min x 4 sets       Outcomes below     Blaze pod reactive balance  6 targets  In solostep  12' radius on floor  1 min x 2  Then + carry 9 lb water weight  1 min x 3    Then similar foam incline  1 min x 3 sets    Fast walking 75 feet down and back, timed  42 sec  39 sec  37 sec  40 sec   43 sec    Walk and catch ball all planes, Then similar with change of direction forwards to back, and turning 180 degrees with ball catch 2.5 min     (Ther-ex) Strengthening        (There-ex) Stretching                       Mobility Measures 6/25/24 6/20/24 6/12/24 5/22/24   5 Time Sit to Stand  (17\" chair, arms across chest) 16.3 seconds 20.1 seconds 18.2 seconds 14.7 seconds   3 Meter Timed  Up & Go   7.4 seconds 8.9 seconds   Walking speed (self-selected)       Functional Gait Assessment (see below) 22/30 18/30 19/30 12/30   6 Minute Walk Test (100 foot circular course) Completed without assistive device with close supervision only  1160 feet no falls no foot scuffing Completed without assistive device in [facility]    1116 feet no falls or loss of balance, mild foot scuffing 5 minutes into walking  87 bpm 97% SpO2      1300 feet with rolling walker    Ending heart rate 90s bpm   Patient-Reported Outcome Measure: Activities-Specific Balance Confidence Scale (ABC Scale)  89%  49%     Functional Gait Assessment  3/3 Gait level surface  3/3 Change in gait speed  2/3 Gait with horizontal head turns  1/3 Gait with vertical head turns  3/3 Gait and pivot turn  3/3 Step over obstacle  2/3 Gait with narrow base of support  3/3 Gait with eyes closed  1/3 Ambulating backwards  1/3 Steps  22/30 Total score (less than 22/30 indicates increased risk of fall)..    ASSESSMENT:  Jaimie is a 68 year old female with mobility limitations following CVA 5/04/24.      Jaimie shows improvement in reactive balance and gait. Able to complete activities with uneven surfaces with less supervision. Assessed L shoulder due to upcoming MRI. Reports L " "shoulder pain post-CVA with UE movement including overhead movements, full range flexion, full range ABD and ther ex including banded Shoulder ER \"no moneys\" with yellow theraband and scapular rows with red theraband. Demonstrated difficulty with keeping L elbow at side due to L shoulder pain during rows and difficulty ER L shoulder with banded exercise. 4/5 L SH ABD strength. Will continue to evaluate next visit. Continues to require physical therapy to return to previous level of function.      SHORT-TERM GOALS: by 6/05/24, continued to 7/08/24  1. Patient scores at least 16/30 on FGA.  MET.  2. Patient walks at least 1500 feet during 6 minute walk test. NOT MET.  3. Patient safely walks within the home with a cane.  MET.    LONG-TERM GOALS: by 7/22/24   1. Patient returns to prior level of community walking.  2. Patient scores at least 23/30 on FGA for safe mobility without assistive device.    PLAN OF CARE:  Patient will benefit from physical therapy 2 times per week for 1 month  Neuromuscular re-education, therapeutic exercises, and therapeutic activities as outlined in grids.    Gerald Daniels, PT  6/27/2024  "

## 2024-07-02 ENCOUNTER — OFFICE VISIT (OUTPATIENT)
Dept: OCCUPATIONAL THERAPY | Facility: REHABILITATION | Age: 69
End: 2024-07-02
Payer: COMMERCIAL

## 2024-07-02 ENCOUNTER — OFFICE VISIT (OUTPATIENT)
Facility: REHABILITATION | Age: 69
End: 2024-07-02
Payer: COMMERCIAL

## 2024-07-02 DIAGNOSIS — I63.9 CEREBROVASCULAR ACCIDENT (CVA), UNSPECIFIED MECHANISM (HCC): Primary | ICD-10-CM

## 2024-07-02 PROCEDURE — 97530 THERAPEUTIC ACTIVITIES: CPT | Performed by: PHYSICAL THERAPIST

## 2024-07-02 PROCEDURE — 97112 NEUROMUSCULAR REEDUCATION: CPT | Performed by: PHYSICAL THERAPIST

## 2024-07-02 PROCEDURE — 97530 THERAPEUTIC ACTIVITIES: CPT | Performed by: OCCUPATIONAL THERAPIST

## 2024-07-02 NOTE — PROGRESS NOTES
PHYSICAL THERAPY TREATMENT     Date: 24  Name: Sanam Castro  : 1955  Referring Provider: Dia Redman MD  AUTHORIZATION:   Insurance: Payor: ORLIN  REP / Plan: [employer] ADVANTRA / Product Type: Medicare HMO /     SUBJECTIVE:  HPI: Sanam Castro is a 68 y.o. female referred to outpatient physical therapy for the following diagnosis   Encounter Diagnosis   Name Primary?    Cerebrovascular accident (CVA), unspecified mechanism (HCC) Yes     24  Reports continuing to work on walking outside of physical therapy. Got MRI for L shoulder this week and returns to ortho next week.        Reports feeling about the same after last visit including continued difficulty going to sleep since stroke. Reports going to more doctors appointments since last visit and needing new glasses for both reading and distance. Reports ongoing L shoulder pain post-CVA especially with overhead movements. Upcoming MRI for L shoulder ordered by PCP on  and  follow up visit with ortho. Follow up visit with vascular surgeon scheduled for Aug 22.  Doing better with mobility, reports has more energy so is organizing things at home. Will go to [place] in August.      Patient-Specific Functional Scale   Task is scored 0 (unable to perform activity) to 10 (ability to perform activity independently)  Activity     Get back to being active.     2.   Not being reliant on the walker.     3.   Return to driving.         OBJECTIVE:    Precautions - fall risk    24  Continue to challenge balance and work on gait speed. Therapeutic activities with unevensurfaces to mimic sand at beach.    24  Patient reports pain and difficulty with L shoulder overhead movements including end range active flexion and abduction. Reports similar pain with reaching and grabbing objects overhead in cabinets at home. Does not demonstrate painful arc until closer to end range. Reports pain with active shoulder  "elevation, with the most pain at end range. Demonstrated b/l shoulder abduction weakness 4/5 MMT.  Some tenderness to palpation around AC joint area. Reports pain and feeling \"separation\" at joint during L shoulder resisted ER. Difficulty with maintaining L arm at side during scapular retraction due to pain.     Specialty Treatment Diary  7/02/24 6/27/24 6/25/24 6/20/24 6/18/24 6/12/24   Home exercise program         (Ther-ex, neuromuscular re-ed)Walking/endurance         (Neuromuscular re-ed) Static and dynamic balance/anticipatory / reactive Fast walking, half laps 50 feet  X 4    Resisted walking 10 lbs., half 50 feetlaps  X4    Blaze pods 6 targets in SoloStep with foam ramp and foam mat with foam pads randomly underneath  1 min x 3 sets    Batting 8\" ball with tennis racquet, multidirection stepping  5 min.    Passing soccer ball back and forth, multidirection stepping about 5 min. Resisted walking 10 lbs.  100' lap  27.3 sec.  24 sec.  21.96    Blaze pods 6 targets in SoloStep with foam ramp and foam pad  1 min x 3 sets     Blaze pods 6 targets in SoloStep with foam ramp and foam mat with foam pads randomly underneath  1 min x 3 sets  9 targets  9 targets    Banded Shoulder ER \"no moneys\" with yellow theraband, about 10 reps., limited due to L SH pain    Scapular rows with red theraband   about 12 reps., limited due to L SH pain  /82    Testing above/below    Resisted walking pulling 10 lbs 150 feet x 3 set  Fast walking 150 feet x 4 sets  Best 34.1 sec (down and back)    Blaze pods 6 targets  In solostep  Foam incline  12\" obstacle  4\" step   1 min x 3 sets    Blaze pods 6 targets  In solostep  2\" foam mat with 2 x 8\" foam underneath randomly  Foam incline  1 min x 3 sets    Batting 8\" ball with tennis racquet, multidirection stepping  4 min    Passing soccer ball back and forth, multidirection stepping about 2.5 min /84  67 bpm 99% SpO2    Testing above/below    step up foam incline  Tap blaze " "pods  6 targets  60 sec x 3 sets    Blaze pods   8\" step   Foam incline  On and over airex foam  1 min x 3 sets    Blaze pods   6 targets   Lateral steps over 6-12\" objects  1 min x 3 sets     FGA  20/30    Fast walking 75 feet down and back, timed  36 sec  31 sec  29.4 sec  28.6 sec    Walking collecting cones from behind, requiring turning head and looking back while walking  About 150 feet x 3  Walking and catching bounced ball, 150 feet x 3    Step up foam incline  Tap blaze pods  6 targets  45 sec x 3 sets  1 set carry 9 lb water weight    Passing soccer ball walking back and forth 3 min    Blaze pods in 6-12\" obstacle course  1 min x 4 sets       Outcomes below     Blaze pod reactive balance  6 targets  In [facility]  12' radius on floor  1 min x 2  Then + carry 9 lb water weight  1 min x 3    Then similar foam incline  1 min x 3 sets    Fast walking 75 feet down and back, timed  42 sec  39 sec  37 sec  40 sec   43 sec    Walk and catch ball all planes, Then similar with change of direction forwards to back, and turning 180 degrees with ball catch 2.5 min     (Ther-ex) Strengthening         (There-ex) Stretching                         Mobility Measures 6/25/24 6/20/24 6/12/24 5/22/24   5 Time Sit to Stand  (17\" chair, arms across chest) 16.3 seconds 20.1 seconds 18.2 seconds 14.7 seconds   3 Meter Timed  Up & Go   7.4 seconds 8.9 seconds   Walking speed (self-selected)       Functional Gait Assessment (see below) 22/30 18/30 19/30 12/30   6 Minute Walk Test (100 foot circular course) Completed without assistive device with close supervision only  1160 feet no falls no foot scuffing Completed without assistive device in [facility]    1116 feet no falls or loss of balance, mild foot scuffing 5 minutes into walking  87 bpm 97% SpO2      1300 feet with rolling walker    Ending heart rate 90s bpm   Patient-Reported Outcome Measure: Activities-Specific Balance Confidence Scale (ABC Scale)  89%  49%     Functional Gait " "Assessment  3/3 Gait level surface  3/3 Change in gait speed  2/3 Gait with horizontal head turns  1/3 Gait with vertical head turns  3/3 Gait and pivot turn  3/3 Step over obstacle  2/3 Gait with narrow base of support  3/3 Gait with eyes closed  1/3 Ambulating backwards  1/3 Steps  22/30 Total score (less than 22/30 indicates increased risk of fall)..    ASSESSMENT:    7/02/24  Jaimie continues to progress with reactive balance and exercise tolerance. Tolerated longer durations of tennis and soccer today. Kicked soccer ball with L foot with more accuracy more consistently today compared to previous. Will continue challenging balance and working on uneven surfaces with directional changes to work towards goals of being able to walk on the beach with family in August.      6/27/24  Jaimie is a 68 year old female with mobility limitations following CVA 5/04/24.      Jaimie shows improvement in reactive balance and gait. Able to complete activities with uneven surfaces with less supervision. Assessed L shoulder due to upcoming MRI. Reports L shoulder pain post-CVA with UE movement including overhead movements, full range flexion, full range ABD and ther ex including banded Shoulder ER \"no moneys\" with yellow theraband and scapular rows with red theraband. Demonstrated difficulty with keeping L elbow at side due to L shoulder pain during rows and difficulty ER L shoulder with banded exercise. 4/5 L SH ABD strength. Will continue to evaluate next visit. Continues to require physical therapy to return to previous level of function.      SHORT-TERM GOALS: by 6/05/24, continued to 7/08/24  1. Patient scores at least 16/30 on FGA.  MET.  2. Patient walks at least 1500 feet during 6 minute walk test. NOT MET.  3. Patient safely walks within the home with a cane.  MET.    LONG-TERM GOALS: by 7/22/24   1. Patient returns to prior level of community walking.  2. Patient scores at least 23/30 on FGA for safe mobility without assistive " device.    PLAN OF CARE:  Patient will benefit from physical therapy 2 times per week for 1 month  Neuromuscular re-education, therapeutic exercises, and therapeutic activities as outlined in grids.    Gerald Daniels, PT  7/02/2024

## 2024-07-02 NOTE — PROGRESS NOTES
"Daily Note     Today's date: 2024  Patient name: Sanam Castro  : 1955  MRN: [id number]  Referring provider: Dia Redman MD  Dx:   Encounter Diagnosis     ICD-10-CM    1. Cerebrovascular accident (CVA), unspecified mechanism (HCC)  I63.9                      Subjective: \"I went for an MRI of my shoulder yesterday.  I am waiting on the results.\"      Objective: See treatment description below    Thera Act: Pt began tx session with target taps (circles) on BITS screen placed in all planes with added central fixation distraction with LUE and added #1 wrist weight donned focusing on improved LUE gross motor control, improved hand to target precision, improved LUE kinesthetic awareness, and improved reaction time of LUE.  Pt performed x 5 trials.    Trial #1: 76.92% accuracy; 2:00 time to complete; 2.97 second reaction time; 40 hits  Trial #2: 86% accuracy; 2:00 time to complete; 2.77 second reaction time; 43 hits  Trial #3: 74.63% accuracy; 2:00 time to complete; 2.39 second reaction time; 50 hits  Trial #4: 70.31% accuracy; 2:00 time to complete; 2.60 second reaction time; 45 hits  Trial #5: 77.78% accuracy; 2:00 time to complete; 2.42 second reaction time; 49 hits    Pt transitioned to performing card retrieval for placement on matching card on board with added in-hand manipulation demands of rotating card x1 full rotation unilaterally in L hand only focusing on improved speed and accuracy of in-hand manipulation and hand to target precision.     Assessment: Tolerated treatment well. Patient would benefit from continued OT    Pt demo with ongoing mild ataxia resulting in decreased gross motor coordination, dysmetria with hand to target demands, and slight impaired proprioception and kinesthetic awareness.  Pt did presented with abilities to improve reaction time each trial with reaction time beginning at 2.97 seconds and concluding with reaction time at 2.42 seconds.  Pt did fluctuate with accuracy " 2* increased ataxia noted on some trials.  Pt demo with significant improve speed and accuracy of in-hand manipulation demands of rotating card unilaterally in L hand prior to placement on board without distal motor delays and without droppage evident throughout.     Plan: Continue per plan of care.      INTERVENTION COMMENTS:  Diagnosis: Cerebrovascular accident (CVA), unspecified mechanism (HCC) [I63.9]  Precautions: fall risk  Insurance: Payor: ORLIN  REP / Plan: [employer] MEDICARE ADVANTRA / Product Type: Medicare HMO /   1 of 10 visits, PN due 7/27/2024

## 2024-07-03 ENCOUNTER — OFFICE VISIT (OUTPATIENT)
Facility: REHABILITATION | Age: 69
End: 2024-07-03
Payer: COMMERCIAL

## 2024-07-03 ENCOUNTER — OFFICE VISIT (OUTPATIENT)
Dept: OCCUPATIONAL THERAPY | Facility: REHABILITATION | Age: 69
End: 2024-07-03
Payer: COMMERCIAL

## 2024-07-03 DIAGNOSIS — I63.9 CEREBROVASCULAR ACCIDENT (CVA), UNSPECIFIED MECHANISM (HCC): Primary | ICD-10-CM

## 2024-07-03 PROCEDURE — 97112 NEUROMUSCULAR REEDUCATION: CPT | Performed by: PHYSICAL THERAPIST

## 2024-07-03 PROCEDURE — 97530 THERAPEUTIC ACTIVITIES: CPT | Performed by: PHYSICAL THERAPIST

## 2024-07-03 PROCEDURE — 97530 THERAPEUTIC ACTIVITIES: CPT | Performed by: OCCUPATIONAL THERAPIST

## 2024-07-03 NOTE — PROGRESS NOTES
"Daily Note     Today's date: 7/3/2024  Patient name: Sanam Castro  : 1955  MRN: [id number]  Referring provider: Dia Redman MD  Dx:   Encounter Diagnosis     ICD-10-CM    1. Cerebrovascular accident (CVA), unspecified mechanism (HCC)  I63.9                      Subjective: \"I am a little tired today.  I am a little overwhelmed with two days in a row.\"      Objective: See treatment description below    Thera Act: Pt began tx session with in stance functional reach with midline crossing for dowel retrieval and placement in dowel board utilizing mirror as visual feedback and with use of composite grasp to calibrated hand gripper at level 2 resistance to facilitate improved LUE proprioception/kinesthetic awareness, improved gross motor coordination, improved L distal strength, improved LUE muscle endurance, and improved overall hand to target precision.  Pt advanced to removing each dowel for placement in large bucket with use of mirror for visual feedback to further challenge hand to target and LUE proprioception/kinesthetic awareness.    Pt concluded tx session with in-hand manipulation task of rotating/manipulating tennis ball to locate each letter on tennis ball in alphabetical order unilaterally with L hand only to improve speed and accuracy if in-hand manipulation.  Pt completed x 2 trials.     Trial #1: 6 minutes and 23 seconds  Trial #2:5 minutes and 41 seconds    Assessment: Tolerated treatment well. Patient would benefit from continued OT    Pt presents Max difficulties utilizing mirror as visual feedback to place dowels in dowel board with large dysmetria evident throughout 2* impaired LUE proprioception/kinesthetic awareness-- Pt presented with significant droppage of dowels throughout. Pt did present with less ataxia throughout improving overall gross motor coordination.  Pt with self-report of Max fatigue with sustaining grasp to calibrated hand gripper with distal weakness/fatigue.  Pt " continues to present with bradykinetic paced in-hand manipulation with increased time required to locate letters placed on tennis ball unilaterally.  Pt was able to complete task faster on second trial, though presented with two occasions of droppage throughout.    Plan: Continue per plan of care.      INTERVENTION COMMENTS:  Diagnosis: Cerebrovascular accident (CVA), unspecified mechanism (HCC) [I63.9]  Precautions: fall risk  Insurance: Payor: ORLIN  REP / Plan: [employer] MEDICARE ADVANTRA / Product Type: Medicare HMO /   2 of 10 visits, PN due 7/27/2024

## 2024-07-03 NOTE — PROGRESS NOTES
PHYSICAL THERAPY TREATMENT     Date: 24  Name: Sanam Castro  : 1955  Referring Provider: Dia Redman MD  AUTHORIZATION:   Insurance: Payor: ORLIN  REP / Plan: [employer] MEDICARE ADVANTRA / Product Type: Medicare HMO /     SUBJECTIVE:  HPI: Sanam Castro is a 68 y.o. female referred to outpatient physical therapy for the following diagnosis   Encounter Diagnosis   Name Primary?    Cerebrovascular accident (CVA), unspecified mechanism (HCC) Yes     24  Reports fatigue after last session. No update on L shoulder MRI yet.    24  Reports continuing to work on walking outside of physical therapy. Got MRI for L shoulder this week and returns to ortho next week.      Reports feeling about the same after last visit including continued difficulty going to sleep since stroke. Reports going to more doctors appointments since last visit and needing new glasses for both reading and distance. Reports ongoing L shoulder pain post-CVA especially with overhead movements. Upcoming MRI for L shoulder ordered by PCP on  and  follow up visit with ortho. Follow up visit with vascular surgeon scheduled for Aug 22.  Doing better with mobility, reports has more energy so is organizing things at home. Will go to [place] in August.      Patient-Specific Functional Scale   Task is scored 0 (unable to perform activity) to 10 (ability to perform activity independently)  Activity     Get back to being active.     2.   Not being reliant on the walker.     3.   Return to driving.         OBJECTIVE:    Precautions - fall risk    24  Continue to work on increasing gait speed, dynamic and reactive balance, and walking on uneven surfaces to meet patient goals of going to the beach. Held exercises involving L shoulder today.    24  Continue to challenge balance and work on gait speed. Therapeutic activities with uneven surfaces to mimic sand at beach.    24  Patient reports  "pain and difficulty with L shoulder overhead movements including end range active flexion and abduction. Reports similar pain with reaching and grabbing objects overhead in cabinets at home. Does not demonstrate painful arc until closer to end range. Reports pain with active shoulder elevation, with the most pain at end range. Demonstrated b/l shoulder abduction weakness 4/5 MMT.  Some tenderness to palpation around AC joint area. Reports pain and feeling \"separation\" at joint during L shoulder resisted ER. Difficulty with maintaining L arm at side during scapular retraction due to pain.     Specialty Treatment Diary  7/03/24 7/02/24 6/27/24 6/25/24 6/20/24 6/18/24 6/12/24   Home exercise program          (Ther-ex, neuromuscular re-ed)Walking/endurance          (Neuromuscular re-ed) Static and dynamic balance/anticipatory / reactive Fast walking, 75' laps  x3    Resisted walking #15 lbs, 100' lap  x2  24.8 sec.  24.0 sec.     Blaze pods 6 targets in SoloStep with foam ramp and foam mat with foam pads randomly underneath  1 min x 2 sets    Batting 8\" ball with tennis racquet, multidirection stepping  5 min.    Passing soccer ball back and forth, multidirection stepping about 5 min. Fast walking, half laps 50 feet  X 4    Resisted walking 10 lbs., half 50 feetlaps  X4    Blaze pods 6 targets in SoloStep with foam ramp and foam mat with foam pads randomly underneath  1 min x 3 sets    Batting 8\" ball with tennis racquet, multidirection stepping  5 min.    Passing soccer ball back and forth, multidirection stepping about 5 min. Resisted walking 10 lbs.  100' lap  27.3 sec.  24 sec.  21.96    Blaze pods 6 targets in SoloStep with foam ramp and foam pad  1 min x 3 sets     Blaze pods 6 targets in SoloStep with foam ramp and foam mat with foam pads randomly underneath  1 min x 3 sets  9 targets  9 targets    Banded Shoulder ER \"no moneys\" with yellow theraband, about 10 reps., limited due to L SH pain    Scapular rows with " "red theraband   about 12 reps., limited due to L SH pain  /82    Testing above/below    Resisted walking pulling 10 lbs 150 feet x 3 set  Fast walking 150 feet x 4 sets  Best 34.1 sec (down and back)    Blaze pods 6 targets  In solostep  Foam incline  12\" obstacle  4\" step   1 min x 3 sets    [facility] 6 [facility]  [facility]  [facility]\" foam mat with 2 x 8\" foam underneath randomly  Foam incline  1 min x 3 sets    Batting 8\" ball with tennis racquet, multidirection stepping  4 min    Passing soccer ball back and forth, multidirection stepping about 2.5 min /84  67 bpm 99% SpO2    Testing above/below    step up foam incline  Tap blaze pods  6 targets  60 sec x 3 sets    Blaze pods   8\" step   Foam incline  On and over airex foam  1 min x 3 sets    Blaze pods   6 targets   Lateral steps over 6-12\" objects  1 min x 3 sets     FGA  20/30    Fast walking 75 feet down and back, timed  36 sec  31 sec  29.4 sec  28.6 sec    Walking collecting cones from behind, requiring turning head and looking back while walking  About 150 feet x 3  Walking and catching bounced ball, 150 feet x 3    Step up foam incline  Tap blaze pods  6 targets  45 sec x 3 sets  1 set carry 9 lb water weight    Passing soccer ball walking back and forth 3 min    Blaze pods in 6-12\" obstacle course  1 min x 4 sets       Outcomes below     Blaze pod reactive balance  6 targets  In soloste  12' radius on floor  1 min x 2  Then + carry 9 lb water weight  1 min x 3    Then similar foam incline  1 min x 3 sets    Fast walking 75 feet down and back, timed  42 sec  39 sec  37 sec  40 sec   43 sec    Walk and catch ball all planes, Then similar with change of direction forwards to back, and turning 180 degrees with ball catch 2.5 min     (Ther-ex) Strengthening          (There-ex) Stretching                           Mobility Measures 6/25/24 6/20/24 6/12/24 5/22/24   5 Time Sit to Stand  (17\" chair, arms across chest) 16.3 seconds 20.1 seconds 18.2 " seconds 14.7 seconds   3 Meter Timed  Up & Go   7.4 seconds 8.9 seconds   Walking speed (self-selected)       Functional Gait Assessment (see below) 22/30 18/30 19/30 12/30   6 Minute Walk Test (100 foot circular course) Completed without assistive device with close supervision only  1160 feet no falls no foot scuffing Completed without assistive device in [facility]    1116 feet no falls or loss of balance, mild foot scuffing 5 minutes into walking  87 bpm 97% SpO2      1300 feet with rolling walker    Ending heart rate 90s bpm   Patient-Reported Outcome Measure: Activities-Specific Balance Confidence Scale (ABC Scale)  89%  49%     Functional Gait Assessment  3/3 Gait level surface  3/3 Change in gait speed  2/3 Gait with horizontal head turns  1/3 Gait with vertical head turns  3/3 Gait and pivot turn  3/3 Step over obstacle  2/3 Gait with narrow base of support  3/3 Gait with eyes closed  1/3 Ambulating backwards  1/3 Steps  22/30 Total score (less than 22/30 indicates increased risk of fall)..    ASSESSMENT:  Jaimie is a 68 year old female with mobility limitations following CVA 5/04/24.      7/03/24  Similar to yesterday. Continues to progress with reactive balance and cardiovascular endurance. Worked on cardiovascular endurance activities for longer periods of time compared to previous sessions including tennis and soccer. Demonstrating improvement in L sided functional mobility during activities such as hitting backhands (one-handed, using R hand) in tennis and kicking a soccer ball with L foot but needs continued improvement with coordinating movements. Continue to challenge reactive and dynamic balance, cardiovascular endurance, and gait speed for safe ambulation. Will continue to encourage use of L sided functional activities to return to previous level of function including playing tennis and pickleball.       7/02/24  Jaimie continues to progress with reactive balance and exercise tolerance. Tolerated longer  "durations of tennis and soccer today. Kicked soccer ball with L foot with more accuracy more consistently today compared to previous. Will continue challenging balance and working on uneven surfaces with directional changes to work towards goals of being able to walk on the beach with family in August.      6/27/24  Jaimie is a 68 year old female with mobility limitations following CVA 5/04/24.      Jaimie shows improvement in reactive balance and gait. Able to complete activities with uneven surfaces with less supervision. Assessed L shoulder due to upcoming MRI. Reports L shoulder pain post-CVA with UE movement including overhead movements, full range flexion, full range ABD and ther ex including banded Shoulder ER \"no moneys\" with yellow theraband and scapular rows with red theraband. Demonstrated difficulty with keeping L elbow at side due to L shoulder pain during rows and difficulty ER L shoulder with banded exercise. 4/5 L SH ABD strength. Will continue to evaluate next visit. Continues to require physical therapy to return to previous level of function.      SHORT-TERM GOALS: by 6/05/24, continued to 7/08/24  1. Patient scores at least 16/30 on FGA.  MET.  2. Patient walks at least 1500 feet during 6 minute walk test. NOT MET.  3. Patient safely walks within the home with a cane.  MET.    LONG-TERM GOALS: by 7/22/24   1. Patient returns to prior level of community walking.  2. Patient scores at least 23/30 on FGA for safe mobility without assistive device.  3. Patient able to safely participate in walking on the beach during her family vacation in early August.     PLAN OF CARE:  Patient will benefit from physical therapy 2 times per week for 1 month  Neuromuscular re-education, therapeutic exercises, and therapeutic activities as outlined in grids.      Treatment performed by FRANCESCO Franklin, under direct supervision of Gerald Galvez, PT.     Gerald Galvez, PT  7/03/2024  "

## 2024-07-08 ENCOUNTER — OFFICE VISIT (OUTPATIENT)
Facility: REHABILITATION | Age: 69
End: 2024-07-08
Payer: COMMERCIAL

## 2024-07-08 ENCOUNTER — OFFICE VISIT (OUTPATIENT)
Dept: OCCUPATIONAL THERAPY | Facility: REHABILITATION | Age: 69
End: 2024-07-08
Payer: COMMERCIAL

## 2024-07-08 DIAGNOSIS — I63.9 CEREBROVASCULAR ACCIDENT (CVA), UNSPECIFIED MECHANISM (HCC): Primary | ICD-10-CM

## 2024-07-08 PROCEDURE — 97530 THERAPEUTIC ACTIVITIES: CPT | Performed by: OCCUPATIONAL THERAPIST

## 2024-07-08 NOTE — PROGRESS NOTES
PHYSICAL THERAPY TREATMENT     Date: 24  Name: Sanam Castro  : 1955  Referring Provider: Dia Redman MD  AUTHORIZATION:   Insurance: Payor: ORLIN  REP / Plan: [employer] MEDICARE ADVANTRA / Product Type: Medicare HMO /     SUBJECTIVE:  HPI: Sanam Castro is a 68 y.o. female referred to outpatient physical therapy for the following diagnosis   Encounter Diagnosis   Name Primary?    Cerebrovascular accident (CVA), unspecified mechanism (HCC) Yes     24  Reports continuing to work on walking outside of PT. L shoulder continues to bother her. Appointment to orthopedic tomorrow to discuss L shoulder MRI.     24  Reports fatigue after last session. No update on L shoulder MRI yet.    24  Reports continuing to work on walking outside of physical therapy. Got MRI for L shoulder this week and returns to ortho next week.    24  Reports feeling about the same after last visit including continued difficulty going to sleep since stroke. Reports going to more doctors appointments since last visit and needing new glasses for both reading and distance. Reports ongoing L shoulder pain post-CVA especially with overhead movements. Upcoming MRI for L shoulder ordered by PCP on  and  follow up visit with ortho. Follow up visit with vascular surgeon scheduled for Aug 22.  Doing better with mobility, reports has more energy so is organizing things at home. Will go to [place] in August.      Patient-Specific Functional Scale   Task is scored 0 (unable to perform activity) to 10 (ability to perform activity independently)  Activity     Get back to being active.     2.   Not being reliant on the walker.     3.   Return to driving.         OBJECTIVE:    Precautions - fall risk    24  Continuing to work on increasing gait speed, improving dynamic and reactive balance, and walking on uneven surfaces with obstacles to be able to safely and confidently go to the beach with  "her family and reduce risk of falls. Held L shoulder exercises today.     7/03/24  Continue to work on increasing gait speed, dynamic and reactive balance, and walking on uneven surfaces to meet patient goals of going to the beach. Held exercises involving L shoulder today.    7/02/24  Continue to challenge balance and work on gait speed. Therapeutic activities with uneven surfaces to mimic sand at beach.    6/27/24  Patient reports pain and difficulty with L shoulder overhead movements including end range active flexion and abduction. Reports similar pain with reaching and grabbing objects overhead in cabinets at home. Does not demonstrate painful arc until closer to end range. Reports pain with active shoulder elevation, with the most pain at end range. Demonstrated b/l shoulder abduction weakness 4/5 MMT.  Some tenderness to palpation around AC joint area. Reports pain and feeling \"separation\" at joint during L shoulder resisted ER. Difficulty with maintaining L arm at side during scapular retraction due to pain.     Specialty Treatment Diary  7/08/24 7/03/24 7/02/24 6/27/24 6/25/24 6/20/24 6/18/24 6/12/24   Home exercise program           (Ther-ex, neuromuscular re-ed)Walking/endurance           (Neuromuscular re-ed) Static and dynamic balance/anticipatory / reactive Overground walking, 100' laps  x3  21.19 sec.  23.37 sec.  23.15 sec.    Overground walking, 50' laps   x2  11.12 sec.  9.34 sec.    Resisted walking, 50' laps with #15  x3  27 sec.  24.6 sec.  22.8 sec.     Blaze pods 6 targets in SoloStep with foam ramp and foam mat with foam pads randomly underneath, 6\" marc, and 12\" marc  1 min x 4 sets  10 targets  10 targets  8 targets  13 targets    Batting 8\" ball with tennis racquet, multidirection stepping  5 min.   Fast walking, 75' laps  x3    Resisted walking #15 lbs, 100' lap  x2  24.8 sec.  24.0 sec.     Blaze pods 6 targets in SoloStep with foam ramp and foam mat with foam pads randomly " "underneath  1 min x 2 sets    Batting 8\" ball with tennis racquet, multidirection stepping  5 min.    Passing soccer ball back and forth, multidirection stepping about 5 min. Fast walking, half laps 50 feet  X 4    Resisted walking 10 lbs., half 50 feetlaps  X4    Blaze pods 6 targets in SoloStep with foam ramp and foam mat with foam pads randomly underneath  1 min x 3 sets    Batting 8\" ball with tennis racquet, multidirection stepping  5 min.    Passing soccer ball back and forth, multidirection stepping about 5 min. Resisted walking 10 lbs.  100' lap  27.3 sec.  24 sec.  21.96    Blaze pods 6 targets in SoloStep with foam ramp and foam pad  1 min x 3 sets     Blaze pods 6 targets in SoloStep with foam ramp and foam mat with foam pads randomly underneath  1 min x 3 sets  9 targets  9 targets    Banded Shoulder ER \"no moneys\" with yellow theraband, about 10 reps., limited due to L SH pain    Scapular rows with red theraband   about 12 reps., limited due to L SH pain  /82    Testing above/below    Resisted walking pulling 10 lbs 150 feet x 3 set  Fast walking 150 feet x 4 sets  Best 34.1 sec (down and back)    Blaze pods 6 targets  In [facility]  Foam incline  12\" obstacle  4\" step   1 min x 3 sets    Blaze pods 6 targets  [facility]  2\" foam mat with 2 x 8\" foam underneath randomly  Foam incline  1 min x 3 sets    Batting 8\" ball with tennis racquet, multidirection stepping  4 min    Passing soccer ball back and forth, multidirection stepping about 2.5 min /84  67 bpm 99% SpO2    Testing above/below    step up foam incline  Tap blaze pods  6 targets  60 sec x 3 sets    Blaze pods   8\" step   Foam incline  On and over airex foam  1 min x 3 sets    Blaze pods   6 targets   Lateral steps over 6-12\" objects  1 min x 3 sets     FGA  20/30    Fast walking 75 feet down and back, timed  36 sec  31 sec  29.4 sec  28.6 sec    Walking collecting cones from behind, requiring turning head and looking back while " "walking  About 150 feet x 3  Walking and catching bounced ball, 150 feet x 3    Step up foam incline  Tap blaze pods  6 targets  45 sec x 3 sets  1 set carry 9 lb water weight    Passing soccer ball walking back and forth 3 min    Blaze pods in 6-12\" obstacle course  1 min x 4 sets       Outcomes below     Blaze pod reactive balance  6 targets  In [facility]  12' radius on floor  1 min x 2  Then + carry 9 lb water weight  1 min x 3    Then similar foam incline  1 min x 3 sets    Fast walking 75 feet down and back, timed  42 sec  39 sec  37 sec  40 sec   43 sec    Walk and catch ball all planes, Then similar with change of direction forwards to back, and turning 180 degrees with ball catch 2.5 min     (Ther-ex) Strengthening           (There-ex) Stretching                             Mobility Measures 6/25/24 6/20/24 6/12/24 5/22/24   5 Time Sit to Stand  (17\" chair, arms across chest) 16.3 seconds 20.1 seconds 18.2 seconds 14.7 seconds   3 Meter Timed  Up & Go   7.4 seconds 8.9 seconds   Walking speed (self-selected)       Functional Gait Assessment (see below) 22/30 18/30 19/30 12/30   6 Minute Walk Test (100 foot circular course) Completed without assistive device with close supervision only  1160 feet no falls no foot scuffing Completed without assistive device in [facility]    1116 feet no falls or loss of balance, mild foot scuffing 5 minutes into walking  87 bpm 97% SpO2      1300 feet with rolling walker    Ending heart rate 90s bpm   Patient-Reported Outcome Measure: Activities-Specific Balance Confidence Scale (ABC Scale)  89%  49%     Functional Gait Assessment  3/3 Gait level surface  3/3 Change in gait speed  2/3 Gait with horizontal head turns  1/3 Gait with vertical head turns  3/3 Gait and pivot turn  3/3 Step over obstacle  2/3 Gait with narrow base of support  3/3 Gait with eyes closed  1/3 Ambulating backwards  1/3 Steps  22/30 Total score (less than 22/30 indicates increased risk of " "fall)..    ASSESSMENT:  Jaimie is a 68 year old female with mobility limitations following CVA 5/04/24.      7/08/24  Continuing to progress with dynamic and reactive balance, walking on inclines and uneven surfaces, and walking over obstacles. Demonstrates improvement in correcting episodes of LOB while navigating uneven surfaces and dynamic and reactive balance including blaze pod targets and hitting a small 8\"ball with a tennis racquet back and forth at faster speeds. Will continue to work on balance and walking on uneven surfaces to work towards patient goals of going to the beach with her family and returning to previous level of function.       7/03/24  Similar to yesterday. Continues to progress with reactive balance and cardiovascular endurance. Worked on cardiovascular endurance activities for longer periods of time compared to previous sessions including tennis and soccer. Demonstrating improvement in L sided functional mobility during activities such as hitting backhands (one-handed, using R hand) in tennis and kicking a soccer ball with L foot but needs continued improvement with coordinating movements. Continue to challenge reactive and dynamic balance, cardiovascular endurance, and gait speed for safe ambulation. Will continue to encourage use of L sided functional activities to return to previous level of function including playing tennis and pickleball.       7/02/24  Jaimie continues to progress with reactive balance and exercise tolerance. Tolerated longer durations of tennis and soccer today. Kicked soccer ball with L foot with more accuracy more consistently today compared to previous. Will continue challenging balance and working on uneven surfaces with directional changes to work towards goals of being able to walk on the beach with family in August.      6/27/24  Jaimie is a 68 year old female with mobility limitations following CVA 5/04/24.      Jaimie shows improvement in reactive balance and gait. " "Able to complete activities with uneven surfaces with less supervision. Assessed L shoulder due to upcoming MRI. Reports L shoulder pain post-CVA with UE movement including overhead movements, full range flexion, full range ABD and ther ex including banded Shoulder ER \"no moneys\" with yellow theraband and scapular rows with red theraband. Demonstrated difficulty with keeping L elbow at side due to L shoulder pain during rows and difficulty ER L shoulder with banded exercise. 4/5 L SH ABD strength. Will continue to evaluate next visit. Continues to require physical therapy to return to previous level of function.      SHORT-TERM GOALS: by 6/05/24, continued to 7/08/24  1. Patient scores at least 16/30 on FGA.  MET.  2. Patient walks at least 1500 feet during 6 minute walk test. NOT MET.  3. Patient safely walks within the home with a cane.  MET.    LONG-TERM GOALS: by 7/22/24   1. Patient returns to prior level of community walking.  2. Patient scores at least 23/30 on FGA for safe mobility without assistive device.  3. Patient able to safely participate in walking on the beach during her family vacation in early August.     PLAN OF CARE:  Patient will benefit from physical therapy 2 times per week for 1 month  Neuromuscular re-education, therapeutic exercises, and therapeutic activities as outlined in grids.      7/08/2024  "

## 2024-07-08 NOTE — PROGRESS NOTES
"Daily Note     Today's date: 2024  Patient name: Sanam Castro  : 1955  MRN: [id number]  Referring provider: Dia Redman MD  Dx:   Encounter Diagnosis     ICD-10-CM    1. Cerebrovascular accident (CVA), unspecified mechanism (HCC)  I63.9                      Subjective: \"I am noticing when I lift my arm up to a cabinet to reach something, my hand does not get right to the item.\"      Objective: See treatment description below    Thera Act: Pt began tx session with performing in stance functional reach with midline crossing for weighted and non-weight can retrieval for placement in overhead cabinet, followed by removing each can for placement back in bag to increase hand to target precision, improved L intrinsic/distal strength, and improved L FMC/prehension.    Pt advanced to performing toothpick retrieval and placement through Styrofoam board, followed by straw retrieval and placement over top each toothpick to further challenge L FMC/prehension, hand- to target precision, and improved overall L manipulation. Pt concluded task by removing both straws and toothpicks utilizing in-hand manipulation demands of finger to palm translation to challenge speed and accuracy of in-hand manipulation abilities.    Pt concluded tx session with in-hand manipulation task of rotating/manipulating tennis ball to locate each letter on tennis ball in alphabetical order unilaterally with L hand only to improve speed and accuracy if in-hand manipulation.  Pt completed x 2 trials.      Trial #1: 4 minutes and 5 seconds  Trial #2: 5 minutes and 12 seconds    Assessment: Tolerated treatment well. Patient would benefit from continued OT    Pt demo with G response to use of visual attention to L hand when placing and retrieving cans from overhead cabinet with less ataxia evident improving overall hand to target precision.  Pt did complain of slight discomfort in L shoulder with OH reaching.  Pt has appt with Orthopedics to " further discuss L shoulder pain and abnormal findings on X-ray and MRI.  Pt demo with ongoing difficulties retrieving and manipulating items in hand unilaterally with L hand only to achieve appropriate pincer grasp with occasional droppage evident and occasional use of table top to assist with manipulation.  Pt also demo with G response to use of stabilization to improve hand to target precision to improve overall gross motor coordination and control-- Pt with less ataxia presented when utilizing stabilizing strategies. Pt presented with significant improved speed and accuracy with in-hand manipulation demands of rotating ball to identify letters written on tennis ball unilaterally with L hand only with abilities to decrease time required x 2 minutes since completed last session.     Plan: Continue per plan of care.      Ball bounces/catch unilaterally    INTERVENTION COMMENTS:  Diagnosis: Cerebrovascular accident (CVA), unspecified mechanism (HCC) [I63.9]  Precautions: fall risk  Insurance: Payor: [employer]LIN ANDRE REP / Plan: [employer] MEDICARE ADVANTRA / Product Type: Medicare HMO /   3 of 10 visits, PN due 7/27/2024

## 2024-07-09 ENCOUNTER — OFFICE VISIT (OUTPATIENT)
Dept: NEUROLOGY | Facility: CLINIC | Age: 69
End: 2024-07-09
Payer: COMMERCIAL

## 2024-07-09 VITALS
WEIGHT: 160 LBS | BODY MASS INDEX: 23.7 KG/M2 | SYSTOLIC BLOOD PRESSURE: 124 MMHG | TEMPERATURE: 95.5 F | HEIGHT: 69 IN | HEART RATE: 65 BPM | OXYGEN SATURATION: 100 % | DIASTOLIC BLOOD PRESSURE: 70 MMHG

## 2024-07-09 DIAGNOSIS — I65.21 STENOSIS OF RIGHT CAROTID ARTERY: ICD-10-CM

## 2024-07-09 DIAGNOSIS — Z98.890 HISTORY OF CEA (CAROTID ENDARTERECTOMY): ICD-10-CM

## 2024-07-09 DIAGNOSIS — E78.5 HLD (HYPERLIPIDEMIA): ICD-10-CM

## 2024-07-09 DIAGNOSIS — R06.83 SNORING: Primary | ICD-10-CM

## 2024-07-09 DIAGNOSIS — I63.9 STROKE (CEREBRUM) (HCC): ICD-10-CM

## 2024-07-09 PROBLEM — I65.29 CAROTID ARTERY STENOSIS: Status: RESOLVED | Noted: 2024-05-06 | Resolved: 2024-07-09

## 2024-07-09 PROCEDURE — 99215 OFFICE O/P EST HI 40 MIN: CPT | Performed by: PSYCHIATRY & NEUROLOGY

## 2024-07-09 PROCEDURE — G2211 COMPLEX E/M VISIT ADD ON: HCPCS | Performed by: PSYCHIATRY & NEUROLOGY

## 2024-07-09 RX ORDER — ASPIRIN 81 MG/1
81 TABLET, CHEWABLE ORAL DAILY
Qty: 30 TABLET | Refills: 0 | Status: SHIPPED | OUTPATIENT
Start: 2024-07-09 | End: 2024-08-08

## 2024-07-09 NOTE — PROGRESS NOTES
Patient ID: Sanam Castro is a 68 y.o. female.    Assessment/Plan:    This is a 67 y/o Female with hx of HTN, prior SAH in the setting of traumatic fall in 2022, anxiety who is here as a hospital follow up with right thalamic infarct and 2.5 cm extra axial densely calcified mass with associated hyperostosis lateral to the left frontal lobe. CTA head and neck revealed left ICA critical stenosis. Patient underwent left CEA after.     PLAN:      Diagnoses and all orders for this visit:    Snoring  -refer patient to sleep medicine   -     Ambulatory Referral to Sleep Medicine; Future    Stroke (cerebrum) (HCC)  -for secondary stroke prevention, recommend continuation of combination of aspirin and atorvastatin. Patient has been following vascular surgery as well   -Blood Pressure goal < 130/80, BP is at goal currently.   -LDL goal <70    Counseling/stroke education -   -I advised patient to avoid using NSAIDs for headaches or other pain and to stick to tylenol if needed  -Recommend lifestyle modifications such as mediterranean diet & regular exercise regimen atleast 4-5 times a week for 20-30 minutes.   -I educated patient/family regarding medication compliance  -encourage smoking cessation, and control of diabetes and hypertension; defer management to primary   -     aspirin 81 mg chewable tablet; Chew 1 tablet (81 mg total) daily    HLD (hyperlipidemia)    Stenosis of right carotid artery    History of CEA (carotid endarterectomy)  -on aspirin now  Patient has been following up with vascular surgery       Follow up in 3 months     I would be happy to see the patient sooner if any new questions/concerns arise.  Patient/Guardian was advised to the call the office if they have any questions and concerns in the meantime.     Patient/Guardian does understand that if they have any new stroke like symptoms such as facial droop on one side, weakness/paralysis on either side, speech trouble, numbness on one side, balance  issues, any vision changes, extreme dizziness or any new headache, to call 9-1-1 immediately or to proceed to the nearest ER immediately.      I have spent a total time of 40 minutes in caring for this patient on the day of the visit/encounter including Risks and benefits of tx options, Instructions for management, Documenting in the medical record, Reviewing / ordering tests, medicine, procedures  , Obtaining or reviewing history  , and Communicating with other healthcare professionals .     Subjective:    HPI    67 y/o M w/ hypertension, prior SAH in the setting of traumatic fall in 2022, anxiety who initially presented to the ER on 5/5/2024.  He had a CT head at that time which was negative for any acute findings but did show an extra-axial mass.  CTA head and neck did show left ICA critical stenosis.  Patient was admitted for stroke workup MRI brain showed a right thalamic stroke which was thought to be related to small vessel disease.  Since he did have left ICA stenosis which is critical patient was seen and vascular surgery undergo left CEA.  Patient was then started on aspirin and Plavix for 21 days and was recommended to switch to aspirin monotherapy.    He is here and he is otherwise doing really well does not have any new TIA or CVA-like symptoms he does have some mild left-sided tingling numbness which can be painful and does have some occipital neuralgia with it.  But otherwise no falls recently he states that he is healing well from his left CEA he is continuing to follow-up with vascular surgery.    The following portions of the patient's history were reviewed and updated as appropriate: She  has a past medical history of History of ovarian cyst and Melanoma of shoulder, right (HCC).  She   Patient Active Problem List    Diagnosis Date Noted    History of CEA (carotid endarterectomy) 07/11/2024    HLD (hyperlipidemia) 05/07/2024    Carotid artery stenosis 05/06/2024    Aortic ectasia (HCC) 05/05/2024     Stroke (cerebrum) (HCC) 05/04/2024    Meningioma (HCC) 05/04/2024    Overweight with body mass index (BMI) of 26 to 26.9 in adult 05/27/2022    Primary hypertension 05/27/2022    Seasonal allergies 04/19/2022    Family history of diabetes mellitus (DM) 04/06/2022    Subarachnoid hemorrhage following injury, no loss of consciousness (HCC) 04/05/2022    Contusion of left side of back 04/05/2022     She  has a past surgical history that includes Ovarian cyst removal (Left).  Her family history includes Diabetes in her father; Heart murmur in her father; No Known Problems in her mother, son, and son.  She  reports that she quit smoking about 7 years ago. Her smoking use included cigarettes. She started smoking about 39 years ago. She has a 15.8 pack-year smoking history. She has never used smokeless tobacco. She reports current alcohol use of about 2.0 standard drinks of alcohol per week. She reports that she does not currently use drugs.  Current Outpatient Medications   Medication Sig Dispense Refill    amLODIPine-benazepril (LOTREL 5-10) 5-10 MG per capsule Take 1 capsule by mouth daily 30 capsule 0    aspirin 81 mg chewable tablet Chew 1 tablet (81 mg total) daily 30 tablet 0    atorvastatin (LIPITOR) 40 mg tablet Take 1 tablet (40 mg total) by mouth every evening 30 tablet 0    Calcium Carb-Cholecalciferol (Caltrate 600+D3) 600-800 MG-UNIT TABS Take 1 tablet by mouth in the morning      fluticasone (FLONASE) 50 mcg/act nasal spray 2 sprays into each nostril daily      magnesium citrate (CITROMA) 1.745 g/30 mL oral solution Take 148 mL by mouth once as needed (Constipation) for up to 1 dose 296 mL 0    Multiple Vitamins-Minerals (Centrum Silver 50+Women) TABS Take 1 tablet by mouth daily      polyethylene glycol (MIRALAX) 17 g packet Take 17 g by mouth daily as needed (Constipation) 10 each 0    clopidogrel (PLAVIX) 75 mg tablet Take 1 tablet (75 mg total) by mouth daily for 21 days Do not start before May 8,  "2024. (Patient not taking: Reported on 7/9/2024) 21 tablet 0    docusate sodium (COLACE) 100 mg capsule Take 1 capsule (100 mg total) by mouth every 12 (twelve) hours as needed for constipation (Patient not taking: Reported on 7/9/2024) 60 capsule 0     No current facility-administered medications for this visit.     Current Outpatient Medications on File Prior to Visit   Medication Sig    amLODIPine-benazepril (LOTREL 5-10) 5-10 MG per capsule Take 1 capsule by mouth daily    atorvastatin (LIPITOR) 40 mg tablet Take 1 tablet (40 mg total) by mouth every evening    Calcium Carb-Cholecalciferol (Caltrate 600+D3) 600-800 MG-UNIT TABS Take 1 tablet by mouth in the morning    fluticasone (FLONASE) 50 mcg/act nasal spray 2 sprays into each nostril daily    magnesium citrate (CITROMA) 1.745 g/30 mL oral solution Take 148 mL by mouth once as needed (Constipation) for up to 1 dose    Multiple Vitamins-Minerals (Centrum Silver 50+Women) TABS Take 1 tablet by mouth daily    polyethylene glycol (MIRALAX) 17 g packet Take 17 g by mouth daily as needed (Constipation)    clopidogrel (PLAVIX) 75 mg tablet Take 1 tablet (75 mg total) by mouth daily for 21 days Do not start before May 8, 2024. (Patient not taking: Reported on 7/9/2024)    docusate sodium (COLACE) 100 mg capsule Take 1 capsule (100 mg total) by mouth every 12 (twelve) hours as needed for constipation (Patient not taking: Reported on 7/9/2024)     No current facility-administered medications on file prior to visit.     She is allergic to pollen extract..    Objective:    Blood pressure 124/70, pulse 65, temperature (!) 95.5 °F (35.3 °C), temperature source Temporal, height 5' 9\" (1.753 m), weight 72.6 kg (160 lb), SpO2 100%, not currently breastfeeding.    Physical Exam  General - patient is alert   Speech - no dysarthria noted, no aphasia noted.     Neuro:   Cranial nerves: PERRL, EOMI, facial sensation intact to soft touch in V1, V2 and V3, no facial asymmetry " noted, uvula/palate midline, tongue midline.   Motor: 5/5 throughout, normal tone, no pronator drift noted.   Sensory - intact to soft touch throughout  Reflexes - 2+ throughout  Coordination - no ataxia/dysmetria noted  Gait - normal      ROS:  Reviewed ROS  Review of Systems   Constitutional: Negative.    HENT: Negative.     Eyes: Negative.    Respiratory: Negative.     Cardiovascular: Negative.    Gastrointestinal: Negative.    Endocrine: Negative.    Genitourinary: Negative.    Musculoskeletal: Negative.    Skin: Negative.    Allergic/Immunologic: Negative.    Neurological:  Positive for weakness (at the knees).   Hematological: Negative.    Psychiatric/Behavioral: Negative.     All other systems reviewed and are negative.

## 2024-07-10 ENCOUNTER — OFFICE VISIT (OUTPATIENT)
Dept: OCCUPATIONAL THERAPY | Facility: REHABILITATION | Age: 69
End: 2024-07-10
Payer: COMMERCIAL

## 2024-07-10 ENCOUNTER — OFFICE VISIT (OUTPATIENT)
Facility: REHABILITATION | Age: 69
End: 2024-07-10
Payer: COMMERCIAL

## 2024-07-10 DIAGNOSIS — I63.9 CEREBROVASCULAR ACCIDENT (CVA), UNSPECIFIED MECHANISM (HCC): Primary | ICD-10-CM

## 2024-07-10 PROCEDURE — 97530 THERAPEUTIC ACTIVITIES: CPT | Performed by: OCCUPATIONAL THERAPIST

## 2024-07-10 PROCEDURE — 97112 NEUROMUSCULAR REEDUCATION: CPT

## 2024-07-10 NOTE — PROGRESS NOTES
PHYSICAL THERAPY TREATMENT     Date: 24  Name: Sanam Castro  : 1955  Referring Provider: Dia Redman MD  AUTHORIZATION:   Insurance: Payor: [employer]  REP / Plan: [facility] MEDICARE [employer]VANTRA / Product Type: Medicare HMO /     SUBJECTIVE:  HPI: Sanam Castro is a 68 y.o. female referred to outpatient physical therapy for the following diagnosis   Encounter Diagnosis   Name Primary?    Cerebrovascular accident (CVA), unspecified mechanism (HCC) Yes     7/10   Reports that she is doing well. Has been using no device while in the home. Brings in MRI report - has new referral for addition of shoulder pain.     24  Reports continuing to work on walking outside of PT. L shoulder continues to bother her. Appointment to orthopedic tomorrow to discuss L shoulder MRI.     24  Reports fatigue after last session. No update on L shoulder MRI yet.    24  Reports continuing to work on walking outside of physical therapy. Got MRI for L shoulder this week and returns to ortho next week.    24  Reports feeling about the same after last visit including continued difficulty going to sleep since stroke. Reports going to more doctors appointments since last visit and needing new glasses for both reading and distance. Reports ongoing L shoulder pain post-CVA especially with overhead movements. Upcoming MRI for L shoulder ordered by PCP on  and  follow up visit with ortho. Follow up visit with vascular surgeon scheduled for Aug 22.  Doing better with mobility, reports has more energy so is organizing things at home. Will go to [place] in August.      Patient-Specific Functional Scale   Task is scored 0 (unable to perform activity) to 10 (ability to perform activity independently)  Activity     Get back to being active.     2.   Not being reliant on the walker.     3.   Return to driving.         OBJECTIVE:    Precautions - fall risk    24  Continuing to work on  Pt is A X O x4 and A x 2 and still in bed. PIV SL. Turn and repo Q2H; Strict I and O. Reg Diet; Rcocker Cath in place. Generalized bruised skin. Complained of lower back pain managed with dilaudid. VSS except for HTN. Takes pills with apple sause. Plan is for spine surgery consult today.    "increasing gait speed, improving dynamic and reactive balance, and walking on uneven surfaces with obstacles to be able to safely and confidently go to the beach with her family and reduce risk of falls. Held L shoulder exercises today.     7/03/24  Continue to work on increasing gait speed, dynamic and reactive balance, and walking on uneven surfaces to meet patient goals of going to the beach. Held exercises involving L shoulder today.    7/02/24  Continue to challenge balance and work on gait speed. Therapeutic activities with uneven surfaces to mimic sand at beach.    6/27/24  Patient reports pain and difficulty with L shoulder overhead movements including end range active flexion and abduction. Reports similar pain with reaching and grabbing objects overhead in cabinets at home. Does not demonstrate painful arc until closer to end range. Reports pain with active shoulder elevation, with the most pain at end range. Demonstrated b/l shoulder abduction weakness 4/5 MMT.  Some tenderness to palpation around AC joint area. Reports pain and feeling \"separation\" at joint during L shoulder resisted ER. Difficulty with maintaining L arm at side during scapular retraction due to pain.     Specialty Treatment Diary  7/10 7/08/24 7/03/24 7/02/24 6/27/24 6/25/24 6/20/24 6/18/24 6/12/24   Home exercise program            (Ther-ex, neuromuscular re-ed)Walking/endurance            (Neuromuscular re-ed) Static and dynamic balance/anticipatory / reactive Fwd/bwd 20' ea 5x laps SOLO     SOLO hurdles 5x fwd and lat 12\"     Foam beams tandem and lateral 4x laps ea     Obstacle course foam beams with two 12\" hurdles and 6\" curb step with blazepods   Fwd and lat ea with addition of A-Z dual task     Sit <> stands counting down from 100 by 7s with tidal sphere     Soccer ball pass fwd/bwd x5 mins     Tennis volley x5 mins  Overground walking, 100' laps  x3  21.19 sec.  23.37 sec.  23.15 sec.    Overground walking, 50' laps   x2  11.12 " "sec.  9.34 sec.    Resisted walking, 50' laps with #15  x3  27 sec.  24.6 sec.  22.8 sec.     Blaze pods 6 targets in [facility] with foam ramp and foam mat with foam pads randomly underneath, 6\" marc, and 12\" marc  1 min x 4 sets  10 targets  10 targets  8 targets  13 targets    Batting 8\" ball with tennis racquet, multidirection stepping  5 min.   Fast walking, 75' laps  x3    Resisted walking #15 lbs, 100' lap  x2  24.8 sec.  24.0 sec.     Blaze pods 6 targets in [facility] with foam ramp and foam mat with foam pads randomly underneath  1 min x 2 sets    Batting 8\" ball with tennis racquet, multidirection stepping  5 min.    Passing soccer ball back and forth, multidirection stepping about 5 min. Fast walking, half laps 50 feet  X 4    Resisted walking 10 lbs., half 50 feetlaps  X4    Blaze pods 6 targets in [facility] with foam ramp and foam mat with foam pads randomly underneath  1 min x 3 sets    Batting 8\" ball with tennis racquet, multidirection stepping  5 min.    Passing soccer ball back and forth, multidirection stepping about 5 min. Resisted walking 10 lbs.  100' lap  27.3 sec.  24 sec.  21.96    Blaze pods 6 targets in [facility] with foam ramp and foam pad  1 min x 3 sets     Blaze pods 6 targets in [facility] with foam ramp and foam mat with foam pads randomly underneath  1 min x 3 sets  9 targets  9 targets    Banded Shoulder ER \"no moneys\" with yellow theraband, about 10 reps., limited due to L SH pain    Scapular rows with red theraband   about 12 reps., limited due to L SH pain  /82    Testing above/below    Resisted walking pulling 10 lbs 150 feet x 3 set  Fast walking 150 feet x 4 sets  Best 34.1 sec (down and back)    Blaze pods 6 targets  In [facility]  Foam incline  12\" obstacle  4\" step   1 min x 3 sets    Blaze pods 6 targets  In [facility]  2\" foam mat with 2 x 8\" foam underneath randomly  Foam incline  1 min x 3 sets    Batting 8\" ball with tennis racquet, multidirection stepping  4 " "min    Passing soccer ball back and forth, multidirection stepping about 2.5 min /84  67 bpm 99% SpO2    Testing above/below    step up foam incline  Tap blaze pods  6 targets  60 sec x 3 sets    Blaze pods   8\" step   Foam incline  On and over airex foam  1 min x 3 sets    Blaze pods   6 targets   Lateral steps over 6-12\" objects  1 min x 3 sets     FGA  20/30    Fast walking 75 feet down and back, timed  36 sec  31 sec  29.4 sec  28.6 sec    Walking collecting cones from behind, requiring turning head and looking back while walking  About 150 feet x 3  Walking and catching bounced ball, 150 feet x 3    Step up foam incline  Tap blaze pods  6 targets  45 sec x 3 sets  1 set carry 9 lb water weight    Passing soccer ball walking back and forth 3 min    Blaze pods in 6-12\" obstacle course  1 min x 4 sets       Outcomes below     Blaze pod reactive balance  6 targets  In [facility]  12' radius on floor  1 min x 2  Then + carry 9 lb water weight  1 min x 3    Then similar foam incline  1 min x 3 sets    Fast walking 75 feet down and back, timed  42 sec  39 sec  37 sec  40 sec   43 sec    Walk and catch ball all planes, Then similar with change of direction forwards to back, and turning 180 degrees with ball catch 2.5 min     (Ther-ex) Strengthening            (There-ex) Stretching                               Mobility Measures 6/25/24 6/20/24 6/12/24 5/22/24   5 Time Sit to Stand  (17\" chair, arms across chest) 16.3 seconds 20.1 seconds 18.2 seconds 14.7 seconds   3 Meter Timed  Up & Go   7.4 seconds 8.9 seconds   Walking speed (self-selected)       Functional Gait Assessment (see below) 22/30 18/30 19/30 12/30   6 Minute Walk Test (100 foot circular course) Completed without assistive device with close supervision only  1160 feet no falls no foot scuffing Completed without assistive device in [facility]    1116 feet no falls or loss of balance, mild foot scuffing 5 minutes into walking  87 bpm 97% SpO2      1300 " "feet with rolling walker    Ending heart rate 90s bpm   Patient-Reported Outcome Measure: Activities-Specific Balance Confidence Scale (ABC Scale)  89%  49%     Functional Gait Assessment  3/3 Gait level surface  3/3 Change in gait speed  2/3 Gait with horizontal head turns  1/3 Gait with vertical head turns  3/3 Gait and pivot turn  3/3 Step over obstacle  2/3 Gait with narrow base of support  3/3 Gait with eyes closed  1/3 Ambulating backwards  1/3 Steps  22/30 Total score (less than 22/30 indicates increased risk of fall)..    ASSESSMENT:  Jaimie is a 68 year old female with mobility limitations following CVA 5/04/24.      7/10/24   Had a high degree of difficulty with performance of foam beams. Did much better hurdles today per patient, able to traverse the 12\"  hurdles without much loss of balance. Able to use a mix of hip and stepping strategy to correct his balance. Used SOLO throughout session for fall prevention. Had some difficulty with dual tasking, would benefit from this being incorporated throughout treatment plans. Would probably try treadmill again as patient has made some great progress and would tolerate this better. Appropriate and challenging RPE throughout session.     7/08/24  Continuing to progress with dynamic and reactive balance, walking on inclines and uneven surfaces, and walking over obstacles. Demonstrates improvement in correcting episodes of LOB while navigating uneven surfaces and dynamic and reactive balance including blaze pod targets and hitting a small 8\"ball with a tennis racquet back and forth at faster speeds. Will continue to work on balance and walking on uneven surfaces to work towards patient goals of going to the beach with her family and returning to previous level of function.       7/03/24  Similar to yesterday. Continues to progress with reactive balance and cardiovascular endurance. Worked on cardiovascular endurance activities for longer periods of time compared to " "previous sessions including tennis and soccer. Demonstrating improvement in L sided functional mobility during activities such as hitting backhands (one-handed, using R hand) in tennis and kicking a soccer ball with L foot but needs continued improvement with coordinating movements. Continue to challenge reactive and dynamic balance, cardiovascular endurance, and gait speed for safe ambulation. Will continue to encourage use of L sided functional activities to return to previous level of function including playing tennis and pickleball.       7/02/24  Jaimie continues to progress with reactive balance and exercise tolerance. Tolerated longer durations of tennis and soccer today. Kicked soccer ball with L foot with more accuracy more consistently today compared to previous. Will continue challenging balance and working on uneven surfaces with directional changes to work towards goals of being able to walk on the beach with family in August.      6/27/24  Jamiie is a 68 year old female with mobility limitations following CVA 5/04/24.      Jaimie shows improvement in reactive balance and gait. Able to complete activities with uneven surfaces with less supervision. Assessed L shoulder due to upcoming MRI. Reports L shoulder pain post-CVA with UE movement including overhead movements, full range flexion, full range ABD and ther ex including banded Shoulder ER \"no moneys\" with yellow theraband and scapular rows with red theraband. Demonstrated difficulty with keeping L elbow at side due to L shoulder pain during rows and difficulty ER L shoulder with banded exercise. 4/5 L SH ABD strength. Will continue to evaluate next visit. Continues to require physical therapy to return to previous level of function.      SHORT-TERM GOALS: by 6/05/24, continued to 7/08/24  1. Patient scores at least 16/30 on FGA.  MET.  2. Patient walks at least 1500 feet during 6 minute walk test. NOT MET.  3. Patient safely walks within the home with a " cane.  MET.    LONG-TERM GOALS: by 7/22/24   1. Patient returns to prior level of community walking.  2. Patient scores at least 23/30 on FGA for safe mobility without assistive device.  3. Patient able to safely participate in walking on the beach during her family vacation in early August.     PLAN OF CARE:  Patient will benefit from physical therapy 2 times per week for 1 month  Neuromuscular re-education, therapeutic exercises, and therapeutic activities as outlined in grids.      7/08/2024

## 2024-07-10 NOTE — PROGRESS NOTES
Daily Note     Today's date: 7/10/2024  Patient name: Saanm Castro  : 1955  MRN: [id number]  Referring provider: Dia Redman MD  Dx:   Encounter Diagnosis     ICD-10-CM    1. Cerebrovascular accident (CVA), unspecified mechanism (HCC)  I63.9                      Subjective: Pt had visit with orthopedic surgeon who is recommending Pt to complete Ortho PT to further assess and treat ongoing pain in L shoulder.        Objective: See treatment description below    Thera Act: Pt began tx session with performing various hand-eye coordination activities with tennis ball and basketball to improved hand-eye coordination with LUE, improved hand to target precision with less dysmetria, improved LUE gross motor and fine motor coordination, and improved reaction time of LUE.  Pt completed the following:    Tennis ball bounce/catch to floor alternating between pronation and supination x 20 reps  Tennis ball bounce/catch alternating between R hand and L hand x 20 reps  Tennis ball bounce/catch with forearm maintained in pronated position-- unable to complete  Basketball bounce/catch with BUE to challenge bilateral coordination x 20 reps and x 20 reps with use of mirror as visual feedback  Basketball dribble with LUE only x 20 reps  Tennis ball catch/toss to OTR unilaterally with L hand only x 20 reps    Pt advanced to performing in stance functional reach for dowel retrieval and placement on wooden rods placed at eye level in PNF D1 flexion/extension/movement planes with calibrated hand gripper at level 2 resistance for improved gross motor control of LUE, hand to target precision, increased L distal strength/endurance, and improved prehension patterns.    Pt concluded tx session with performing x 5 minutes of in-hand manipulation demands of rotating x 2 golf balls unilaterally with L hand only to challenge L in-hand manipulation speed and accuracy.     Assessment: Tolerated treatment well. Patient would benefit  from continued OT    Pt being referred to Ortho PT at this time 2* ongoing L shoulder pain/discomfort with AROM-- Pt to be scheduled.  Pt demo with initial max difficulties with tennis ball bounce/catch alternating between pronation and supination forearm position with frequent droppage/loss control, though improvement evident as activity persisted.  Pt unable to catch ball when forearm maintained in pronated position 2* lack of hand to target precision and delayed grasp speed.  Pt demo significant improved control of tennis ball when alternating between R hand and L hand with significant less droppage evident.  Pt demo with excellent bilateral coordination when bouncing and catching basketball without motor lag of LUE with and without use of mirror as visual feedback.  Pt demo with max difficulties dribbling basketball 2* lack of LUE gross motor control and hand to target precision with inabilities to dribble past 6 dribbles prior to loss control of ball.  Pt demo with Max difficulties catching tennis ball unilaterally with L hand only with frequent droppage evident.  Pt demo with less dysmetria with hand to target taps for placement of dowel on wooden meaghan with calibrated hand gripper with reduced ataxia evident throughout.  Pt continues to present with bradykinetic pace of in-hand manipulation with increased visual feedback required.      Plan: Continue per plan of care.      Ball bounces/catch unilaterally    INTERVENTION COMMENTS:  Diagnosis: Cerebrovascular accident (CVA), unspecified mechanism (HCC) [I63.9]  Precautions: fall risk  Insurance: Payor: [employer] REP / Plan: [employer] ADVANTRA / Product Type: Medicare HMO /   4 of 10 visits, PN due 7/27/2024

## 2024-07-11 PROBLEM — Z98.890 HISTORY OF CEA (CAROTID ENDARTERECTOMY): Status: ACTIVE | Noted: 2024-07-11

## 2024-07-15 ENCOUNTER — OFFICE VISIT (OUTPATIENT)
Dept: OCCUPATIONAL THERAPY | Facility: REHABILITATION | Age: 69
End: 2024-07-15
Payer: COMMERCIAL

## 2024-07-15 ENCOUNTER — OFFICE VISIT (OUTPATIENT)
Facility: REHABILITATION | Age: 69
End: 2024-07-15
Payer: COMMERCIAL

## 2024-07-15 DIAGNOSIS — I63.9 CEREBROVASCULAR ACCIDENT (CVA), UNSPECIFIED MECHANISM (HCC): Primary | ICD-10-CM

## 2024-07-15 PROCEDURE — 97530 THERAPEUTIC ACTIVITIES: CPT | Performed by: OCCUPATIONAL THERAPIST

## 2024-07-15 PROCEDURE — 97530 THERAPEUTIC ACTIVITIES: CPT | Performed by: PHYSICAL THERAPIST

## 2024-07-15 NOTE — PROGRESS NOTES
PHYSICAL THERAPY TREATMENT     Date: 07/15/24  Name: Sanam Castro  : 1955  Referring Provider: Dia Redman MD  AUTHORIZATION:   Insurance: Payor: ORLIN  REP / Plan: [employer] ADVANTRA / Product Type: Medicare HMO /     SUBJECTIVE:  HPI: Sanam Castro is a 68 y.o. female referred to outpatient physical therapy for the following diagnosis   Encounter Diagnosis   Name Primary?    Cerebrovascular accident (CVA), unspecified mechanism (HCC) Yes     7/15/24  Only uses walker for community distances. Does not use any assistive device at home. Wants to discuss returning to driving with OT.      7/10   Reports that she is doing well. Has been using no device while in the home. Brings in MRI report - has new referral for addition of shoulder pain.     24  Reports continuing to work on walking outside of PT. L shoulder continues to bother her. Appointment to orthopedic tomorrow to discuss L shoulder MRI.     24  Reports fatigue after last session. No update on L shoulder MRI yet.    24  Reports continuing to work on walking outside of physical therapy. Got MRI for L shoulder this week and returns to ortho next week.    24  Reports feeling about the same after last visit including continued difficulty going to sleep since stroke. Reports going to more doctors appointments since last visit and needing new glasses for both reading and distance. Reports ongoing L shoulder pain post-CVA especially with overhead movements. Upcoming MRI for L shoulder ordered by PCP on  and  follow up visit with ortho. Follow up visit with vascular surgeon scheduled for Aug 22.  Doing better with mobility, reports has more energy so is organizing things at home. Will go to [place] in August.      Patient-Specific Functional Scale   Task is scored 0 (unable to perform activity) to 10 (ability to perform activity independently)  Activity     Get back to being active.     2.   Not  "being reliant on the walker.     3.   Return to driving.         OBJECTIVE:    Precautions - fall risk    7/08/24  Continuing to work on increasing gait speed, improving dynamic and reactive balance, and walking on uneven surfaces with obstacles to be able to safely and confidently go to the beach with her family and reduce risk of falls. Held L shoulder exercises today.     7/03/24  Continue to work on increasing gait speed, dynamic and reactive balance, and walking on uneven surfaces to meet patient goals of going to the beach. Held exercises involving L shoulder today.    7/02/24  Continue to challenge balance and work on gait speed. Therapeutic activities with uneven surfaces to mimic sand at beach.    6/27/24  Patient reports pain and difficulty with L shoulder overhead movements including end range active flexion and abduction. Reports similar pain with reaching and grabbing objects overhead in cabinets at home. Does not demonstrate painful arc until closer to end range. Reports pain with active shoulder elevation, with the most pain at end range. Demonstrated b/l shoulder abduction weakness 4/5 MMT.  Some tenderness to palpation around AC joint area. Reports pain and feeling \"separation\" at joint during L shoulder resisted ER. Difficulty with maintaining L arm at side during scapular retraction due to pain.     Specialty Treatment Diary  7/15/24 7/10/24 7/08/24 7/03/24 7/02/24 6/27/24 6/25/24 6/20/24 6/18/24 6/12/24   Home exercise program             (Ther-ex, neuromuscular re-ed)Walking/endurance             (Neuromuscular re-ed) Static and dynamic balance/anticipatory / reactive Overground walking, 75' laps  x3    Overground walking 100' laps with peripheral disc grabs  x4 laps     Resisted overground walking #20  75 ft.  x4  Fastest time: 27.2 sec.     Blaze pods 6 targets in SoloStep with foam ramp, 2 12\" obstacles, 2 foam air ex pads  1 min. 15 sec. x 3 sets    Blaze pods with foam mat with foam pads " "randomly underneath  1 min. 15 sec. x 2 sets    Blaze pods (6 targets) with   4 12\" obstacles   2 air ex foam pads   1 min. 15 sec. x 2 sets Fwd/bwd 20' ea 5x laps SOLO     SOLO hurdles 5x fwd and lat 12\"     Foam beams tandem and lateral 4x laps ea     Obstacle course foam beams with two 12\" hurdles and 6\" curb step with blazepods   Fwd and lat ea with addition of A-Z dual task     Sit <> stands counting down from 100 by 7s with tidal sphere     Soccer ball pass fwd/bwd x5 mins     Tennis volley x5 mins  Overground walking, 100' laps  x3  21.19 sec.  23.37 sec.  23.15 sec.    Overground walking, 50' laps   x2  11.12 sec.  9.34 sec.    Resisted walking, 50' laps with #15  x3  27 sec.  24.6 sec.  22.8 sec.     Blaze pods 6 targets in [facility] with foam ramp and foam mat with foam pads randomly underneath, 6\" marc, and 12\" marc  1 min x 4 sets  10 targets  10 targets  8 targets  13 targets    Batting 8\" ball with tennis racquet, multidirection stepping  5 min.   Fast walking, 75' laps  x3    Resisted walking #15 lbs, 100' lap  x2  24.8 sec.  24.0 sec.     Blaze pods 6 targets in [facility] with foam ramp and foam mat with foam pads randomly underneath  1 min x 2 sets    Batting 8\" ball with tennis racquet, multidirection stepping  5 min.    Passing soccer ball back and forth, multidirection stepping about 5 min. Fast walking, half laps 50 feet  X 4    Resisted walking 10 lbs., half 50 feetlaps  X4    Blaze pods 6 targets in [facility] with foam ramp and foam mat with foam pads randomly underneath  1 min x 3 sets    Batting 8\" ball with tennis racquet, multidirection stepping  5 min.    Passing soccer ball back and forth, multidirection stepping about 5 min. Resisted walking 10 lbs.  100' lap  27.3 sec.  24 sec.  21.96    Blaze pods 6 targets in [facility] with foam ramp and foam pad  1 min x 3 sets     Blaze pods 6 targets in SoloStep with foam ramp and foam mat with foam pads randomly underneath  1 min x 3 sets  9 " "targets  9 targets    Banded Shoulder ER \"no moneys\" with yellow theraband, about 10 reps., limited due to L SH pain    Scapular rows with red theraband   about 12 reps., limited due to L SH pain  /82    Testing above/below    Resisted walking pulling 10 lbs 150 feet x 3 set  Fast walking 150 feet x 4 sets  Best 34.1 sec (down and back)    Blaze pods 6 targets  In [facility]  Foam incline  12\" obstacle  4\" step   1 min x 3 sets    Blaze pods 6 targets  In [facility]  2\" foam mat with 2 x 8\" foam underneath randomly  Foam incline  1 min x 3 sets    Batting 8\" ball with tennis racquet, multidirection stepping  4 min    Passing soccer ball back and forth, multidirection stepping about 2.5 min /84  67 bpm 99% SpO2    Testing above/below    step up foam incline  Tap blaze pods  6 targets  60 sec x 3 sets    Blaze pods   8\" step   Foam incline  On and over airex foam  1 min x 3 sets    Blaze pods   6 targets   Lateral steps over 6-12\" objects  1 min x 3 sets     FGA  20/30    Fast walking 75 feet down and back, timed  36 sec  31 sec  29.4 sec  28.6 sec    Walking collecting cones from behind, requiring turning head and looking back while walking  About 150 feet x 3  Walking and catching bounced ball, 150 feet x 3    Step up foam incline  Tap blaze pods  6 targets  45 sec x 3 sets  1 set carry 9 lb water weight    Passing soccer ball walking back and forth 3 min    Blaze pods in 6-12\" obstacle course  1 min x 4 sets       Outcomes below     Blaze pod reactive balance  6 targets  In [facility]  12' radius on floor  1 min x 2  Then + carry 9 lb water weight  1 min x 3    Then similar foam incline  1 min x 3 sets    Fast walking 75 feet down and back, timed  42 sec  39 sec  37 sec  40 sec   43 sec    Walk and catch ball all planes, Then similar with change of direction forwards to back, and turning 180 degrees with ball catch 2.5 min     (Ther-ex) Strengthening             (There-ex) Stretching                        " "         Mobility Measures 6/25/24 6/20/24 6/12/24 5/22/24   5 Time Sit to Stand  (17\" chair, arms across chest) 16.3 seconds 20.1 seconds 18.2 seconds 14.7 seconds   3 Meter Timed  Up & Go   7.4 seconds 8.9 seconds   Walking speed (self-selected)       Functional Gait Assessment (see below) 22/30 18/30 19/30 12/30   6 Minute Walk Test (100 foot circular course) Completed without assistive device with close supervision only  1160 feet no falls no foot scuffing Completed without assistive device in solostep    1116 feet no falls or loss of balance, mild foot scuffing 5 minutes into walking  87 bpm 97% SpO2      1300 feet with rolling walker    Ending heart rate 90s bpm   Patient-Reported Outcome Measure: Activities-Specific Balance Confidence Scale (ABC Scale)  89%  49%     Functional Gait Assessment  3/3 Gait level surface  3/3 Change in gait speed  2/3 Gait with horizontal head turns  1/3 Gait with vertical head turns  3/3 Gait and pivot turn  3/3 Step over obstacle  2/3 Gait with narrow base of support  3/3 Gait with eyes closed  1/3 Ambulating backwards  1/3 Steps  22/30 Total score (less than 22/30 indicates increased risk of fall)..    ASSESSMENT:  Jaimie is a 68 year old female with mobility limitations following CVA 5/04/24.      7/15/24  Continued difficulty navigating obstacles with uneven surfaces. Modified obstacle course to allow for a space between 12\" obstacles and air ex foam pads. Will continue to work on dynamic and reactive balance, walking with narrow base of support. Will do treadmill and incorporate dual tasks next session, per previous session notes.     7/10/24   Had a high degree of difficulty with performance of foam beams. Did much better hurdles today per patient, able to traverse the 12\"  hurdles without much loss of balance. Able to use a mix of hip and stepping strategy to correct his balance. Used SOLO throughout session for fall prevention. Had some difficulty with dual tasking, would " "benefit from this being incorporated throughout treatment plans. Would probably try treadmill again as patient has made some great progress and would tolerate this better. Appropriate and challenging RPE throughout session.     7/08/24  Continuing to progress with dynamic and reactive balance, walking on inclines and uneven surfaces, and walking over obstacles. Demonstrates improvement in correcting episodes of LOB while navigating uneven surfaces and dynamic and reactive balance including blaze pod targets and hitting a small 8\"ball with a tennis racquet back and forth at faster speeds. Will continue to work on balance and walking on uneven surfaces to work towards patient goals of going to the beach with her family and returning to previous level of function.       7/03/24  Similar to yesterday. Continues to progress with reactive balance and cardiovascular endurance. Worked on cardiovascular endurance activities for longer periods of time compared to previous sessions including tennis and soccer. Demonstrating improvement in L sided functional mobility during activities such as hitting backhands (one-handed, using R hand) in tennis and kicking a soccer ball with L foot but needs continued improvement with coordinating movements. Continue to challenge reactive and dynamic balance, cardiovascular endurance, and gait speed for safe ambulation. Will continue to encourage use of L sided functional activities to return to previous level of function including playing tennis and pickleball.       7/02/24  Jaimie continues to progress with reactive balance and exercise tolerance. Tolerated longer durations of tennis and soccer today. Kicked soccer ball with L foot with more accuracy more consistently today compared to previous. Will continue challenging balance and working on uneven surfaces with directional changes to work towards goals of being able to walk on the beach with family in August.      6/27/24  Jaimie is a 68 " "year old female with mobility limitations following CVA 5/04/24.      Jaimie shows improvement in reactive balance and gait. Able to complete activities with uneven surfaces with less supervision. Assessed L shoulder due to upcoming MRI. Reports L shoulder pain post-CVA with UE movement including overhead movements, full range flexion, full range ABD and ther ex including banded Shoulder ER \"no moneys\" with yellow theraband and scapular rows with red theraband. Demonstrated difficulty with keeping L elbow at side due to L shoulder pain during rows and difficulty ER L shoulder with banded exercise. 4/5 L SH ABD strength. Will continue to evaluate next visit. Continues to require physical therapy to return to previous level of function.      SHORT-TERM GOALS: by 6/05/24, continued to 7/08/24  1. Patient scores at least 16/30 on FGA.  MET.  2. Patient walks at least 1500 feet during 6 minute walk test. NOT MET.  3. Patient safely walks within the home with a cane.  MET.    LONG-TERM GOALS: by 7/22/24   1. Patient returns to prior level of community walking.  2. Patient scores at least 23/30 on FGA for safe mobility without assistive device.  3. Patient able to safely participate in walking on the beach during her family vacation in early August.     PLAN OF CARE:  Patient will benefit from physical therapy 2 times per week for 1 month  Neuromuscular re-education, therapeutic exercises, and therapeutic activities as outlined in grids.    Treatment performed by Marycruz Zimmer, FRANCESCO, under direct supervision of Gerald Galvez PT.    "

## 2024-07-17 ENCOUNTER — TELEPHONE (OUTPATIENT)
Dept: FAMILY MEDICINE CLINIC | Facility: CLINIC | Age: 69
End: 2024-07-17

## 2024-07-18 ENCOUNTER — OFFICE VISIT (OUTPATIENT)
Dept: OCCUPATIONAL THERAPY | Facility: REHABILITATION | Age: 69
End: 2024-07-18
Payer: COMMERCIAL

## 2024-07-18 ENCOUNTER — OFFICE VISIT (OUTPATIENT)
Facility: REHABILITATION | Age: 69
End: 2024-07-18
Payer: COMMERCIAL

## 2024-07-18 DIAGNOSIS — I63.9 CEREBROVASCULAR ACCIDENT (CVA), UNSPECIFIED MECHANISM (HCC): Primary | ICD-10-CM

## 2024-07-18 PROCEDURE — 97530 THERAPEUTIC ACTIVITIES: CPT | Performed by: OCCUPATIONAL THERAPIST

## 2024-07-18 PROCEDURE — 97530 THERAPEUTIC ACTIVITIES: CPT | Performed by: PHYSICAL THERAPIST

## 2024-07-18 PROCEDURE — 97112 NEUROMUSCULAR REEDUCATION: CPT | Performed by: PHYSICAL THERAPIST

## 2024-07-18 NOTE — PROGRESS NOTES
"Daily Note     Today's date: 2024  Patient name: Sanam Castro  : 1955  MRN: [id number]  Referring provider: Dia Redman MD  Dx:   Encounter Diagnosis     ICD-10-CM    1. Cerebrovascular accident (CVA), unspecified mechanism (HCC)  I63.9                      Subjective: \"Look, I can finally do it.\"      Objective: See treatment description below    Thera Act: Pt began tx session with performing shoulder extension for peg retrieval with vision occluded for placement in peg board placed on elevated wedge, followed by removal of pegs utilizing lumbrical grasp to tennis ball with small opening to increase LUE proprioception with and without vision occluded, improved gross motor and fine motor control and coordination, improved hand to target precision, and increased L intrinsic/distal strength/endurance.     Pt transitioned to performing various hand-eye coordination activities with tennis ball and basketball to improved hand-eye coordination with LUE, improved hand to target precision with less dysmetria, improved LUE gross motor and fine motor coordination, and improved reaction time of LUE.  Pt completed the following:     Tennis ball bounce/catch to floor alternating between pronation and supination x 20 reps  Tennis ball bounce/catch alternating between R hand and L hand x 20 reps  Tennis ball bounce/catch with forearm maintained in pronated position-- unable to complete  Basketball bounce/catch with BUE to challenge bilateral coordination x 20 reps and x 20 reps with use of mirror as visual feedback  Basketball dribble alternating between RUE and LUE  x 20 reps    Pt concluded tx session with performing target taps to medium sized circles on BITS screen with added clockwise and counter clockwise movements and added central fixation distraction to challenge LUE hand to target precision,  LUE gross motor control/coordination, and reaction time    Trial #1: 78.26% accuracy; 51 seconds time to " complete; 0.94 second reaction time  Trial #2: 79.41% accuracy; 59 seconds time to complete; 1.09 second reaction time  Trial #3: 75% accuracy; 52 seconds time to complete; 0.96 second reaction time  Trial #4: 75% accuracy; 56 second time to complete; 1.03 second reaction time    Assessment: Tolerated treatment well. Patient would benefit from continued OT    Pt demo with improved LUE proprioception with vision occluded and kinesthetic awareness for peg retrieval and placement with shoulder FF and extension movement patterns with abilities to retrieve and place 100% of pegs without droppage.  Pt also demo with significant less frequency of ataxia throughout without dysmetria evident.  Pt also demo with G abilities sustaining lumbrical grasp to tennis ball for removal of all pegs with 2 droppage throughout.  Pt without complaints of distal or proximal LUE fatigue. Pt demo with significant improved accuracy and control of tennis ball bounces with abilities to advance to catching x 10 with forearm maintaining in pronated position-- Pt unable to complete that same activity one week ago.  Pt also demo with significant improved abilities dribbling basketball alternating between RUE and LUE with significant improved coordination/control evident-- Pt only loss control of ball on 2 occasions.  Pt demo with inconsistent accuracy with hand to target demands on BITS screen, in addition to varied reaction time each trial.     Plan: Continue per plan of care.          INTERVENTION COMMENTS:  Diagnosis: Cerebrovascular accident (CVA), unspecified mechanism (HCC) [I63.9]  Precautions: fall risk  Insurance: Payor: [employer]LIN ANDRE REP / Plan: [employer] MEDICARE ADVANTRA / Product Type: Medicare HMO /   6 of 10 visits, PN due 7/27/2024

## 2024-07-18 NOTE — PROGRESS NOTES
PHYSICAL THERAPY TREATMENT     Date: 24  Name: Sanam Castro  : 1955  Referring Provider: Dia Redman MD  AUTHORIZATION:   Insurance: Payor: [employer]  REP / Plan: [employer] / Product Type: Medicare HMO /     SUBJECTIVE:  HPI: Sanam Castro is a 68 y.o. female referred to outpatient physical therapy for the following diagnosis   Encounter Diagnosis   Name Primary?    Cerebrovascular accident (CVA), unspecified mechanism (HCC) Yes     24    Misplaced her license this week. Forgot to bring walker to her son's house last night and walked without it just fine. Doesn't use it much, only in long hallways in her building. Has not discussed returning to driving with OT yet.      7/15/24  Only uses walker for community distances. Does not use any assistive device at home. Wants to discuss returning to driving with OT.    7/10   Reports that she is doing well. Has been using no device while in the home. Brings in MRI report - has new referral for addition of shoulder pain.     24  Reports continuing to work on walking outside of PT. L shoulder continues to bother her. Appointment to orthopedic tomorrow to discuss L shoulder MRI.     24  Reports fatigue after last session. No update on L shoulder MRI yet.    24  Reports continuing to work on walking outside of physical therapy. Got MRI for L shoulder this week and returns to ortho next week.    24  Reports feeling about the same after last visit including continued difficulty going to sleep since stroke. Reports going to more doctors appointments since last visit and needing new glasses for both reading and distance. Reports ongoing L shoulder pain post-CVA especially with overhead movements. Upcoming MRI for L shoulder ordered by PCP on  and  follow up visit with ortho. Follow up visit with vascular surgeon scheduled for Aug 22.  Doing better with mobility, reports has more energy so is  "organizing things at home. Will go to [place] in August.      Patient-Specific Functional Scale   Task is scored 0 (unable to perform activity) to 10 (ability to perform activity independently)  Activity     Get back to being active.     2.   Not being reliant on the walker.     3.   Return to driving.         OBJECTIVE:    Precautions - fall risk    7/18/24  Continuing to work on increasing gait speed, dynamic and reactive balance, walking on uneven surfaces, obstacle negotiation, and walking with narrow base of support.    7/08/24  Continuing to work on increasing gait speed, improving dynamic and reactive balance, and walking on uneven surfaces with obstacles to be able to safely and confidently go to the beach with her family and reduce risk of falls. Held L shoulder exercises today.     7/03/24  Continue to work on increasing gait speed, dynamic and reactive balance, and walking on uneven surfaces to meet patient goals of going to the beach. Held exercises involving L shoulder today.    7/02/24  Continue to challenge balance and work on gait speed. Therapeutic activities with uneven surfaces to mimic sand at beach.    6/27/24  Patient reports pain and difficulty with L shoulder overhead movements including end range active flexion and abduction. Reports similar pain with reaching and grabbing objects overhead in cabinets at home. Does not demonstrate painful arc until closer to end range. Reports pain with active shoulder elevation, with the most pain at end range. Demonstrated b/l shoulder abduction weakness 4/5 MMT.  Some tenderness to palpation around AC joint area. Reports pain and feeling \"separation\" at joint during L shoulder resisted ER. Difficulty with maintaining L arm at side during scapular retraction due to pain.     Specialty Treatment Diary  7/18/24 7/15/24 7/10/24 7/08/24 7/03/24 7/02/24 6/27/24 6/25/24 6/20/24 6/18/24 6/12/24   Home exercise program              (Ther-ex, neuromuscular " "re-ed)Walking/endurance              (Neuromuscular re-ed) Static and dynamic balance/anticipatory / reactive Overground walking in [facility], 50' laps   x1  11 sec.    Overground walking in [employer] 100' laps  x1  21.5 sec.    Resisted walking #20 in [employer], 100' laps  x1  24 sec.    *decreased to #10 based on reported LBP*  Resisted walking #10 in [employer], 100' laps  x4  22.9 sec.  22.5 sec.  21.5 sec.  21.9 sec.     [facility]  (100' x 2 laps)  x1  46.3 sec.    Rest    [facility] pods [facility], 6 targets with mat with foam pads randomly underneath  1 min. x 1 set  RPE 12    Same as above,  - Added 3 circles as targets to walk around for challenge  1 min. x 2 sets  RPE 15 after 1st round w/ targets  RPE 17 after 2nd round w/ targets    Rest    Blaze pods, 6 targets  With foam incline, 1 air ex foam pad, 1 6\" marc, 1 small 6\" foam balance beam pad  1 min. x 3 sets  RPE 17 after 2nd and 3rd round  Overground walking, 75' laps  x3    Overground walking 100' laps with peripheral disc grabs  x4 laps     Resisted overground walking #20  75 ft.  x4  Fastest time: 27.2 sec.     Blaze pods 6 targets in [employer] with foam ramp, 2 12\" obstacles, 2 foam air ex pads  1 min. 15 sec. x 3 sets    Blaze pods with foam mat with foam pads randomly underneath  1 min. 15 sec. x 2 sets    Blaze pods (6 targets) with   4 12\" obstacles   2 air ex foam pads   1 min. 15 sec. x 2 sets Fwd/bwd 20' ea 5x laps SOLO     SOLO hurdles 5x fwd and lat 12\"     Foam beams tandem and lateral 4x laps ea     Obstacle course foam beams with two 12\" hurdles and 6\" curb step with blazepods   Fwd and lat ea with addition of A-Z dual task     Sit <> stands counting down from 100 by 7s with tidal sphere     Soccer ball pass fwd/bwd x5 mins     Tennis volley x5 mins  Overground walking, 100' laps  x3  21.19 sec.  23.37 sec.  23.15 sec.    Overground walking, 50' laps   x2  11.12 sec.  9.34 sec.    Resisted walking, 50' laps " "with #15  x3  27 sec.  24.6 sec.  22.8 sec.     Blaze pods 6 targets in [facility] with foam ramp and foam mat with foam pads randomly underneath, 6\" marc, and 12\" marc  1 min x 4 sets  10 targets  10 targets  8 targets  13 targets    Batting 8\" ball with tennis racquet, multidirection stepping  5 min.   Fast walking, 75' laps  x3    Resisted walking #15 lbs, 100' lap  x2  24.8 sec.  24.0 sec.     Blaze pods 6 targets in [facility] with foam ramp and foam mat with foam pads randomly underneath  1 min x 2 sets    Batting 8\" ball with tennis racquet, multidirection stepping  5 min.    Passing soccer ball back and forth, multidirection stepping about 5 min. Fast walking, half laps 50 feet  X 4    Resisted walking 10 lbs., half 50 feetlaps  X4    Blaze pods 6 targets in [facility] with foam ramp and foam mat with foam pads randomly underneath  1 min x 3 sets    Batting 8\" ball with tennis racquet, multidirection stepping  5 min.    Passing soccer ball back and forth, multidirection stepping about 5 min. Resisted walking 10 lbs.  100' lap  27.3 sec.  24 sec.  21.96    Blaze pods 6 targets in [facility] with foam ramp and foam pad  1 min x 3 sets     Blaze pods 6 targets in [facility] with foam ramp and foam mat with foam pads randomly underneath  1 min x 3 sets  9 targets  9 targets    Banded Shoulder ER \"no moneys\" with yellow theraband, about 10 reps., limited due to L SH pain    Scapular rows with red theraband   about 12 reps., limited due to L SH pain  /82    Testing above/below    Resisted walking pulling 10 lbs 150 feet x 3 set  Fast walking 150 feet x 4 sets  Best 34.1 sec (down and back)    Blaze pods 6 targets  In [facility]  Foam incline  12\" obstacle  4\" step   1 min x 3 sets    Blaze pods 6 targets  In [facility]  2\" foam mat with 2 x 8\" foam underneath randomly  Foam incline  1 min x 3 sets    Batting 8\" ball with tennis racquet, multidirection stepping  4 min    Passing soccer ball back and forth, " "multidirection stepping about 2.5 min /84  67 bpm 99% SpO2    Testing above/below    step up foam incline  Tap blaze pods  6 targets  60 sec x 3 sets    Blaze pods   8\" step   Foam incline  On and over airex foam  1 min x 3 sets    Blaze pods   6 targets   Lateral steps over 6-12\" objects  1 min x 3 sets     FGA  20/30    Fast walking 75 feet down and back, timed  36 sec  31 sec  29.4 sec  28.6 sec    Walking collecting cones from behind, requiring turning head and looking back while walking  About 150 feet x 3  Walking and catching bounced ball, 150 feet x 3    Step up foam incline  Tap blaze pods  6 targets  45 sec x 3 sets  1 set carry 9 lb water weight    Passing soccer ball walking back and forth 3 min    Blaze pods in 6-12\" obstacle course  1 min x 4 sets       Outcomes below     Blaze pod reactive balance  6 targets  In [facility]  12' radius on floor  1 min x 2  Then + carry 9 lb water weight  1 min x 3    Then similar foam incline  1 min x 3 sets    Fast walking 75 feet down and back, timed  42 sec  39 sec  37 sec  40 sec   43 sec    Walk and catch ball all planes, Then similar with change of direction forwards to back, and turning 180 degrees with ball catch 2.5 min     (Ther-ex) Strengthening              (There-ex) Stretching                                   Mobility Measures 6/25/24 6/20/24 6/12/24 5/22/24   5 Time Sit to Stand  (17\" chair, arms across chest) 16.3 seconds 20.1 seconds 18.2 seconds 14.7 seconds   3 Meter Timed  Up & Go   7.4 seconds 8.9 seconds   Walking speed (self-selected)       Functional Gait Assessment (see below) 22/30 18/30 19/30 12/30   6 Minute Walk Test (100 foot circular course) Completed without assistive device with close supervision only  1160 feet no falls no foot scuffing Completed without assistive device in [facility]    1116 feet no falls or loss of balance, mild foot scuffing 5 minutes into walking  87 bpm 97% SpO2      1300 feet with rolling walker    Ending " "heart rate 90s bpm   Patient-Reported Outcome Measure: Activities-Specific Balance Confidence Scale (ABC Scale)  89%  49%     Functional Gait Assessment  3/3 Gait level surface  3/3 Change in gait speed  2/3 Gait with horizontal head turns  1/3 Gait with vertical head turns  3/3 Gait and pivot turn  3/3 Step over obstacle  2/3 Gait with narrow base of support  3/3 Gait with eyes closed  1/3 Ambulating backwards  1/3 Steps  22/30 Total score (less than 22/30 indicates increased risk of fall)..    ASSESSMENT:  Jaimie is a 68 year old female with mobility limitations following CVA 5/04/24.      7/18/24  Continuing to progress with increasing gait speed, endurance, dynamic and reactive balance, disc grabs on periphery of vision, and walking on uneven surfaces. Some difficulty grabbing discs with L hand. Difficulty walking on uneven surfaces, especially with targets to avoid with blaze pods and switching from level surfaces to uneven surfaces. Will add 12\" obstacles to blaze pod course next time. Will continue working on walking with narrow base of support on uneven surfaces with obstacles. Will add treadmill and incorporate dual tasks next session.     7/15/24  Continued difficulty navigating obstacles with uneven surfaces. Modified obstacle course to allow for a space between 12\" obstacles and air ex foam pads. Will continue to work on dynamic and reactive balance, walking with narrow base of support. Will do treadmill and incorporate dual tasks next session, per previous session notes.     7/10/24   Had a high degree of difficulty with performance of foam beams. Did much better hurdles today per patient, able to traverse the 12\"  hurdles without much loss of balance. Able to use a mix of hip and stepping strategy to correct his balance. Used SOLO throughout session for fall prevention. Had some difficulty with dual tasking, would benefit from this being incorporated throughout treatment plans. Would probably try " "treadmill again as patient has made some great progress and would tolerate this better. Appropriate and challenging RPE throughout session.     7/08/24  Continuing to progress with dynamic and reactive balance, walking on inclines and uneven surfaces, and walking over obstacles. Demonstrates improvement in correcting episodes of LOB while navigating uneven surfaces and dynamic and reactive balance including blaze pod targets and hitting a small 8\"ball with a tennis racquet back and forth at faster speeds. Will continue to work on balance and walking on uneven surfaces to work towards patient goals of going to the beach with her family and returning to previous level of function.       7/03/24  Similar to yesterday. Continues to progress with reactive balance and cardiovascular endurance. Worked on cardiovascular endurance activities for longer periods of time compared to previous sessions including tennis and soccer. Demonstrating improvement in L sided functional mobility during activities such as hitting backhands (one-handed, using R hand) in tennis and kicking a soccer ball with L foot but needs continued improvement with coordinating movements. Continue to challenge reactive and dynamic balance, cardiovascular endurance, and gait speed for safe ambulation. Will continue to encourage use of L sided functional activities to return to previous level of function including playing tennis and pickleball.       7/02/24  Jaimie continues to progress with reactive balance and exercise tolerance. Tolerated longer durations of tennis and soccer today. Kicked soccer ball with L foot with more accuracy more consistently today compared to previous. Will continue challenging balance and working on uneven surfaces with directional changes to work towards goals of being able to walk on the beach with family in August.      6/27/24  Jaimie is a 68 year old female with mobility limitations following CVA 5/04/24.      Jaimie shows " "improvement in reactive balance and gait. Able to complete activities with uneven surfaces with less supervision. Assessed L shoulder due to upcoming MRI. Reports L shoulder pain post-CVA with UE movement including overhead movements, full range flexion, full range ABD and ther ex including banded Shoulder ER \"no moneys\" with yellow theraband and scapular rows with red theraband. Demonstrated difficulty with keeping L elbow at side due to L shoulder pain during rows and difficulty ER L shoulder with banded exercise. 4/5 L SH ABD strength. Will continue to evaluate next visit. Continues to require physical therapy to return to previous level of function.      SHORT-TERM GOALS: by 6/05/24, continued to 7/08/24  1. Patient scores at least 16/30 on FGA.  MET.  2. Patient walks at least 1500 feet during 6 minute walk test. NOT MET.  3. Patient safely walks within the home with a cane.  MET.    LONG-TERM GOALS: by 7/22/24   1. Patient returns to prior level of community walking.  2. Patient scores at least 23/30 on FGA for safe mobility without assistive device.  3. Patient able to safely participate in walking on the beach during her family vacation in early August.     PLAN OF CARE:  Patient will benefit from physical therapy 2 times per week for 1 month  Neuromuscular re-education, therapeutic exercises, and therapeutic activities as outlined in grids.    Treatment performed by FRANCESCO Franklin, under direct supervision of Gerald Galvez PT.  "

## 2024-07-23 ENCOUNTER — OFFICE VISIT (OUTPATIENT)
Dept: OCCUPATIONAL THERAPY | Facility: REHABILITATION | Age: 69
End: 2024-07-23
Payer: COMMERCIAL

## 2024-07-23 ENCOUNTER — OFFICE VISIT (OUTPATIENT)
Facility: REHABILITATION | Age: 69
End: 2024-07-23
Payer: COMMERCIAL

## 2024-07-23 DIAGNOSIS — I63.9 CEREBROVASCULAR ACCIDENT (CVA), UNSPECIFIED MECHANISM (HCC): Primary | ICD-10-CM

## 2024-07-23 PROCEDURE — 97530 THERAPEUTIC ACTIVITIES: CPT | Performed by: PHYSICAL THERAPIST

## 2024-07-23 PROCEDURE — 97530 THERAPEUTIC ACTIVITIES: CPT | Performed by: OCCUPATIONAL THERAPIST

## 2024-07-23 PROCEDURE — 97535 SELF CARE MNGMENT TRAINING: CPT | Performed by: OCCUPATIONAL THERAPIST

## 2024-07-23 PROCEDURE — 97112 NEUROMUSCULAR REEDUCATION: CPT | Performed by: PHYSICAL THERAPIST

## 2024-07-23 NOTE — TELEPHONE ENCOUNTER
07/23/24 11:51 AM      Please remind staff to not remove PCP at office level for this scenario; [facility] Department will remove.    The office's has been received, reviewed, and the patient chart updated. The PCP has successfully been removed with a patient attribution note. This message will now be completed.        Thank you  Philip Arroyo

## 2024-07-23 NOTE — PROGRESS NOTES
PHYSICAL THERAPY TREATMENT     Date: 24  Name: Sanam Castro  : 1955  Referring Provider: Dia Redman MD  AUTHORIZATION:   Insurance: Payor: ORLIN  REP / Plan: [facility] MEDICARE ADVANTRA / Product Type: Medicare HMO /     SUBJECTIVE:  HPI: Sanam Castro is a 68 y.o. female referred to outpatient physical therapy for the following diagnosis   Encounter Diagnosis   Name Primary?    Cerebrovascular accident (CVA), unspecified mechanism (HCC) Yes     24  Found her license. Usually goes to [place] from  to end of August but only going two weeks this year due to her stroke. Did not bring her walker today. Staying active outside of physical therapy.     24    Misplaced her license this week. Forgot to bring walker to her son's house last night and walked without it just fine. Doesn't use it much, only in long hallways in her building. Has not discussed returning to driving with OT yet.      7/15/24  Only uses walker for community distances. Does not use any assistive device at home. Wants to discuss returning to driving with OT.    7/10   Reports that she is doing well. Has been using no device while in the home. Brings in MRI report - has new referral for addition of shoulder pain.     24  Reports continuing to work on walking outside of PT. L shoulder continues to bother her. Appointment to orthopedic tomorrow to discuss L shoulder MRI.     24  Reports fatigue after last session. No update on L shoulder MRI yet.    24  Reports continuing to work on walking outside of physical therapy. Got MRI for L shoulder this week and returns to ortho next week.    24  Reports feeling about the same after last visit including continued difficulty going to sleep since stroke. Reports going to more doctors appointments since last visit and needing new glasses for both reading and distance. Reports ongoing L shoulder pain post-CVA especially with overhead movements.  "Upcoming MRI for L shoulder ordered by PCP on Monday July 1 and July 9 follow up visit with ortho. Follow up visit with vascular surgeon scheduled for Aug 22.  Doing better with mobility, reports has more energy so is organizing things at home. Will go to [place] in August.      Patient-Specific Functional Scale   Task is scored 0 (unable to perform activity) to 10 (ability to perform activity independently)  Activity     Get back to being active.     2.   Not being reliant on the walker.     3.   Return to driving.         OBJECTIVE:    Precautions - fall risk    7/23/24 7/18/24  Continuing to work on increasing gait speed, dynamic and reactive balance, walking on uneven surfaces, obstacle negotiation, and walking with narrow base of support.    7/08/24  Continuing to work on increasing gait speed, improving dynamic and reactive balance, and walking on uneven surfaces with obstacles to be able to safely and confidently go to the beach with her family and reduce risk of falls. Held L shoulder exercises today.     7/03/24  Continue to work on increasing gait speed, dynamic and reactive balance, and walking on uneven surfaces to meet patient goals of going to the beach. Held exercises involving L shoulder today.    7/02/24  Continue to challenge balance and work on gait speed. Therapeutic activities with uneven surfaces to mimic sand at beach.    6/27/24  Patient reports pain and difficulty with L shoulder overhead movements including end range active flexion and abduction. Reports similar pain with reaching and grabbing objects overhead in cabinets at home. Does not demonstrate painful arc until closer to end range. Reports pain with active shoulder elevation, with the most pain at end range. Demonstrated b/l shoulder abduction weakness 4/5 MMT.  Some tenderness to palpation around AC joint area. Reports pain and feeling \"separation\" at joint during L shoulder resisted ER. Difficulty with maintaining L " "arm at side during scapular retraction due to pain.     Specialty Treatment Diary  7/23/24 7/18/24 7/15/24 7/10/24 7/08/24 7/03/24 7/02/24 6/27/24 6/25/24 6/20/24 6/18/24 6/12/24   Home exercise program               (Ther-ex, neuromuscular re-ed)Walking/endurance               (Neuromuscular re-ed) Static and dynamic balance/anticipatory / reactive Overground walking,       Resisted walking #10 in SoloStep      Blaze pods in SoloStep, 6 targets with mat with foam pads randomly underneath, with 3 circles as targets to walk around   1 min. x 3   8 targets  10 targets  14 targets  RPE 15 when unable to step off the mat      Blaze pods, 6 targets  With foam incline, 2 air ex foam pads, 6\" marc  1 min. x 3 sets  6 targets  11 targets  RPE 13       Blaze pods with obstacles mixed obstacles, 2 6\" & 3 12\"   6 targets  1 min. X 3  8 targets  9 targets  RPE 15     Same as above, must go laterally  1 min. X 1  7 targets     Tennis volley with 8\" ball x 5 min.     Overground walking in SoloSte, 50' laps   x1  11 sec.    Overground walking in SoloStep 100' laps  x1  21.5 sec.    Resisted walking #20 in SoloStep, 100' laps  x1  24 sec.    *decreased to #10 based on reported LBP*  Resisted walking #10 in SoloStep, 100' laps  x4  22.9 sec.  22.5 sec.  21.5 sec.  21.9 sec.     Resisted walking #10 in SoloStep, 200' lap  (100' x 2 laps)  x1  46.3 sec.    Rest    Blaze pods in SoloStep, 6 targets with mat with foam pads randomly underneath  1 min. x 1 set  RPE 12    Same as above,  - Added 3 circles as targets to walk around for challenge  1 min. x 2 sets  RPE 15 after 1st round w/ targets  RPE 17 after 2nd round w/ targets    Rest    Blaze pods, 6 targets  With foam incline, 1 air ex foam pad, 1 6\" marc, 1 small 6\" foam balance beam pad  1 min. x 3 sets  RPE 17 after 2nd and 3rd round  Overground walking, 75' laps  x3    Overground walking 100' laps with peripheral disc grabs  x4 laps     Resisted overground walking #20  75 " "ft.  x4  Fastest time: 27.2 sec.     Blaze pods 6 targets in [facility] with foam ramp, 2 12\" obstacles, 2 foam air ex pads  1 min. 15 sec. x 3 sets    Blaze pods with foam mat with foam pads randomly underneath  1 min. 15 sec. x 2 sets    Blaze pods (6 targets) with   4 12\" obstacles   2 air ex foam pads   1 min. 15 sec. x 2 sets Fwd/bwd 20' ea 5x laps SOLO     SOLO hurdles 5x fwd and lat 12\"     Foam beams tandem and lateral 4x laps ea     Obstacle course foam beams with two 12\" hurdles and 6\" curb step with blazepods   Fwd and lat ea with addition of A-Z dual task     Sit <> stands counting down from 100 by 7s with tidal sphere     Soccer ball pass fwd/bwd x5 mins     Tennis volley x5 mins  Overground walking, 100' laps  x3  21.19 sec.  23.37 sec.  23.15 sec.    Overground walking, 50' laps   x2  11.12 sec.  9.34 sec.    Resisted walking, 50' laps with #15  x3  27 sec.  24.6 sec.  22.8 sec.     Blaze pods 6 targets in [facility] with foam ramp and foam mat with foam pads randomly underneath, 6\" marc, and 12\" marc  1 min x 4 sets  10 targets  10 targets  8 targets  13 targets    Batting 8\" ball with tennis racquet, multidirection stepping  5 min.   Fast walking, 75' laps  x3    Resisted walking #15 lbs, 100' lap  x2  24.8 sec.  24.0 sec.     Blaze pods 6 targets in [facility] with foam ramp and foam mat with foam pads randomly underneath  1 min x 2 sets    Batting 8\" ball with tennis racquet, multidirection stepping  5 min.    Passing soccer ball back and forth, multidirection stepping about 5 min. Fast walking, half laps 50 feet  X 4    Resisted walking 10 lbs., half 50 feetlaps  X4    Blaze pods 6 targets in [facility] with foam ramp and foam mat with foam pads randomly underneath  1 min x 3 sets    Batting 8\" ball with tennis racquet, multidirection stepping  5 min.    Passing soccer ball back and forth, multidirection stepping about 5 min. Resisted walking 10 lbs.  100' lap  27.3 sec.  24 sec.  21.96    Blaze pods " "6 targets in SoloStep with foam ramp and foam pad  1 min x 3 sets     Blaze pods 6 targets in SoloStep with foam ramp and foam mat with foam pads randomly underneath  1 min x 3 sets  9 targets  9 targets    Banded Shoulder ER \"no moneys\" with yellow theraband, about 10 reps., limited due to L SH pain    Scapular rows with red theraband   about 12 reps., limited due to L SH pain  /82    Testing above/below    Resisted walking pulling 10 lbs 150 feet x 3 set  Fast walking 150 feet x 4 sets  Best 34.1 sec (down and back)    Blaze pods 6 targets  In [facility]  Foam incline  12\" obstacle  4\" step   1 min x 3 sets    Blaze pods 6 targets  In [facility]  2\" foam mat with 2 x 8\" foam underneath randomly  Foam incline  1 min x 3 sets    Batting 8\" ball with tennis racquet, multidirection stepping  4 min    Passing soccer ball back and forth, multidirection stepping about 2.5 min /84  67 bpm 99% SpO2    Testing above/below    step up foam incline  Tap blaze pods  6 targets  60 sec x 3 sets    Blaze pods   8\" step   Foam incline  On and over airex foam  1 min x 3 sets    Blaze pods   6 targets   Lateral steps over 6-12\" objects  1 min x 3 sets     FGA  20/30    Fast walking 75 feet down and back, timed  36 sec  31 sec  29.4 sec  28.6 sec    Walking collecting cones from behind, requiring turning head and looking back while walking  About 150 feet x 3  Walking and catching bounced ball, 150 feet x 3    Step up foam incline  Tap blaze pods  6 targets  45 sec x 3 sets  1 set carry 9 lb water weight    Passing soccer ball walking back and forth 3 min    Blaze pods in 6-12\" obstacle course  1 min x 4 sets       Outcomes below     Blaze pod reactive balance  6 targets  In [facility]  12' radius on floor  1 min x 2  Then + carry 9 lb water weight  1 min x 3    Then similar foam incline  1 min x 3 sets    Fast walking 75 feet down and back, timed  42 sec  39 sec  37 sec  40 sec   43 sec    Walk and catch ball all planes, Then " "similar with change of direction forwards to back, and turning 180 degrees with ball catch 2.5 min     (Ther-ex) Strengthening               (There-ex) Stretching                                     Mobility Measures 6/25/24 6/20/24 6/12/24 5/22/24   5 Time Sit to Stand  (17\" chair, arms across chest) 16.3 seconds 20.1 seconds 18.2 seconds 14.7 seconds   3 Meter Timed  Up & Go   7.4 seconds 8.9 seconds   Walking speed (self-selected)       Functional Gait Assessment (see below) 22/30 18/30 19/30 12/30   6 Minute Walk Test (100 foot circular course) Completed without assistive device with close supervision only  1160 feet no falls no foot scuffing Completed without assistive device in solostep    1116 feet no falls or loss of balance, mild foot scuffing 5 minutes into walking  87 bpm 97% SpO2      1300 feet with rolling walker    Ending heart rate 90s bpm   Patient-Reported Outcome Measure: Activities-Specific Balance Confidence Scale (ABC Scale)  89%  49%     Functional Gait Assessment  3/3 Gait level surface  3/3 Change in gait speed  2/3 Gait with horizontal head turns  1/3 Gait with vertical head turns  3/3 Gait and pivot turn  3/3 Step over obstacle  2/3 Gait with narrow base of support  3/3 Gait with eyes closed  1/3 Ambulating backwards  1/3 Steps  22/30 Total score (less than 22/30 indicates increased risk of fall)..    ASSESSMENT:  Jaimie is a 68 year old female with mobility limitations following CVA 5/04/24.      7/23/24  Improvement in navigating uneven surfaces, negotiating obstacles, and directional changes with movement with less LOB, especially in posterior direction. Walking around obstacles/ targets on uneven targets was the most challenging task for her today. Continued improvement with reactive balance including blaze pod activities and ability to maintain tennis volley while moving in all directions and switching between james and backhand today, making progress to return to playing tennis. " "Continued difficulty moving from one uneven surface to another and navigating obstacles on uneven surfaces. Increased width of the space between air ex foam pad and incline mat to allow for a step between the two during blaze pod course today due to patient reported difficulty (RPE 17 before pad was moved further apart from foam incline). Will continue working on gait speed and walking with directional changes, uneven surfaces, and obstacles. Cleared all obstacles including 12\". Add treadmill and tasks involving narrow base of support next session. Will add stair training in future sessions to improve functional mobility and achieve higher FGA score in stair negotiation.    7/18/24  Continuing to progress with increasing gait speed, endurance, dynamic and reactive balance, disc grabs on periphery of vision, and walking on uneven surfaces. Some difficulty grabbing discs with L hand. Difficulty walking on uneven surfaces, especially with targets to avoid with blaze pods and switching from level surfaces to uneven surfaces. Will add 12\" obstacles to blaze pod course next time. Will continue working on walking with narrow base of support on uneven surfaces with obstacles. Will add treadmill and incorporate dual tasks next session.     7/15/24  Continued difficulty navigating obstacles with uneven surfaces. Modified obstacle course to allow for a space between 12\" obstacles and air ex foam pads. Will continue to work on dynamic and reactive balance, walking with narrow base of support. Will do treadmill and incorporate dual tasks next session, per previous session notes.     7/10/24   Had a high degree of difficulty with performance of foam beams. Did much better hurdles today per patient, able to traverse the 12\"  hurdles without much loss of balance. Able to use a mix of hip and stepping strategy to correct his balance. Used SOLO throughout session for fall prevention. Had some difficulty with dual tasking, would " "benefit from this being incorporated throughout treatment plans. Would probably try treadmill again as patient has made some great progress and would tolerate this better. Appropriate and challenging RPE throughout session.     7/08/24  Continuing to progress with dynamic and reactive balance, walking on inclines and uneven surfaces, and walking over obstacles. Demonstrates improvement in correcting episodes of LOB while navigating uneven surfaces and dynamic and reactive balance including blaze pod targets and hitting a small 8\"ball with a tennis racquet back and forth at faster speeds. Will continue to work on balance and walking on uneven surfaces to work towards patient goals of going to the beach with her family and returning to previous level of function.       7/03/24  Similar to yesterday. Continues to progress with reactive balance and cardiovascular endurance. Worked on cardiovascular endurance activities for longer periods of time compared to previous sessions including tennis and soccer. Demonstrating improvement in L sided functional mobility during activities such as hitting backhands (one-handed, using R hand) in tennis and kicking a soccer ball with L foot but needs continued improvement with coordinating movements. Continue to challenge reactive and dynamic balance, cardiovascular endurance, and gait speed for safe ambulation. Will continue to encourage use of L sided functional activities to return to previous level of function including playing tennis and pickleball.       7/02/24  Jaimie continues to progress with reactive balance and exercise tolerance. Tolerated longer durations of tennis and soccer today. Kicked soccer ball with L foot with more accuracy more consistently today compared to previous. Will continue challenging balance and working on uneven surfaces with directional changes to work towards goals of being able to walk on the beach with family in August.      6/27/24  Jaimie is a 68 " "year old female with mobility limitations following CVA 5/04/24.      Jaimie shows improvement in reactive balance and gait. Able to complete activities with uneven surfaces with less supervision. Assessed L shoulder due to upcoming MRI. Reports L shoulder pain post-CVA with UE movement including overhead movements, full range flexion, full range ABD and ther ex including banded Shoulder ER \"no moneys\" with yellow theraband and scapular rows with red theraband. Demonstrated difficulty with keeping L elbow at side due to L shoulder pain during rows and difficulty ER L shoulder with banded exercise. 4/5 L SH ABD strength. Will continue to evaluate next visit. Continues to require physical therapy to return to previous level of function.      SHORT-TERM GOALS: by 6/05/24, continued to 7/08/24  1. Patient scores at least 16/30 on FGA.  MET.  2. Patient walks at least 1500 feet during 6 minute walk test. NOT MET.  3. Patient safely walks within the home with a cane.  MET.    LONG-TERM GOALS: by 7/22/24   1. Patient returns to prior level of community walking.  2. Patient scores at least 23/30 on FGA for safe mobility without assistive device.  3. Patient able to safely participate in walking on the beach during her family vacation in early August.     PLAN OF CARE:  Patient will benefit from physical therapy 2 times per week for 1 month  Neuromuscular re-education, therapeutic exercises, and therapeutic activities as outlined in grids.    Treatment performed by FRANCESCO Franklin, under direct supervision of Gerald Galvez, PT.    7/23/24  "

## 2024-07-23 NOTE — PROGRESS NOTES
"Daily Note     Today's date: 2024  Patient name: Sanam Castro  : 1955  MRN: [id number]  Referring provider: Dia Redman MD  Dx:   Encounter Diagnosis     ICD-10-CM    1. Cerebrovascular accident (CVA), unspecified mechanism (HCC)  I63.9                      Subjective: \"I am having a hard time folding my shirts and towels sometimes.\"      Objective: See treatment description below    Self-care Management: Pt began tx session with performing laundry task of folding both shirts and towels to improve bilateral coordination, gross motor control/coordination of LUE, and improved hand to target precision/accuracy.    Thera Act: Pt advanced to performing mixed motor activity beginning with manipulating ring unilaterally in L hand only, followed by placement of ring on metal meaghan in PND D1 flexion/extension movement patterns focusing on improved speed and accuracy of in-hand manipulation, increased gross motor coordination, improved hand to target precision, and improved LUE proprioception and kinesthetic awareness.    Assessment: Tolerated treatment well. Patient would benefit from continued OT    Pt demo with excellent bilateral coordination and integration when folding both t-shirts and towels without overcompensation of RUE and with excellent gross motor control and coordination evident throughout. Pt also completed at appropriate pace without motor delays presented.  Pt presented with excellent speed and accuracy with in-hand manipulation demands without distal motor delays and with droppage evident throughout.  Pt also demo with excellent gross motor control/coordination and hand to target precision in PNF D1 planes without abilities to place 100% of rings on metal meaghan without dysmetria presented.     Plan: Continue per plan of care.          INTERVENTION COMMENTS:  Diagnosis: Cerebrovascular accident (CVA), unspecified mechanism (HCC) [I63.9]  Precautions: fall risk  Insurance: Payor: ORLIN ANDRE REP " / Plan: [employer] MEDICARE ADVANTRA / Product Type: Medicare HMO /   7 of 10 visits, PN due 7/27/2024

## 2024-07-25 ENCOUNTER — OFFICE VISIT (OUTPATIENT)
Dept: OCCUPATIONAL THERAPY | Facility: REHABILITATION | Age: 69
End: 2024-07-25
Payer: COMMERCIAL

## 2024-07-25 ENCOUNTER — OFFICE VISIT (OUTPATIENT)
Facility: REHABILITATION | Age: 69
End: 2024-07-25
Payer: COMMERCIAL

## 2024-07-25 DIAGNOSIS — I63.9 CEREBROVASCULAR ACCIDENT (CVA), UNSPECIFIED MECHANISM (HCC): Primary | ICD-10-CM

## 2024-07-25 PROCEDURE — 97530 THERAPEUTIC ACTIVITIES: CPT | Performed by: PHYSICAL THERAPIST

## 2024-07-25 PROCEDURE — 97530 THERAPEUTIC ACTIVITIES: CPT | Performed by: OCCUPATIONAL THERAPIST

## 2024-07-25 NOTE — PROGRESS NOTES
"Daily Note     Today's date: 2024  Patient name: Sanam Castro  : 1955  MRN: [id number]  Referring provider: Dia Redman MD  Dx:   Encounter Diagnosis     ICD-10-CM    1. Cerebrovascular accident (CVA), unspecified mechanism (HCC)  I63.9                      Subjective: \"I feel a little tired.  I feel I laid around too much yesterday.\"      Objective: See treatment description below      Thera Act: In stance functional reach with midline crossing for card retrieval and placement on card board placed at OH level with #2 wrist weight donned and with added in-hand manipulation demand of rotating card unilaterally in L hand x 1 full rotation prior to placement focusing on increased speed and accuracy of in-hand manipulation demands, improved hand to target precision, and increased LUE proximal strength/coordination.  Pt advanced to removal of each card for placement back on table top with movement performed in PNF D1 flexion/extension movement patterns to further challenge gross motor control and proprioception/kinesthetic awareness of LUE.     Pt concluded tx session with bilateral integrative task of tying knots in rope beginning at OH level and descending down to floor level with added #2 wrist weight donned to improve bilateral coordination/bimanual dexterity, improved L FMC/prehension, and increased LUE proximal/distal strength/endurance.    Assessment: Tolerated treatment well. Patient would benefit from continued OT    Pt presented with increased frequency of LUE ataxia when reaching outside of midline and to OH level resulting in less gross motor control and hand to target precision.  Pt also presented with mild LUE proximal fatigue with reaching to OH level with high repetition and with #2 wrist weight donned requiring one rest break.  Pt with only one droppage evident throughout full duration of functional task.  Pt demo with significant improved bilateral coordination for tying/untying of " ropes at appropriate time without L distal motor delays presented throughout.  Pt without tendencies of overcompensating with R hand throughout.      Plan: Continue per plan of care.          INTERVENTION COMMENTS:  Diagnosis: Cerebrovascular accident (CVA), unspecified mechanism (HCC) [I63.9]  Precautions: fall risk  Insurance: Payor: ORLIN  REP / Plan: [employer] MEDICARE ADVANTRA / Product Type: Medicare HMO /   8 of 10 visits, PN due 7/27/2024

## 2024-07-25 NOTE — PROGRESS NOTES
PHYSICAL THERAPY TREATMENT / UPDATE    Date: 24  Name: Sanam Castro  : 1955  Referring Provider: Dia Redman MD  AUTHORIZATION:   Insurance: Payor: [employer]  REP / Plan: [employer]ICARE ADVANTRA / Product Type: Medicare HMO /     SUBJECTIVE:  HPI: Sanam Castro is a 68 y.o. female referred to outpatient physical therapy for the following diagnosis   Encounter Diagnosis   Name Primary?    Cerebrovascular accident (CVA), unspecified mechanism (HCC) Yes     24  Biggest concern with traveling would be navigating long distances, tends to get wobbly with tired.  Would sit down.    24  Found her license. Usually goes to [place] from  to end of August but only going two weeks this year due to her stroke. Did not bring her walker today. Staying active outside of physical therapy.     24    Misplaced her license this week. Forgot to bring walker to her son's house last night and walked without it just fine. Doesn't use it much, only in long hallways in her building. Has not discussed returning to driving with OT yet.      7/15/24  Only uses walker for community distances. Does not use any assistive device at home. Wants to discuss returning to driving with OT.    7/10   Reports that she is doing well. Has been using no device while in the home. Brings in MRI report - has new referral for addition of shoulder pain.     24  Reports continuing to work on walking outside of PT. L shoulder continues to bother her. Appointment to orthopedic tomorrow to discuss L shoulder MRI.     24  Reports fatigue after last session. No update on L shoulder MRI yet.    24  Reports continuing to work on walking outside of physical therapy. Got MRI for L shoulder this week and returns to ortho next week.    24  Reports feeling about the same after last visit including continued difficulty going to sleep since stroke. Reports going to more doctors appointments since last visit and  needing new glasses for both reading and distance. Reports ongoing L shoulder pain post-CVA especially with overhead movements. Upcoming MRI for L shoulder ordered by PCP on Monday July 1 and July 9 follow up visit with ortho. Follow up visit with vascular surgeon scheduled for Aug 22.  Doing better with mobility, reports has more energy so is organizing things at home. Will go to [place] in August.      Patient-Specific Functional Scale   Task is scored 0 (unable to perform activity) to 10 (ability to perform activity independently)  Activity     Get back to being active.     2.   Not being reliant on the walker.     3.   Return to driving.         OBJECTIVE:    Precautions - fall risk    7/23/24 7/18/24  Continuing to work on increasing gait speed, dynamic and reactive balance, walking on uneven surfaces, obstacle negotiation, and walking with narrow base of support.    7/08/24  Continuing to work on increasing gait speed, improving dynamic and reactive balance, and walking on uneven surfaces with obstacles to be able to safely and confidently go to the beach with her family and reduce risk of falls. Held L shoulder exercises today.     7/03/24  Continue to work on increasing gait speed, dynamic and reactive balance, and walking on uneven surfaces to meet patient goals of going to the beach. Held exercises involving L shoulder today.    7/02/24  Continue to challenge balance and work on gait speed. Therapeutic activities with uneven surfaces to mimic sand at beach.    6/27/24  Patient reports pain and difficulty with L shoulder overhead movements including end range active flexion and abduction. Reports similar pain with reaching and grabbing objects overhead in cabinets at home. Does not demonstrate painful arc until closer to end range. Reports pain with active shoulder elevation, with the most pain at end range. Demonstrated b/l shoulder abduction weakness 4/5 MMT.  Some tenderness to palpation  "around AC joint area. Reports pain and feeling \"separation\" at joint during L shoulder resisted ER. Difficulty with maintaining L arm at side during scapular retraction due to pain.     Specialty Treatment Diary  7/25/24 7/23/24 7/18/24   Home exercise program      (Ther-ex, neuromuscular re-ed)Walking/endurance      (Neuromuscular re-ed) Static and dynamic balance/anticipatory / reactive Outcome measures    Overground walking as fast as possible 100 feet x 4 sets  Best 19.6 seconds    Overground 2\" foam over 2x8\" foam blocks  16' line in [facility]  1 min x 3 sets   Best 12 7/10 RPS    Similar in [facility]  Tapping 6 targets knee to hip high with 9 lb water weight  1 min x 3 sets   Overground walking,       Resisted walking #10 in [facility]      Blaze pods in [facility], 6 targets with mat with foam pads randomly underneath, with 3 circles as targets to walk around   1 min. x 3   8 targets  10 targets  14 targets  RPE 15 when unable to step off the mat      Blaze pods, 6 targets  With foam incline, 2 air ex foam pads, 6\" marc  1 min. x 3 sets  6 targets  11 targets  RPE 13       Blaze pods with obstacles mixed obstacles, 2 6\" & 3 12\"   6 targets  1 min. X 3  8 targets  9 targets  RPE 15     Same as above, must go laterally  1 min. X 1  7 targets     Tennis volley with 8\" ball x 5 min.     Overground walking in [facility], 50' laps   x1  11 sec.    Overground walking in [facility] 100' laps  x1  21.5 sec.    Resisted walking #20 in [facility], 100' laps  x1  24 sec.    *decreased to #10 based on reported LBP*  Resisted walking #10 in [facility], 100' laps  x4  22.9 sec.  22.5 sec.  21.5 sec.  21.9 sec.     Resisted walking #10 in [facility], 200' lap  (100' x 2 laps)  x1  46.3 sec.    Rest    Blaze pods in [facility], 6 targets with mat with foam pads randomly underneath  1 min. x 1 set  RPE 12    Same as above,  - Added 3 circles as targets to walk around for challenge  1 min. x 2 sets  RPE 15 after 1st round w/ targets  RPE 17 " "after 2nd round w/ targets    Rest    Blaze pods, 6 targets  With foam incline, 1 air ex foam pad, 1 6\" marc, 1 small 6\" foam balance beam pad  1 min. x 3 sets  RPE 17 after 2nd and 3rd round    (Ther-ex) Strengthening      (There-ex) Stretching                   Mobility Measures 7/25/24 6/25/24 6/20/24 6/12/24 5/22/24   5 Time Sit to Stand  (17\" chair, arms across chest) 12.6 seconds 16.3 seconds 20.1 seconds 18.2 seconds 14.7 seconds   3 Meter Timed  Up & Go 6.9 seconds   7.4 seconds 8.9 seconds   Walking speed (self-selected)        Functional Gait Assessment (see below) 27/30 22/30 18/30 19/30 12/30   6 Minute Walk Test (100 foot circular course) 1230 feet  No falls   No assistive device     Completed without assistive device with close supervision only  1160 feet no falls no foot scuffing Completed without assistive device in solostep    1116 feet no falls or loss of balance, mild foot scuffing 5 minutes into walking  87 bpm 97% SpO2      1300 feet with rolling walker    Ending heart rate 90s bpm   Patient-Reported Outcome Measure: Activities-Specific Balance Confidence Scale (ABC Scale)   89%  49%     Functional Gait Assessment  3/3 Gait level surface  3/3 Change in gait speed  2/3 Gait with horizontal head turns  3/3 Gait with vertical head turns  3/3 Gait and pivot turn  3/3 Step over obstacle  2/3 Gait with narrow base of support  3/3 Gait with eyes closed  2/3 Ambulating backwards  3/3 Steps  27/30 Total score (less than 22/30 indicates increased risk of fall)..        ASSESSMENT:  Jaimie is a 68 year old female with mobility limitations following CVA 5/04/24.      7/25/24  Good improvement in mobility measures, especially dynamic balance.  Safe for ambulation without assistive device.  Continue to work on endurance and \"balance endurance\" to maintain good dynamic balance with extended household and community ambuation.        7/23/24  Improvement in navigating uneven surfaces, negotiating obstacles, and " "directional changes with movement with less LOB, especially in posterior direction. Walking around obstacles/ targets on uneven targets was the most challenging task for her today. Continued improvement with reactive balance including blaze pod activities and ability to maintain tennis volley while moving in all directions and switching between james and backhand today, making progress to return to playing tennis. Continued difficulty moving from one uneven surface to another and navigating obstacles on uneven surfaces. Increased width of the space between air ex foam pad and incline mat to allow for a step between the two during blaze pod course today due to patient reported difficulty (RPE 17 before pad was moved further apart from foam incline). Will continue working on gait speed and walking with directional changes, uneven surfaces, and obstacles. Cleared all obstacles including 12\". Add treadmill and tasks involving narrow base of support next session. Will add stair training in future sessions to improve functional mobility and achieve higher FGA score in stair negotiation.    7/18/24  Continuing to progress with increasing gait speed, endurance, dynamic and reactive balance, disc grabs on periphery of vision, and walking on uneven surfaces. Some difficulty grabbing discs with L hand. Difficulty walking on uneven surfaces, especially with targets to avoid with blaze pods and switching from level surfaces to uneven surfaces. Will add 12\" obstacles to blaze pod course next time. Will continue working on walking with narrow base of support on uneven surfaces with obstacles. Will add treadmill and incorporate dual tasks next session.     7/15/24  Continued difficulty navigating obstacles with uneven surfaces. Modified obstacle course to allow for a space between 12\" obstacles and air ex foam pads. Will continue to work on dynamic and reactive balance, walking with narrow base of support. Will do treadmill and " "incorporate dual tasks next session, per previous session notes.     7/10/24   Had a high degree of difficulty with performance of foam beams. Did much better hurdles today per patient, able to traverse the 12\"  hurdles without much loss of balance. Able to use a mix of hip and stepping strategy to correct his balance. Used SOLO throughout session for fall prevention. Had some difficulty with dual tasking, would benefit from this being incorporated throughout treatment plans. Would probably try treadmill again as patient has made some great progress and would tolerate this better. Appropriate and challenging RPE throughout session.     7/08/24  Continuing to progress with dynamic and reactive balance, walking on inclines and uneven surfaces, and walking over obstacles. Demonstrates improvement in correcting episodes of LOB while navigating uneven surfaces and dynamic and reactive balance including blaze pod targets and hitting a small 8\"ball with a tennis racquet back and forth at faster speeds. Will continue to work on balance and walking on uneven surfaces to work towards patient goals of going to the beach with her family and returning to previous level of function.       7/03/24  Similar to yesterday. Continues to progress with reactive balance and cardiovascular endurance. Worked on cardiovascular endurance activities for longer periods of time compared to previous sessions including tennis and soccer. Demonstrating improvement in L sided functional mobility during activities such as hitting backhands (one-handed, using R hand) in tennis and kicking a soccer ball with L foot but needs continued improvement with coordinating movements. Continue to challenge reactive and dynamic balance, cardiovascular endurance, and gait speed for safe ambulation. Will continue to encourage use of L sided functional activities to return to previous level of function including playing tennis and pickleball.       7/02/24  Jaimie " "continues to progress with reactive balance and exercise tolerance. Tolerated longer durations of tennis and soccer today. Kicked soccer ball with L foot with more accuracy more consistently today compared to previous. Will continue challenging balance and working on uneven surfaces with directional changes to work towards goals of being able to walk on the beach with family in August.      6/27/24  Jaimie is a 68 year old female with mobility limitations following CVA 5/04/24.      Jaimie shows improvement in reactive balance and gait. Able to complete activities with uneven surfaces with less supervision. Assessed L shoulder due to upcoming MRI. Reports L shoulder pain post-CVA with UE movement including overhead movements, full range flexion, full range ABD and ther ex including banded Shoulder ER \"no moneys\" with yellow theraband and scapular rows with red theraband. Demonstrated difficulty with keeping L elbow at side due to L shoulder pain during rows and difficulty ER L shoulder with banded exercise. 4/5 L SH ABD strength. Will continue to evaluate next visit. Continues to require physical therapy to return to previous level of function.      SHORT-TERM GOALS: by 6/05/24, continued to 7/08/24  1. Patient scores at least 16/30 on FGA.  MET.  2. Patient walks at least 1500 feet during 6 minute walk test. NOT MET.  3. Patient safely walks within the home with a cane.  MET.    LONG-TERM GOALS: by 7/22/24   1. Patient returns to prior level of community walking.  NOT MET, ELIZABETH OR SON PICKING UP GROCERIES AT THIS TIME, PICKED UP AND DROPPED OFF TOO.    2. Patient scores at least 23/30 on FGA for safe mobility without assistive device.  MET.  3. Patient able to safely participate in walking on the beach during her family vacation in early August.     PLAN OF CARE:  Patient will benefit from physical therapy 2 times per week for 1 month  Neuromuscular re-education, therapeutic exercises, and therapeutic activities as " outlined in grids.

## 2024-07-30 ENCOUNTER — OFFICE VISIT (OUTPATIENT)
Facility: REHABILITATION | Age: 69
End: 2024-07-30
Payer: COMMERCIAL

## 2024-07-30 ENCOUNTER — APPOINTMENT (OUTPATIENT)
Dept: OCCUPATIONAL THERAPY | Facility: REHABILITATION | Age: 69
End: 2024-07-30
Payer: COMMERCIAL

## 2024-07-30 DIAGNOSIS — I63.9 CEREBROVASCULAR ACCIDENT (CVA), UNSPECIFIED MECHANISM (HCC): Primary | ICD-10-CM

## 2024-07-30 PROCEDURE — 97530 THERAPEUTIC ACTIVITIES: CPT | Performed by: PHYSICAL THERAPIST

## 2024-07-30 PROCEDURE — 97112 NEUROMUSCULAR REEDUCATION: CPT | Performed by: PHYSICAL THERAPIST

## 2024-07-30 NOTE — PROGRESS NOTES
"PHYSICAL THERAPY TREATMENT    Date: 24  Name: Sanam Castro  : 1955  Referring Provider: Dia Redman MD  AUTHORIZATION:   Insurance: Payor: [employer]  REP / Plan: [employer] MEDICARE ADVANTRA / Product Type: Medicare HMO /     SUBJECTIVE:  HPI: Sanam Castro is a 68 y.o. female referred to outpatient physical therapy for the following diagnosis   Encounter Diagnosis   Name Primary?    Cerebrovascular accident (CVA), unspecified mechanism (HCC) Yes     24  She is leaving for her beach trip on Saturday and will return to PT at the end of August.      Patient-Specific Functional Scale   Task is scored 0 (unable to perform activity) to 10 (ability to perform activity independently)  Activity     Get back to being active.     2.   Not being reliant on the walker.     3.   Return to driving.           OBJECTIVE:    Precautions - fall risk    24  Continued working on dynamic and reactive balance, walking on uneven surface and obstacle negotiation.  Retest mobility measures upon follow-up.      Specialty Treatment Diary  24   Home exercise program       (Ther-ex, neuromuscular re-ed)Walking/endurance       (Neuromuscular re-ed) Static and dynamic balance/anticipatory / reactive Blaze pods in [employer], 4 targets with mat with foam pads randomly underneath, with 2 6\" hurdles on top of mat  1 min x 3 forward facing  8 targets  7 targets  8 targets    1 min x 3 laterally  6 targets  6 targets  6 targets    Blaze pods in [employer], 4 targets with mat with foam pads randomly underneath, with 2 10\" hurdles, foam incline  1 min x 3 forward facing  6 targets  7 targets  6 targets    Blaze pods in [employer], 4 targets with steps, foam pad, and 2 10\" hurdles  1 min x 3 forward facing  7 targets  6 targets  7 targets    1 min x 3 laterally  6 targets  7 targets  7 targets    Tennis volley with 8\" ball 2 x 3 min. Outcome measures    Overground walking as fast as possible 100 feet x " "4 sets  Best 19.6 seconds    Overground 2\" foam over 2x8\" foam blocks  16' line in [facility]  1 min x 3 sets   Best 12 7/10 RPS    Similar in [facility]  Tapping 6 targets knee to hip high with 9 lb water weight  1 min x 3 sets   Overground walking,       Resisted walking #10 in [facility]      Blaze pods in [facility], 6 targets with mat with foam pads randomly underneath, with 3 circles as targets to walk around   1 min. x 3   8 targets  10 targets  14 targets  RPE 15 when unable to step off the mat      Blaze pods, 6 targets  With foam incline, 2 air ex foam pads, 6\" marc  1 min. x 3 sets  6 targets  11 targets  RPE 13       Blaze pods with obstacles mixed obstacles, 2 6\" & 3 12\"   6 targets  1 min. X 3  8 targets  9 targets  RPE 15     Same as above, must go laterally  1 min. X 1  7 targets     Tennis volley with 8\" ball x 5 min.     Overground walking in [facility], 50' laps   x1  11 sec.    Overground walking in [facility] 100' laps  x1  21.5 sec.    Resisted walking #20 in [facility], 100' laps  x1  24 sec.    *decreased to #10 based on reported LBP*  Resisted walking #10 in [facility], 100' laps  x4  22.9 sec.  22.5 sec.  21.5 sec.  21.9 sec.     Resisted walking #10 in [facility], 200' lap  (100' x 2 laps)  x1  46.3 sec.    Rest    Blaze pods in [facility], 6 targets with mat with foam pads randomly underneath  1 min. x 1 set  RPE 12    Same as above,  - Added 3 circles as targets to walk around for challenge  1 min. x 2 sets  RPE 15 after 1st round w/ targets  RPE 17 after 2nd round w/ targets    Rest    Blaze pods, 6 targets  With foam incline, 1 air ex foam pad, 1 6\" marc, 1 small 6\" foam balance beam pad  1 min. x 3 sets  RPE 17 after 2nd and 3rd round    (Ther-ex) Strengthening       (There-ex) Stretching                  Mobility Measures 7/25/24 6/25/24 6/20/24 6/12/24 5/22/24   5 Time Sit to Stand  (17\" chair, arms across chest) 12.6 seconds 16.3 seconds 20.1 seconds 18.2 seconds 14.7 seconds   3 Meter Timed  Up & " "Go 6.9 seconds   7.4 seconds 8.9 seconds   Walking speed (self-selected)        Functional Gait Assessment (see below) 27/30 22/30 18/30 19/30 12/30   6 Minute Walk Test (100 foot circular course) 1230 feet  No falls   No assistive device     Completed without assistive device with close supervision only  1160 feet no falls no foot scuffing Completed without assistive device in [facility]    1116 feet no falls or loss of balance, mild foot scuffing 5 minutes into walking  87 bpm 97% SpO2      1300 feet with rolling walker    Ending heart rate 90s bpm   Patient-Reported Outcome Measure: Activities-Specific Balance Confidence Scale (ABC Scale)   89%  49%       ASSESSMENT:  Jaimie is a 68 year old female with mobility limitations following CVA 5/04/24.      7/30/24  Jaimie was able to maintain good dynamic and reactive balance today. Uneven surface negotiation was continued with the addition of hurdles to clear. 4\" hurdles were cleared without difficulty both going forward and laterally. The most challenging obstacle for her to negotiate was the 10\" hurdles. Stair negotiation was incorporated today and tolerated well. She used bilateral handrails due to fear of falling. She will continue to benefit from skilled physical therapy.    7/25/24  Good improvement in mobility measures, especially dynamic balance.  Safe for ambulation without assistive device.  Continue to work on endurance and \"balance endurance\" to maintain good dynamic balance with extended household and community ambuation.      7/23/24  Improvement in navigating uneven surfaces, negotiating obstacles, and directional changes with movement with less LOB, especially in posterior direction. Walking around obstacles/ targets on uneven targets was the most challenging task for her today. Continued improvement with reactive balance including blaze pod activities and ability to maintain tennis volley while moving in all directions and switching between james and " "backhand today, making progress to return to playing tennis. Continued difficulty moving from one uneven surface to another and navigating obstacles on uneven surfaces. Increased width of the space between air ex foam pad and incline mat to allow for a step between the two during blaze pod course today due to patient reported difficulty (RPE 17 before pad was moved further apart from foam incline). Will continue working on gait speed and walking with directional changes, uneven surfaces, and obstacles. Cleared all obstacles including 12\". Add treadmill and tasks involving narrow base of support next session. Will add stair training in future sessions to improve functional mobility and achieve higher FGA score in stair negotiation.    7/18/24  Continuing to progress with increasing gait speed, endurance, dynamic and reactive balance, disc grabs on periphery of vision, and walking on uneven surfaces. Some difficulty grabbing discs with L hand. Difficulty walking on uneven surfaces, especially with targets to avoid with blaze pods and switching from level surfaces to uneven surfaces. Will add 12\" obstacles to blaze pod course next time. Will continue working on walking with narrow base of support on uneven surfaces with obstacles. Will add treadmill and incorporate dual tasks next session.     7/15/24  Continued difficulty navigating obstacles with uneven surfaces. Modified obstacle course to allow for a space between 12\" obstacles and air ex foam pads. Will continue to work on dynamic and reactive balance, walking with narrow base of support. Will do treadmill and incorporate dual tasks next session, per previous session notes.     7/10/24   Had a high degree of difficulty with performance of foam beams. Did much better hurdles today per patient, able to traverse the 12\"  hurdles without much loss of balance. Able to use a mix of hip and stepping strategy to correct his balance. Used SOLO throughout session for fall " "prevention. Had some difficulty with dual tasking, would benefit from this being incorporated throughout treatment plans. Would probably try treadmill again as patient has made some great progress and would tolerate this better. Appropriate and challenging RPE throughout session.     7/08/24  Continuing to progress with dynamic and reactive balance, walking on inclines and uneven surfaces, and walking over obstacles. Demonstrates improvement in correcting episodes of LOB while navigating uneven surfaces and dynamic and reactive balance including blaze pod targets and hitting a small 8\"ball with a tennis racquet back and forth at faster speeds. Will continue to work on balance and walking on uneven surfaces to work towards patient goals of going to the beach with her family and returning to previous level of function.     7/03/24  Similar to yesterday. Continues to progress with reactive balance and cardiovascular endurance. Worked on cardiovascular endurance activities for longer periods of time compared to previous sessions including tennis and soccer. Demonstrating improvement in L sided functional mobility during activities such as hitting backhands (one-handed, using R hand) in tennis and kicking a soccer ball with L foot but needs continued improvement with coordinating movements. Continue to challenge reactive and dynamic balance, cardiovascular endurance, and gait speed for safe ambulation. Will continue to encourage use of L sided functional activities to return to previous level of function including playing tennis and pickleball.     7/02/24  Jaimie continues to progress with reactive balance and exercise tolerance. Tolerated longer durations of tennis and soccer today. Kicked soccer ball with L foot with more accuracy more consistently today compared to previous. Will continue challenging balance and working on uneven surfaces with directional changes to work towards goals of being able to walk on the beach " "with family in August.    6/27/24  Jaimie is a 68 year old female with mobility limitations following CVA 5/04/24.      Jaimie shows improvement in reactive balance and gait. Able to complete activities with uneven surfaces with less supervision. Assessed L shoulder due to upcoming MRI. Reports L shoulder pain post-CVA with UE movement including overhead movements, full range flexion, full range ABD and ther ex including banded Shoulder ER \"no moneys\" with yellow theraband and scapular rows with red theraband. Demonstrated difficulty with keeping L elbow at side due to L shoulder pain during rows and difficulty ER L shoulder with banded exercise. 4/5 L SH ABD strength. Will continue to evaluate next visit. Continues to require physical therapy to return to previous level of function.    PLAN:  Continue to progress balance and endurance activities as tolerated.    SHORT-TERM GOALS: by 6/05/24, continued to 7/08/24  1. Patient scores at least 16/30 on FGA.  MET.  2. Patient walks at least 1500 feet during 6 minute walk test. NOT MET.  3. Patient safely walks within the home with a cane.  MET.    LONG-TERM GOALS: by 7/22/24   1. Patient returns to prior level of community walking.  NOT MET, ELIZABETH OR SON PICKING UP GROCERIES AT THIS TIME, PICKED UP AND DROPPED OFF TOO.    2. Patient scores at least 23/30 on FGA for safe mobility without assistive device.  MET.  3. Patient able to safely participate in walking on the beach during her family vacation in early August.     PLAN OF CARE:  Patient will benefit from physical therapy 2 times per week for 1 month  Neuromuscular re-education, therapeutic exercises, and therapeutic activities as outlined in grids.    Treatment by Shanita Johnson, SPT, under direct supervision of Gerald Galvez, LIZZETTE.        "

## 2024-08-01 ENCOUNTER — APPOINTMENT (OUTPATIENT)
Facility: REHABILITATION | Age: 69
End: 2024-08-01
Payer: COMMERCIAL

## 2024-08-01 ENCOUNTER — APPOINTMENT (OUTPATIENT)
Dept: OCCUPATIONAL THERAPY | Facility: REHABILITATION | Age: 69
End: 2024-08-01
Payer: COMMERCIAL

## 2024-08-01 ENCOUNTER — TELEPHONE (OUTPATIENT)
Age: 69
End: 2024-08-01

## 2024-08-01 NOTE — TELEPHONE ENCOUNTER
Wanted to see if we had any appt avail at a different time or later date. Nothing until Oct. So will keep appt for now. Added to waitlist.

## 2024-08-15 ENCOUNTER — VBI (OUTPATIENT)
Dept: ADMINISTRATIVE | Facility: OTHER | Age: 69
End: 2024-08-15

## 2024-08-15 NOTE — TELEPHONE ENCOUNTER
08/15/24 12:50 PM     Chart reviewed for CRC: Colonoscopy and Mammogram ; nothing is submitted to the patient's insurance at this time. lvh    Sabrina Barnard   [facility]

## 2024-08-22 ENCOUNTER — TELEPHONE (OUTPATIENT)
Dept: VASCULAR SURGERY | Facility: CLINIC | Age: 69
End: 2024-08-22

## 2024-08-22 ENCOUNTER — OFFICE VISIT (OUTPATIENT)
Dept: VASCULAR SURGERY | Facility: CLINIC | Age: 69
End: 2024-08-22
Payer: COMMERCIAL

## 2024-08-22 VITALS
HEIGHT: 69 IN | BODY MASS INDEX: 22.96 KG/M2 | OXYGEN SATURATION: 99 % | HEART RATE: 57 BPM | DIASTOLIC BLOOD PRESSURE: 70 MMHG | SYSTOLIC BLOOD PRESSURE: 116 MMHG | WEIGHT: 155 LBS

## 2024-08-22 DIAGNOSIS — I65.22 ASYMPTOMATIC STENOSIS OF LEFT CAROTID ARTERY: Primary | ICD-10-CM

## 2024-08-22 PROCEDURE — 99214 OFFICE O/P EST MOD 30 MIN: CPT | Performed by: SURGERY

## 2024-08-22 RX ORDER — CHLORHEXIDINE GLUCONATE ORAL RINSE 1.2 MG/ML
15 SOLUTION DENTAL ONCE
OUTPATIENT
Start: 2024-08-22 | End: 2024-08-22

## 2024-08-22 RX ORDER — CEFAZOLIN SODIUM 2 G/50ML
2000 SOLUTION INTRAVENOUS ONCE
OUTPATIENT
Start: 2024-08-22 | End: 2024-08-22

## 2024-08-22 NOTE — TELEPHONE ENCOUNTER
Called patients cardiologist office for CC patient seen Dr. Tavares 8-21-24 faxing over clearance form to [phone]

## 2024-08-22 NOTE — ASSESSMENT & PLAN NOTE
68-year-old female with a past medical history of hypertension, HLD, subarachnoid hemorrhage who was hospitalized in May 2024 for a right hemispheric stroke.  Patient had presented to the hospital with left-sided weakness and jumbled speech.  MRI showed a right hemispheric stroke. CTA head and neck showed a severe left carotid stenosis. She has been going to PT since discharged and has had nearly 100% recovery of symptoms. She is feeling well and reports she only has a couple sessions left with PT     I reviewed the patient's imaging, duplex showed left prox. ICA   Ratio 8.46 which correlates with the CTA finding of severe left carotid stenosis.   Given the severity of the stenosis, it does meet criteria for repair for an asymptomatic lesion.    I am happy to see the patient has recovered so well.  At this point I believe that we can discuss proceeding with a left carotid endarterectomy which she states she is ready for.  We discussed the procedure and risks which include bleeding, infection, restenosis, nerve injury and stroke.  Patient understands and is agreeable to proceed.  Consent signed in office.  Patient has recently started seeing a new cardiologist which we will reach out to and obtain a clearance for surgery

## 2024-08-22 NOTE — PROGRESS NOTES
Ambulatory Visit  Name: Sanam Castro      : 1955      MRN: [id number]  Encounter Provider: Krish Galloway MD  Encounter Date: 2024   Enc[facility] department: [facility] [place]    Assessment & Plan   1. Asymptomatic stenosis of left carotid artery  Assessment & Plan:  68-year-old female with a past medical history of hypertension, HLD, subarachnoid hemorrhage who was hospitalized in May 2024 for a right hemispheric stroke.  Patient had presented to the hospital with left-sided weakness and jumbled speech.  MRI showed a right hemispheric stroke. CTA head and neck showed a severe left carotid stenosis. She has been going to PT since discharged and has had nearly 100% recovery of symptoms. She is feeling well and reports she only has a couple sessions left with PT     I reviewed the patient's imaging, duplex showed left prox. ICA   Ratio 8.46 which correlates with the CTA finding of severe left carotid stenosis.   Given the severity of the stenosis, it does meet criteria for repair for an asymptomatic lesion.    I am happy to see the patient has recovered so well.  At this point I believe that we can discuss proceeding with a left carotid endarterectomy which she states she is ready for.  We discussed the procedure and risks which include bleeding, infection, restenosis, nerve injury and stroke.  Patient understands and is agreeable to proceed.  Consent signed in office.  Patient has recently started seeing a new cardiologist which we will reach out to and obtain a clearance for surgery  Orders:  -     Case request operating room: ENDARTERECTOMY ARTERY CAROTID; Standing  -     Basic metabolic panel; Future  -     CBC and Platelet; Future  -     Protime-INR; Future  -     Case request operating room: ENDARTERECTOMY ARTERY CAROTID      History of Present Illness       Sanam Castro is a 68 y.o. female      Patient, with history of stroke in May 2024, presents for  "follow up and discuss potential procedure. Her most recent testing was a CV done 5/7/24 and CTA head/neck done 5/4/24. She denies any new TIA/CVA symptoms. She states she is going to physical therapy and has a few more sessions. She is taking ASA 81 mg and Atorvastatin. She is a former smoker.    Review of Systems   Constitutional: Negative.    HENT: Negative.     Eyes: Negative.  Negative for visual disturbance.   Respiratory: Negative.     Cardiovascular: Negative.    Gastrointestinal: Negative.    Endocrine: Negative.    Genitourinary: Negative.    Musculoskeletal: Negative.    Skin: Negative.    Neurological:  Negative for dizziness, facial asymmetry, speech difficulty, weakness, light-headedness and headaches.   Hematological: Negative.    Psychiatric/Behavioral: Negative.       Medical History Reviewed by provider this encounter:  Tobacco  Allergies  Meds  Problems  Med Hx  Surg Hx  Fam Hx       Objective     /70 (BP Location: Left arm, Patient Position: Sitting, Cuff Size: Standard)   Pulse 57   Ht 5' 9\" (1.753 m)   Wt 70.3 kg (155 lb)   SpO2 99%   BMI 22.89 kg/m²     Physical Exam  Vitals and nursing note reviewed.   Constitutional:       General: She is not in acute distress.     Appearance: She is well-developed.   HENT:      Head: Normocephalic and atraumatic.   Eyes:      Conjunctiva/sclera: Conjunctivae normal.   Cardiovascular:      Rate and Rhythm: Normal rate and regular rhythm.   Pulmonary:      Effort: Pulmonary effort is normal.      Breath sounds: Normal breath sounds.   Abdominal:      Palpations: Abdomen is soft.      Tenderness: There is no abdominal tenderness.   Musculoskeletal:      Cervical back: Neck supple.   Skin:     General: Skin is warm and dry.      Capillary Refill: Capillary refill takes less than 2 seconds.   Neurological:      Mental Status: She is alert.   Psychiatric:         Mood and Affect: Mood normal.       Administrative Statements   I have spent a total " time of 35 minutes in caring for this patient on the day of the visit/encounter including Diagnostic results, Prognosis, Risks and benefits of tx options, Instructions for management, Patient and family education, Importance of tx compliance, Risk factor reductions, Impressions, Counseling / Coordination of care, Documenting in the medical record, Reviewing / ordering tests, medicine, procedures  , and Obtaining or reviewing history  .      Operative Scheduling Information:    Hospital:  [facility]    Physician:  Laverne    Surgery: Left carotid endarterectomy    Urgency:  Standard    Level:  Level 4: Outpatients to be scheduled for screening procedures and elective surgery that can be delayed for longer than one month without reasonable expectation of detriment to patient.    Case Length:  3 hours    Post-op Bed:  ICU    OR Table:  Standard    Equipment Needs:  Vascular technologist and Nuvasive    Medication Instructions:  Aspirin:   Continue (do not hold)    Hydration:  No    Contrast Allergy:  no

## 2024-08-22 NOTE — LETTER
24    Re: Cardiology  Clearance    Patient Name: Sanam Castro   Patient : 1955   Patient MRN: [id number]   Patient Phone: [phone]     Dr. Tavares ,    Dr. Krish Galloway MD is requesting clearance for above patient, prior to proceeding with carotid endarterectomy (CEA) / patch angioplasty .  Patient's procedure will be scheduled at [facility] once clearance has been obtained.  Patient will be given general anesthesia.    We spoke with your office today regarding clearance request.   patient informed us that patient was recently seen and may not require an appointment with you.  They informed us that they will reach out to the patient to schedule if needed.    Please fax your recommendations, including any medication recommendations, to [phone], attention nursing.  Or route your recommendations to [facility] pool [facility] Clearance Pool [[id number]].     Please reach out with any questions or concerns.    Sincerely,    [facility] [place]

## 2024-08-22 NOTE — TELEPHONE ENCOUNTER
REMINDER: Under Reason For Call, comments MUST be formatted as:   (Surgeon's Initials) / (Procedure)      Special Instructions / FYI: [facility] pt[email or web address] 08/21/23    Consent: I certify that patient has signed, printed, timed, and dated their surgery consent.  I certify that the patient's LEGAL NAME and DATE OF BIRTH are written in the upper left corner on BOTH sides of the consent.  I certify that BOTH sides of the completed surgery consent have been scanned into the patient's Epic chart by myself on 8/22/2024.  Yes, I have LABELED the consent in Epic as Consent for Vascular Procedure.     For Surgical Clearances     Levels   1-3   ROUTE this encounter to [facility] Clearance Pool (AND)   [facility] Surgery Coordinator Pool     Level   4   ROUTE this encounter to [facility] Surgery Coordinator Pool       HYDRATION CLEARANCES   ONLY ROUTE TO  [facility] Clearance Pool       Yes, I have ROUTED this encounter to [facility] Surgery Coordinator and/or [facility] Clearance Pool.

## 2024-08-23 ENCOUNTER — TELEPHONE (OUTPATIENT)
Age: 69
End: 2024-08-23

## 2024-08-23 NOTE — TELEPHONE ENCOUNTER
Pt calls asking if she can take her morning medications before completing blood work ordered by Dr. Galloway.  Advised pt yes.

## 2024-08-27 ENCOUNTER — OFFICE VISIT (OUTPATIENT)
Facility: REHABILITATION | Age: 69
End: 2024-08-27
Payer: COMMERCIAL

## 2024-08-27 DIAGNOSIS — I63.9 CEREBROVASCULAR ACCIDENT (CVA), UNSPECIFIED MECHANISM (HCC): Primary | ICD-10-CM

## 2024-08-27 PROCEDURE — 97530 THERAPEUTIC ACTIVITIES: CPT | Performed by: PHYSICAL THERAPIST

## 2024-08-27 NOTE — PROGRESS NOTES
"PHYSICAL THERAPY TREATMENT / UPDATE    Date: 24  Name: Sanam Castro  : 1955  Referring Provider: Dia Redman MD  AUTHORIZATION:   Insurance: Payor: [employer]  REP / Plan: [facility] MEDICARE ADVANTRA / Product Type: Medicare HMO /     PATIENT HISTORY:  HPI: Sanam Castro is a 68 y.o. female referred to outpatient physical therapy for the following diagnosis   Encounter Diagnosis   Name Primary?    Cerebrovascular accident (CVA), unspecified mechanism (HCC) Yes          Returned from family trip.    On antibiotics so not outside as much as not to be exposed to sun too much.  Made it to ocean but not carrying beach chair.    Had appointment with vascular physician.    Still being spoiled, family bringing her groceries, would like to go with family.     In [place], was physically active in pool.  At home in [place], goes to get garbage and mail, cleaning apartment.  Access to long hallway or community room.      Prior to CVA, babysat for 2 year old.  Can't carry him anymore.    Okay with getting things up from the ground.     Patient-Specific Functional Scale   Task is scored 0 (unable to perform activity) to 10 (ability to perform activity independently)  Activity     Get back to being active.     2.   Not being reliant on the walker.     3.   Return to driving.           OBJECTIVE:    Precautions - fall risk    24  Continued working on dynamic and reactive balance, walking on uneven surface and obstacle negotiation.  Retest mobility measures upon follow-up.      Specialty Treatment Diary  24   Home exercise program        (Ther-ex, neuromuscular re-ed)Walking/endurance        (Neuromuscular re-ed) Static and dynamic balance/anticipatory / reactive Testing above    5 times sit to stand   18\" chair 5 x 2 sets  + carry 9 lbs 5  16.5\" chair 5 x 2 sets     Step up 8\" back and forth, 1.5 min    Blaze pod reactive balance   Foam incline  Foam under 2\" mat   1 " "min x 5       [facility], 4 targets with mat with foam pads randomly underneath, with 2 6\" hurdles on top of mat  1 min x 3 forward facing  8 targets  7 targets  8 targets    1 min x 3 laterally  6 targets  6 targets  6 targets    [facility] with mat with foam pads randomly underneath, with 2 10\" hurdles, foam incline  1 min x 3 forward facing  6 targets  7 targets  6 targets    Blaze pods in [facility], 4 targets with steps, foam pad, and 2 10\" hurdles  1 min x 3 forward facing  7 targets  6 targets  7 targets    1 min x 3 laterally  6 targets  7 targets  7 targets    Tennis volley with 8\" ball 2 x 3 min. Outcome measures    Overground walking as fast as possible 100 feet x 4 sets  Best 19.6 seconds    Overground 2\" foam over 2x8\" foam blocks  16' line in [facility]  1 min x 3 sets   Best 12 7/10 RPS    Similar in [facility]  Tapping 6 targets knee to hip high with 9 lb water weight  1 min x 3 sets   Overground walking,       Resisted walking #10 in [facility]      [facility], 6 targets with mat with foam pads randomly underneath, with 3 circles as targets to walk around   1 min. x 3   8 targets  10 targets  14 targets  RPE 15 when unable to step off the mat      Blaze pods, 6 targets  With foam incline, 2 air ex foam pads, 6\" marc  1 min. x 3 sets  6 targets  11 targets  RPE 13       Blaze pods with obstacles mixed obstacles, 2 6\" & 3 12\"   6 targets  1 min. X 3  8 targets  9 targets  RPE 15     Same as above, must go laterally  1 min. X 1  7 targets     Tennis volley with 8\" ball x 5 min.     Overground walking in [facility], 50' laps   x1  11 sec.    Overground walking in [facility] 100' laps  x1  21.5 sec.    Resisted walking #20 in [facility], 100' laps  x1  24 sec.    *decreased to #10 based on reported LBP*  Resisted walking #10 in [facility], 100' laps  x4  22.9 sec.  22.5 sec.  21.5 sec.  21.9 sec.     Resisted walking #10 in [facility], 200' lap  (100' x 2 laps)  x1  46.3 " "sec.    Rest    Blaze pods in [facility], 6 targets with mat with foam pads randomly underneath  1 min. x 1 set  RPE 12    Same as above,  - Added 3 circles as targets to walk around for challenge  1 min. x 2 sets  RPE 15 after 1st round w/ targets  RPE 17 after 2nd round w/ targets    Rest    Blaze pods, 6 targets  With foam incline, 1 air ex foam pad, 1 6\" marc, 1 small 6\" foam balance beam pad  1 min. x 3 sets  RPE 17 after 2nd and 3rd round    (Ther-ex) Strengthening        (There-ex) Stretching                    Mobility Measures 8/27/24 7/25/24 6/25/24 6/20/24 6/12/24 5/22/24   5 Time Sit to Stand  (17\" chair, arms across chest) 17.0 sec 12.6 seconds 16.3 seconds 20.1 seconds 18.2 seconds 14.7 seconds   3 Meter Timed  Up & Go 8.5 sec 6.9 seconds   7.4 seconds 8.9 seconds   Walking speed (self-selected)         Functional Gait Assessment (see below) 27/30 27/30 22/30 18/30 19/30 12/30   6 Minute Walk Test (100 foot circular course) 1120 feet 1230 feet  No falls   No assistive device     Completed without assistive device with close supervision only  1160 feet no falls no foot scuffing Completed without assistive device in [facility]    1116 feet no falls or loss of balance, mild foot scuffing 5 minutes into walking  87 bpm 97% SpO2      1300 feet with rolling walker    Ending heart rate 90s bpm   Patient-Reported Outcome Measure: Activities-Specific Balance Confidence Scale (ABC Scale)       4 square step test  11.0 sec  10.0 sec   89%  49%     Functional Gait Assessment  3/3 Gait level surface  3/3 Change in gait speed  3/3 Gait with horizontal head turns  2/3 Gait with vertical head turns  3/3 Gait and pivot turn  3/3 Step over obstacle  3/3 Gait with narrow base of support  3/3 Gait with eyes closed  2/3 Ambulating backwards  2/3 Steps  27/30 Total score (less than 22/30 indicates increased risk of fall)..      ASSESSMENT:  Jaimie is a 68 year old female with mobility limitations following CVA 5/04/24.  " "    8/27/24  Maintained performance in Functional Gait Assessment, some decline in other mobility measures above.  We will work for 1-2 weeks towards prior best level of performance here.  We will work towards incorporating more strengthening exercises in a long-term exercise program.  Retest mobility measures in one to two weeks.      7/30/24  Jaimie was able to maintain good dynamic and reactive balance today. Uneven surface negotiation was continued with the addition of hurdles to clear. 4\" hurdles were cleared without difficulty both going forward and laterally. The most challenging obstacle for her to negotiate was the 10\" hurdles. Stair negotiation was incorporated today and tolerated well. She used bilateral handrails due to fear of falling. She will continue to benefit from skilled physical therapy.    7/25/24  Good improvement in mobility measures, especially dynamic balance.  Safe for ambulation without assistive device.  Continue to work on endurance and \"balance endurance\" to maintain good dynamic balance with extended household and community ambuation.      7/23/24  Improvement in navigating uneven surfaces, negotiating obstacles, and directional changes with movement with less LOB, especially in posterior direction. Walking around obstacles/ targets on uneven targets was the most challenging task for her today. Continued improvement with reactive balance including blaze pod activities and ability to maintain tennis volley while moving in all directions and switching between james and backhand today, making progress to return to playing tennis. Continued difficulty moving from one uneven surface to another and navigating obstacles on uneven surfaces. Increased width of the space between air ex foam pad and incline mat to allow for a step between the two during blaze pod course today due to patient reported difficulty (RPE 17 before pad was moved further apart from foam incline). Will continue working " "on gait speed and walking with directional changes, uneven surfaces, and obstacles. Cleared all obstacles including 12\". Add treadmill and tasks involving narrow base of support next session. Will add stair training in future sessions to improve functional mobility and achieve higher FGA score in stair negotiation.    7/18/24  Continuing to progress with increasing gait speed, endurance, dynamic and reactive balance, disc grabs on periphery of vision, and walking on uneven surfaces. Some difficulty grabbing discs with L hand. Difficulty walking on uneven surfaces, especially with targets to avoid with blaze pods and switching from level surfaces to uneven surfaces. Will add 12\" obstacles to blaze pod course next time. Will continue working on walking with narrow base of support on uneven surfaces with obstacles. Will add treadmill and incorporate dual tasks next session.     7/15/24  Continued difficulty navigating obstacles with uneven surfaces. Modified obstacle course to allow for a space between 12\" obstacles and air ex foam pads. Will continue to work on dynamic and reactive balance, walking with narrow base of support. Will do treadmill and incorporate dual tasks next session, per previous session notes.     7/10/24   Had a high degree of difficulty with performance of foam beams. Did much better hurdles today per patient, able to traverse the 12\"  hurdles without much loss of balance. Able to use a mix of hip and stepping strategy to correct his balance. Used SOLO throughout session for fall prevention. Had some difficulty with dual tasking, would benefit from this being incorporated throughout treatment plans. Would probably try treadmill again as patient has made some great progress and would tolerate this better. Appropriate and challenging RPE throughout session.     7/08/24  Continuing to progress with dynamic and reactive balance, walking on inclines and uneven surfaces, and walking over obstacles. " "Demonstrates improvement in correcting episodes of LOB while navigating uneven surfaces and dynamic and reactive balance including blaze pod targets and hitting a small 8\"ball with a tennis racquet back and forth at faster speeds. Will continue to work on balance and walking on uneven surfaces to work towards patient goals of going to the beach with her family and returning to previous level of function.     7/03/24  Similar to yesterday. Continues to progress with reactive balance and cardiovascular endurance. Worked on cardiovascular endurance activities for longer periods of time compared to previous sessions including tennis and soccer. Demonstrating improvement in L sided functional mobility during activities such as hitting backhands (one-handed, using R hand) in tennis and kicking a soccer ball with L foot but needs continued improvement with coordinating movements. Continue to challenge reactive and dynamic balance, cardiovascular endurance, and gait speed for safe ambulation. Will continue to encourage use of L sided functional activities to return to previous level of function including playing tennis and pickleball.     7/02/24  Jaimie continues to progress with reactive balance and exercise tolerance. Tolerated longer durations of tennis and soccer today. Kicked soccer ball with L foot with more accuracy more consistently today compared to previous. Will continue challenging balance and working on uneven surfaces with directional changes to work towards goals of being able to walk on the beach with family in August.    6/27/24  Jaimie is a 68 year old female with mobility limitations following CVA 5/04/24.      Jaimie shows improvement in reactive balance and gait. Able to complete activities with uneven surfaces with less supervision. Assessed L shoulder due to upcoming MRI. Reports L shoulder pain post-CVA with UE movement including overhead movements, full range flexion, full range ABD and ther ex including " "banded Shoulder ER \"no moneys\" with yellow theraband and scapular rows with red theraband. Demonstrated difficulty with keeping L elbow at side due to L shoulder pain during rows and difficulty ER L shoulder with banded exercise. 4/5 L SH ABD strength. Will continue to evaluate next visit. Continues to require physical therapy to return to previous level of function.    PLAN:  Continue to progress balance and endurance activities as tolerated.    SHORT-TERM GOALS: by 6/05/24, continued to 7/08/24  1. Patient scores at least 16/30 on FGA.  MET.  2. Patient walks at least 1500 feet during 6 minute walk test. NOT MET.  3. Patient safely walks within the home with a cane.  MET.    LONG-TERM GOALS: by 7/22/24   1. Patient returns to prior level of community walking.  NOT MET, ELIZABETH OR SON PICKING UP GROCERIES AT THIS TIME, PICKED UP AND DROPPED OFF TOO.    2. Patient scores at least 23/30 on FGA for safe mobility without assistive device.  MET.  3. Patient able to safely participate in walking on the beach during her family vacation in early August.     PLAN OF CARE:  Patient will benefit from physical therapy 2 times per week for 1 month  Neuromuscular re-education, therapeutic exercises, and therapeutic activities as outlined in grids.        "

## 2024-08-27 NOTE — TELEPHONE ENCOUNTER
Caller: Jannet Castro    Doctor: Dr. Galloway    Reason for call: Inquiring about surgery date. Informed her we were waiting to obtain the cardiac clearance and as soon as we received that we would be giving them a call to schedule.    Call back#: [phone]

## 2024-08-29 ENCOUNTER — APPOINTMENT (OUTPATIENT)
Dept: OCCUPATIONAL THERAPY | Facility: REHABILITATION | Age: 69
End: 2024-08-29
Payer: COMMERCIAL

## 2024-08-29 ENCOUNTER — APPOINTMENT (OUTPATIENT)
Facility: REHABILITATION | Age: 69
End: 2024-08-29
Payer: COMMERCIAL

## 2024-08-29 NOTE — TELEPHONE ENCOUNTER
Spoke to patients cardiologist office to make sure they received the clearance form and they did and forward to the doctor to sign LLF

## 2024-08-29 NOTE — TELEPHONE ENCOUNTER
Left message for patient to call us back so that we can schedule her surgery with Dr. Galloway 9-12,10-2 [facility]

## 2024-08-30 NOTE — TELEPHONE ENCOUNTER
Patient called back and was given dates for her surgery which she wants to have in October I gave her 10-11-24 and 10-16-24 at [facility] she will call us back and let us know what she decides LLF

## 2024-08-30 NOTE — TELEPHONE ENCOUNTER
Verified patient's insurance   CONFIRMED - Patient's insurance is [employer]  Is patient requesting a call when authorization has been obtained? Patient did not request a call.    Surgery Date: 10-16-24  Primary Surgeon: ROSSY // Krish Galloway (NPI: [id number])  Assisting Surgeon: Not Applicable (N/A)  Facility: [facility] (Tax: [id number] / [id number]: [id number])  Inpatient / Outpatient: Inpatient  Level: 4    Clearance Received: Yes, Cardiology .  Consent Received: Yes, scanned into Epic on 8-22-24.  Medication Hold / Last Dose:  No hold on AsA  IR Notified: Not Applicable (N/A)  Rep. Notified:  Nuvasive   Equipment Needs: Not Applicable (N/A)  Vas Lab Requested:  Yes  Patient Contacted: 8-30-24    Diagnosis: I65.22  Procedure/ CPT Code(s): (CEA) Carotid Endarterectomy / Patch Angioplasty // CPT: 29055    For varicose vein related procedures:   Last LEVDR: Not Applicable (N/A)  CEAP Classification: Not Applicable (N/A)  VCSS: Not Applicable (N/A)    Post Operative Date/ Time: TBD     *Please review medication hold(s), PATs, and check H&P with patient.*  PATIENT WAS MAILED SURGERY/SHOWERING/DISCHARGE/COVID INSTRUCTIONS AFTER REVIEWING WITH THEM VIA PHONE CALL.      Spoke to patient to schedule surgery LL

## 2024-09-03 ENCOUNTER — OFFICE VISIT (OUTPATIENT)
Facility: REHABILITATION | Age: 69
End: 2024-09-03
Payer: COMMERCIAL

## 2024-09-03 ENCOUNTER — EVALUATION (OUTPATIENT)
Dept: OCCUPATIONAL THERAPY | Facility: REHABILITATION | Age: 69
End: 2024-09-03
Payer: COMMERCIAL

## 2024-09-03 DIAGNOSIS — I63.9 CEREBROVASCULAR ACCIDENT (CVA), UNSPECIFIED MECHANISM (HCC): Primary | ICD-10-CM

## 2024-09-03 PROCEDURE — 97112 NEUROMUSCULAR REEDUCATION: CPT | Performed by: OCCUPATIONAL THERAPIST

## 2024-09-03 PROCEDURE — 97530 THERAPEUTIC ACTIVITIES: CPT | Performed by: PHYSICAL THERAPIST

## 2024-09-03 PROCEDURE — 97150 GROUP THERAPEUTIC PROCEDURES: CPT | Performed by: OCCUPATIONAL THERAPIST

## 2024-09-03 PROCEDURE — 97110 THERAPEUTIC EXERCISES: CPT | Performed by: PHYSICAL THERAPIST

## 2024-09-03 NOTE — PROGRESS NOTES
"PHYSICAL THERAPY TREATMENT / UPDATE    Date: 24  Name: Sanam Castro  : 1955  Referring Provider: Dia Redman MD  AUTHORIZATION:   Insurance: Payor: [employer]  REP / Plan: [facility] MEDICARE ADVANTRA / Product Type: Medicare HMO /     PATIENT HISTORY:  HPI: Sanam Castro is a 68 y.o. female referred to outpatient physical therapy for the following diagnosis   Encounter Diagnosis   Name Primary?    Cerebrovascular accident (CVA), unspecified mechanism (HCC) Yes       Talked to vascular, will go for operation 10/16/24.    Patient-Specific Functional Scale   Task is scored 0 (unable to perform activity) to 10 (ability to perform activity independently)  Activity     Get back to being active.     2.   Not being reliant on the walker.     3.   Return to driving.           OBJECTIVE:  Precautions - fall risk    24  Continued working on dynamic and reactive balance, walking on uneven surface and obstacle negotiation.  Retest mobility measures upon follow-up.      Specialty Treatment Diary  24   Home exercise program         (Ther-ex, neuromuscular re-ed)Walking/endurance         (Neuromuscular re-ed) Static and dynamic balance/anticipatory / reactive Overground walking timed 100 feet as fast as comfortable  About 1000 feet    Sit to stand 18\" chair   5 x as fast as possible  Best 16 sec  X 3 sets  16\" step 5 x 2 sets    Up and back 8\" step  45 sec    Blaze pods  In solostep  8\" step  Foam incline  1 min x 3 sets  Then foam incline solely  1 min x 3 sets    Getting to foam incline tossing bean bags at basketball hoop  3 min    Walking passing soccer ball 2 min  Small range movements batting ball with tennis racquet  In solostep  3 min     Testing above    5 times sit to stand   18\" chair 5 x 2 sets  + carry 9 lbs 5  16.5\" chair 5 x 2 sets     Step up 8\" back and forth, 1.5 min    Blaze pod reactive balance   Foam incline  Foam under 2\" mat   1 min x " "5       [facility] with mat with foam pads randomly underneath, with 2 6\" hurdles on top of mat  1 min x 3 forward facing  8 targets  7 targets  8 targets    1 min x 3 laterally  6 targets  6 targets  6 targets    Blaze pods in [employer], 4 targets with mat with foam pads randomly underneath, with 2 10\" hurdles, foam incline  1 min x 3 forward facing  6 targets  7 targets  6 targets    Blaze pods in [facility], 4 targets with steps, foam pad, and 2 10\" hurdles  1 min x 3 forward facing  7 targets  6 targets  7 targets    1 min x 3 laterally  6 targets  7 targets  7 targets    Tennis volley with 8\" ball 2 x 3 min. Outcome measures    Overground walking as fast as possible 100 feet x 4 sets  Best 19.6 seconds    Overground 2\" foam over 2x8\" foam blocks  16' line in [facility]  1 min x 3 sets   Best 12 7/10 RPS    Similar in [facility]  Tapping 6 targets knee to hip high with 9 lb water weight  1 min x 3 sets   Overground walking,       Resisted walking #10 in [facility]      Blaze pods in [employer], 6 targets with mat with foam pads randomly underneath, with 3 circles as targets to walk around   1 min. x 3   8 targets  10 targets  14 targets  RPE 15 when unable to step off the mat      Blaze pods, 6 targets  With foam incline, 2 air ex foam pads, 6\" marc  1 min. x 3 sets  6 targets  11 targets  RPE 13       Blaze pods with obstacles mixed obstacles, 2 6\" & 3 12\"   6 targets  1 min. X 3  8 targets  9 targets  RPE 15     Same as above, must go laterally  1 min. X 1  7 targets     Tennis volley with 8\" ball x 5 min.     Overground walking in [facility], 50' laps   x1  11 sec.    Overground walking in [facility] 100' laps  x1  21.5 sec.    Resisted walking #20 in [facility], 100' laps  x1  24 sec.    *decreased to #10 based on reported LBP*  Resisted walking #10 in [facility], 100' laps  x4  22.9 sec.  22.5 sec.  21.5 sec.  21.9 sec.     Resisted walking #10 in [facility], 200' lap  (100' x 2 laps)  x1  46.3 " "sec.    Rest    Blaze pods in [facility], 6 targets with mat with foam pads randomly underneath  1 min. x 1 set  RPE 12    Same as above,  - Added 3 circles as targets to walk around for challenge  1 min. x 2 sets  RPE 15 after 1st round w/ targets  RPE 17 after 2nd round w/ targets    Rest    Blaze pods, 6 targets  With foam incline, 1 air ex foam pad, 1 6\" marc, 1 small 6\" foam balance beam pad  1 min. x 3 sets  RPE 17 after 2nd and 3rd round    (Ther-ex) Strengthening         (There-ex) Stretching                      Mobility Measures 8/27/24 7/25/24 6/25/24 6/20/24 6/12/24 5/22/24   5 Time Sit to Stand  (17\" chair, arms across chest) 17.0 sec 12.6 seconds 16.3 seconds 20.1 seconds 18.2 seconds 14.7 seconds   3 Meter Timed  Up & Go 8.5 sec 6.9 seconds   7.4 seconds 8.9 seconds   Walking speed (self-selected)         Functional Gait Assessment (see below) 27/30 27/30 22/30 18/30 19/30 12/30   6 Minute Walk Test (100 foot circular course) 1120 feet 1230 feet  No falls   No assistive device     Completed without assistive device with close supervision only  1160 feet no falls no foot scuffing Completed without assistive device in [facility]    1116 feet no falls or loss of balance, mild foot scuffing 5 minutes into walking  87 bpm 97% SpO2      1300 feet with rolling walker    Ending heart rate 90s bpm   Patient-Reported Outcome Measure: Activities-Specific Balance Confidence Scale (ABC Scale)       4 square step test  11.0 sec  10.0 sec   89%  49%     Functional Gait Assessment  3/3 Gait level surface  3/3 Change in gait speed  3/3 Gait with horizontal head turns  2/3 Gait with vertical head turns  3/3 Gait and pivot turn  3/3 Step over obstacle  3/3 Gait with narrow base of support  3/3 Gait with eyes closed  2/3 Ambulating backwards  2/3 Steps  27/30 Total score (less than 22/30 indicates increased risk of fall)..      ASSESSMENT:  Jaimie is a 68 year old female with mobility limitations following CVA 5/04/24.  " "    9/03/24  Practice sit to stand at home.  Performed well with dynamic balance tasks today.  Staying active at home, discharge from physical therapy 9/05/24.    8/27/24  Maintained performance in Functional Gait Assessment, some decline in other mobility measures above.  We will work for 1-2 weeks towards prior best level of performance here.  We will work towards incorporating more strengthening exercises in a long-term exercise program.  Retest mobility measures in one to two weeks.      7/30/24  Jaimie was able to maintain good dynamic and reactive balance today. Uneven surface negotiation was continued with the addition of hurdles to clear. 4\" hurdles were cleared without difficulty both going forward and laterally. The most challenging obstacle for her to negotiate was the 10\" hurdles. Stair negotiation was incorporated today and tolerated well. She used bilateral handrails due to fear of falling. She will continue to benefit from skilled physical therapy.    7/25/24  Good improvement in mobility measures, especially dynamic balance.  Safe for ambulation without assistive device.  Continue to work on endurance and \"balance endurance\" to maintain good dynamic balance with extended household and community ambuation.      7/23/24  Improvement in navigating uneven surfaces, negotiating obstacles, and directional changes with movement with less LOB, especially in posterior direction. Walking around obstacles/ targets on uneven targets was the most challenging task for her today. Continued improvement with reactive balance including blaze pod activities and ability to maintain tennis volley while moving in all directions and switching between james and backhand today, making progress to return to playing tennis. Continued difficulty moving from one uneven surface to another and navigating obstacles on uneven surfaces. Increased width of the space between air ex foam pad and incline mat to allow for a step between " "the two during blaze pod course today due to patient reported difficulty (RPE 17 before pad was moved further apart from foam incline). Will continue working on gait speed and walking with directional changes, uneven surfaces, and obstacles. Cleared all obstacles including 12\". Add treadmill and tasks involving narrow base of support next session. Will add stair training in future sessions to improve functional mobility and achieve higher FGA score in stair negotiation.    7/18/24  Continuing to progress with increasing gait speed, endurance, dynamic and reactive balance, disc grabs on periphery of vision, and walking on uneven surfaces. Some difficulty grabbing discs with L hand. Difficulty walking on uneven surfaces, especially with targets to avoid with blaze pods and switching from level surfaces to uneven surfaces. Will add 12\" obstacles to blaze pod course next time. Will continue working on walking with narrow base of support on uneven surfaces with obstacles. Will add treadmill and incorporate dual tasks next session.     7/15/24  Continued difficulty navigating obstacles with uneven surfaces. Modified obstacle course to allow for a space between 12\" obstacles and air ex foam pads. Will continue to work on dynamic and reactive balance, walking with narrow base of support. Will do treadmill and incorporate dual tasks next session, per previous session notes.     7/10/24   Had a high degree of difficulty with performance of foam beams. Did much better hurdles today per patient, able to traverse the 12\"  hurdles without much loss of balance. Able to use a mix of hip and stepping strategy to correct his balance. Used SOLO throughout session for fall prevention. Had some difficulty with dual tasking, would benefit from this being incorporated throughout treatment plans. Would probably try treadmill again as patient has made some great progress and would tolerate this better. Appropriate and challenging RPE " "throughout session.     7/08/24  Continuing to progress with dynamic and reactive balance, walking on inclines and uneven surfaces, and walking over obstacles. Demonstrates improvement in correcting episodes of LOB while navigating uneven surfaces and dynamic and reactive balance including blaze pod targets and hitting a small 8\"ball with a tennis racquet back and forth at faster speeds. Will continue to work on balance and walking on uneven surfaces to work towards patient goals of going to the beach with her family and returning to previous level of function.     7/03/24  Similar to yesterday. Continues to progress with reactive balance and cardiovascular endurance. Worked on cardiovascular endurance activities for longer periods of time compared to previous sessions including tennis and soccer. Demonstrating improvement in L sided functional mobility during activities such as hitting backhands (one-handed, using R hand) in tennis and kicking a soccer ball with L foot but needs continued improvement with coordinating movements. Continue to challenge reactive and dynamic balance, cardiovascular endurance, and gait speed for safe ambulation. Will continue to encourage use of L sided functional activities to return to previous level of function including playing tennis and pickleball.     7/02/24  Jaimie continues to progress with reactive balance and exercise tolerance. Tolerated longer durations of tennis and soccer today. Kicked soccer ball with L foot with more accuracy more consistently today compared to previous. Will continue challenging balance and working on uneven surfaces with directional changes to work towards goals of being able to walk on the beach with family in August.    6/27/24  Jaimie is a 68 year old female with mobility limitations following CVA 5/04/24.      Jaimie shows improvement in reactive balance and gait. Able to complete activities with uneven surfaces with less supervision. Assessed L " "shoulder due to upcoming MRI. Reports L shoulder pain post-CVA with UE movement including overhead movements, full range flexion, full range ABD and ther ex including banded Shoulder ER \"no moneys\" with yellow theraband and scapular rows with red theraband. Demonstrated difficulty with keeping L elbow at side due to L shoulder pain during rows and difficulty ER L shoulder with banded exercise. 4/5 L SH ABD strength. Will continue to evaluate next visit. Continues to require physical therapy to return to previous level of function.    PLAN:  Continue to progress balance and endurance activities as tolerated.    SHORT-TERM GOALS: by 6/05/24, continued to 7/08/24  1. Patient scores at least 16/30 on FGA.  MET.  2. Patient walks at least 1500 feet during 6 minute walk test. NOT MET.  3. Patient safely walks within the home with a cane.  MET.    LONG-TERM GOALS: by 7/22/24   1. Patient returns to prior level of community walking.  NOT MET, ELIZABETH OR SON PICKING UP GROCERIES AT THIS TIME, PICKED UP AND DROPPED OFF TOO.    2. Patient scores at least 23/30 on FGA for safe mobility without assistive device.  MET.  3. Patient able to safely participate in walking on the beach during her family vacation in early August.     PLAN OF CARE:  Patient will benefit from physical therapy 2 times per week for 1 month  Neuromuscular re-education, therapeutic exercises, and therapeutic activities as outlined in grids.        "

## 2024-09-03 NOTE — PROGRESS NOTES
OCCUPATIONAL THERAPY PROGRESS NOTE #2:    09/03/2024  Sanam MORGAN Gloria  1955  [id number]  Dia Redman MD   Diagnosis ICD-10-CM Associated Orders   1. Cerebrovascular accident (CVA), unspecified mechanism (HCC)  I63.9             Assessment    Assessment details: See skilled analysis for further details.         Skilled Analysis:  Pt is a 68 y.o. female referred to Occupational Therapy s/p Cerebrovascular accident (CVA), unspecified mechanism (HCC) [I63.9].   Pt participated in skilled OT evaluation and following formalized testing as well as clinical observation, Pt presents with the following areas of deficit:  LUE ataxia affecting gross motor and fine motor coordination, bilateral coordination/integration of LUE, hand to target precision with dysmetria evident, and difficulties with performing IADLs.  Pt also presented with impaired LUE proprioception/kinesthetic awareness and decreased speed and accuracy with FMC/prehension/in-hand manipulation abilities.  Pt will benefit from skilled Occupational Therapy services 2x/week for 6-8 weeks with focus on neuro re-ed, thera act, thera ex, self-care management to improve on the above deficits, improve functional use of LUE, and enhance overall QOL/independence.     Upon this date (09/03/2024), Pt participated in re-evaluation to further assess fxnl progression towards Occupational Therapy goals. Pt demo with fair + functional progression towards goals in POC evident by increased L distal strength/endurance, increased speed and accuracy of L functional opposition,  improved speed and accuracy of L FMC/prehension/in-hand manipulation, improved bilateral coordination with less LUE motor delays presented, improved LUE proprioception without dysmetria presented, and significant improved abilities with simple meal prepping and making sandwiches with increased PSFS level to 10/10.  Following formalized testing and clinical observation, Pt continues to present with the  following limitation: LUE slight ataxia affecting gross motor coordination/hand to target precision, bilateral coordination/integration of LUE, and decreased L FMC/refined prehension/in-hand manipulation of L hand.  OTR recommending Pt to continue skilled OT services 1x/week for 4-6 more weeks focusing on improving the above deficits, increased functional use of LUE, and enhance overall QOL.        Short Term Goals:  Pt will increase proprioception of LUE hand to target for improved functional reach vision occluded with ADL/IADL tasks  x 50% accuracy 4 weeks-  MET  Pt will increase LUE rate of manipulation for all FM tests x 10 seconds for improved functional performance/independence with salient tasks/IADLs 4 weeks-- MET  Pt will demo with decreased LUE ataxia with functional reach for normalized movement pattern of  L UE and for improved hand to target accuracy x 50% 4 weeks--  MET  Pt will increase L UE  strength to #48 through the use of strengthening exercises and home program for eventual return to completion of simple meal prepping with increased independence 4 weeks-- MET  Pt will demo with improved bilateral coordination for completion of simple meal prepping activities for an increased PSFS level to 9/10 4 weeks-- MET      Long Term Goals:  Pt will increase proprioception of LUE hand to target for improved functional reach vision occluded with ADL/IADL tasks  x 85% accuracy--  MET   Pt will increase LUE rate of manipulation for all FM tests x 15 seconds for improved functional performance/independence with salient tasks/IADLs-- MET   Pt will demo with decreased LUE ataxia with functional reach for normalized movement pattern of  L UE and for improved hand to target accuracy x 85%--PARTIALLY MET   Pt will increase L UE  strength to #50 through the use of strengthening exercises and home program for eventual return to completion of simple meal prepping with increased independence-- MET  Pt will demo  "with improved bilateral coordination for completion of simple meal prepping activities for an increased PSFS level to 10/10--  MET      Subjective Evaluation    Quality of life: good          SUBJECTIVE: \"My arm feels stiff, but I am not noticing anymore difficulties.\"    Pt has upcoming vascular surgery on 10/16/2024.    PATIENT GOAL: \"I want to be able to be more active again.  Driving and all.\"    Patient-Specific Functional Scale   Task is scored 0 (unable to perform activity) to 10 (ability to perform activity independently)    Activity Date: 05/30/24 Date: 09/03/24   Improved abilities making a sandwich 8/10 10/10   2.   Improved coordination of LUE 5/10 7-8/10       HISTORY OF PRESENT ILLNESS:     Pt is a 68 y.o. female who was referred to Occupational Therapy s/p  Cerebrovascular accident (CVA), unspecified mechanism (HCC) [I63.9]. As per hospital reports, Pt presented to the emergency room on 05/04/2024 with a chief complaint of gait instability and discoordination for 24 hours.  Pt had initially had seen her dentist due to right lower jaw pain and then trialed NSAIDs.  Pt reported later in the day that she was having difficulty with walking in her living room.  Pt fell without loss of consciousness or head strike.  Pt was able to ambulate, but felt like she was \"intoxicated\".  Her symptoms improved however she was having difficulty with operating her cellular device.  On arrival to the emergency room there was concern for possible left facial droop she was out of the window for TNK and loaded with Plavix and aspirin.   Urgent neurologic consultation was obtained and a head CT showed an age indeterminant right thalamic infarct as well as a 2.5 cm extra-axial densely calcified mass with associated hyperostosis lateral to the left frontal lobe consistent with previously known large meningioma.   CTA showed no large vessel occlusion although she did have evidence of left ICA flow-limiting critical stenosis.   " Pt was evaluated in consultation by the neurology service and underwent MRI which confirmed right thalamic stroke.  Given critical left ICA stenosis he was evaluated by vascular surgery with plans for outpatient carotid endarterectomy.  In terms of her meningioma, the case was discussed with neurosurgery who agreed to see the Pt as an outpatient and felt there was no urgent neurosurgical needs.   Pt was discharged to home environment with plans to remain on dual antiplatelet therapy for 21 days followed by monotherapy with aspirin.  Pt was evaluated by physical therapy and Occupational Therapy and did not require inpatient rehabilitation.  Pt was recommended for further OP OT/PT services.  Pt had a f/u appt with neurosurgery 2* large meningioma with recommendations to return in one year for MRI.      Pt with self-report of continuing to notice lack of gross motor coordination with LUE affecting bilateral coordination, functional use of LUE, and hand to target precision.  Pt with self-report of noticing occasionally knocking over items.    Pt lives in a Senior Appt independently with frequent visits from Dtr in Law.  Pt does have elevator access.  Pt currently performing all ADLs at Mod I status and occasional Supervision for bathing routines.  Pt currently requires assistance for IADL completion at this time 2* safety reasons.  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  Pt loss of  role since CVA-- Pt's Dtr in Law is main transportation at this time.         PMH:   Past Medical History:   Diagnosis Date    History of ovarian cyst     Melanoma of shoulder, right (HCC)          Pain Levels:     Restin    With Activity:  60/10     Pain resolved.  Occasional muscle soreness reported.    Objective     Functional Assessment        Comments  See impairment section for further details.       Impairment Observations:        IE RUE IE LUE  Comments                 UPPER EXTREMITY FXN Intact Impaired  Pt is R hand  dominant                            /Pinch Strength           Dynamometer       - Gross Grasp 60 lbs 50 lbs  R: increased x 5 lbs.   L: increased x 5 lbs.    Pinch Meter        - PINCER 13.5 lbs 10 lbs  R: maintained  L: maintained    - TRIPOD 15 lbs 13.5 lbs  R: maintained  L: maintained    - LATERAL 15 lbs  16 lbs  R: maintained  L: maintained               AROM (seated)        Elevation WFL WFL     Shoulder FF WFL  WFL      Shoulder Ext WFL  WFL      Shoulder Abd WFL  WFL      Shoulder Add WFL  WFL      Horizontal Abd WFL  WFL      Horizontal Add WFL  WFL      Elbow Flex WFL  WFL      Elbow Ext WFL  WFL      Pronation WFL  WFL      Supination WFL  WFL      Wrist Flex WFL  WFL      Wrist Ext WFL  WFL      Digit Flex WFL  WFL      Digit Ex WFL  WFL      Composite Grasp WFL  WFL      Hook Grasp WFL  WFL      Opposition Intact  Resolved; significant improved speed and accuracy evident     Dysdiadochokinesia Significant improved bilateral coordination evident Significant improved speed and coordination of LUE presented improving overall bilateral coordination                          MMT           Shoulder FF 5/5 5/5     Shoulder Ext 5/5 5/5     Shoulder Abd 5/5 5/5     Shoulder ADd 5/5 5/5     Elbow Flex 5/5 5/5     Elbow Ext 5/5 5/5     Wrist Flex 5/5 5/5     Wrist Ext 5/5 5/5     SENSATION       Myofilaments (2.83 WNL) 2.83 2.83     Sharp Dull  Intact Intact     Proprioception Intact Intact  Significant improved LUE proprioception without dysmetria presented   Hot/Cold Temp Intact Intact            COORDINATION       9 Hole Peg Test 23.14 seconds 47 seconds with x 1 droppage  L: able to complete test x 8 seconds faster   Fxnl Dexterity Test 40.09 seconds 1 minute and 3 seconds x 2 droppage  L: able to complete test x 11 seconds faster   MODIFIED MELISSA SCALE (TONE)       No increase in muscle tone (0) 0 0     Slight Increase in muscle tone with catch and release or min resist at end range (1)       Slight  Increase in muscle tone with catch and release, followed by min resistance through remainder of range (1+)       Increased muscle tone through full range, able to be moved easily (2)       Considerable increase in tone, difficult to move (3)       Rigid in Flexion/Extension (4)                                     Upper Extremity Function (Fine motor, ADL):    Are you able to turn a key in a lock? Without any difficulty  Are you able to brush your teeth? Without any difficulty  Are you able to make a phone call using a touch tone or key-pad? Without any difficulty  Are you able to  coins from a table top? Without any difficulty  Are you are able to write with a pen or pencil? Without any difficulty  Are you able to open and close a zipper? Without any difficulty  Are you able to wash and dry your body? Without any difficulty  Are you able to shampoo your hair? Without any difficulty  Are you able to open previously opened jars? Without any difficulty  Are you able to hold a plate full of food? Without any difficulty  Are you able to pull on trousers? Without any difficulty  Are you able to button your shirt? Without any difficulty  Are you able to trim your fingernails? Without any difficulty  Are you able to cut your toe nails? Get them done by salon  Are you able to bend down and  clothing from the floor? Without any difficulty  How much DIFFICULTY do you currently have using a spoon to eat a meal? No difficulty  How much DIFFICULTY do you currently have putting on a pullover shirt? No difficulty  How much DIFFICULTY do you currently have taking off a pullover shirt? No difficulty  How much DIFFICULTY do you currently have removing wrappings from small objects? No difficulty  How much DIFFUCULTY do you currently have opening medications or vitamin containers (e.g. childproof containers, small bottles)? No difficulty      OTHER PLANNED THERAPY INTERVENTIONS:   Supine, seated, and in stance neuro  re-ed  FMC/prehension/in-hand manipulation  Bilateral integrative task  Timed Trials  Proprioceptive training of LUE  Hand to target  Seated functional reach: crossing midline  WBearing strategies   Closed chain activities    INTERVENTION COMMENTS:  Diagnosis: Cerebrovascular accident (CVA), unspecified mechanism (HCC) [I63.9]  Precautions: fall risk  Insurance: Payor: [employer]  REP / Plan: [employer] / Product Type: Medicare HMO /   9 of 10 visits, PN due 10/03/2024    [id number]  [id number] unsupervised  [id number] GRP  [id number] 1:1

## 2024-09-05 ENCOUNTER — APPOINTMENT (OUTPATIENT)
Dept: OCCUPATIONAL THERAPY | Facility: REHABILITATION | Age: 69
End: 2024-09-05
Payer: COMMERCIAL

## 2024-09-05 ENCOUNTER — OFFICE VISIT (OUTPATIENT)
Facility: REHABILITATION | Age: 69
End: 2024-09-05
Payer: COMMERCIAL

## 2024-09-05 DIAGNOSIS — I63.9 CEREBROVASCULAR ACCIDENT (CVA), UNSPECIFIED MECHANISM (HCC): Primary | ICD-10-CM

## 2024-09-05 PROCEDURE — 97530 THERAPEUTIC ACTIVITIES: CPT | Performed by: PHYSICAL THERAPIST

## 2024-09-05 PROCEDURE — 97110 THERAPEUTIC EXERCISES: CPT | Performed by: PHYSICAL THERAPIST

## 2024-09-05 NOTE — PROGRESS NOTES
"PHYSICAL THERAPY TREATMENT / DISCHARGE    Date: 24  Name: Sanam Castro  : 1955  Referring Provider: Dia Redman MD  AUTHORIZATION:   Insurance: Payor: ORLIN  REP / Plan: [facility] MEDICARE ADVANTRA / Product Type: Medicare HMO /     PATIENT HISTORY:  HPI: Sanam Castro is a 68 y.o. female referred to outpatient physical therapy for the following diagnosis   Encounter Diagnosis   Name Primary?    Cerebrovascular accident (CVA), unspecified mechanism (HCC) Yes       Will go grocery shopping this weekend with daughter-in-law.    Babysat the other day.    Patient-Specific Functional Scale   Task is scored 0 (unable to perform activity) to 10 (ability to perform activity independently)  Activity     Get back to being active.     2.   Not being reliant on the walker.     3.   Return to driving.           OBJECTIVE:  Precautions - fall risk    24  Continued working on dynamic and reactive balance, walking on uneven surface and obstacle negotiation.  Retest mobility measures upon follow-up.      Specialty Treatment Diary  24   Home exercise program          (Ther-ex, neuromuscular re-ed)Walking/endurance          (Neuromuscular re-ed) Static and dynamic balance/anticipatory / reactive 6 minute walk test    Sit to stand   18\" x 5   16.5\" x 5 x 3 sets    Standing heel raise about 10 - 15 each side    Walking passing soccer ball 3 min  Small range movements batting ball with tennis racquet  In solostep  3 min    Up and back 8\" step  45 sec x 3sets    Blaze pods  In solostep  8\" step x 2 sets  1 min x 3 sets  Then foam over foam blocks  1 min x 3 sets   Overground walking timed 100 feet as fast as comfortable  About 1000 feet    Sit to stand 18\" chair   5 x as fast as possible  Best 16 sec  X 3 sets  16\" step 5 x 2 sets    Up and back 8\" step  45 sec    Blaze pods  In solostep  8\" step  Foam incline  1 min x 3 sets  Then foam incline solely  1 min " "x 3 sets    Getting to foam incline tossing bean bags at basketball hoop  3 min    Walking passing soccer ball 2 min  Small range movements batting ball with tennis racquet  In [facility]  3 min     Testing above    5 times sit to stand   18\" chair 5 x 2 sets  + carry 9 lbs 5  16.5\" chair 5 x 2 sets     Step up 8\" back and forth, 1.5 min    Blaze pod reactive balance   Foam incline  Foam under 2\" mat   1 min x 5       Blaze pods in [facility], 4 targets with mat with foam pads randomly underneath, with 2 6\" hurdles on top of mat  1 min x 3 forward facing  8 targets  7 targets  8 targets    1 min x 3 laterally  6 targets  6 targets  6 targets    Blaze pods in [facility], 4 targets with mat with foam pads randomly underneath, with 2 10\" hurdles, foam incline  1 min x 3 forward facing  6 targets  7 targets  6 targets    Blaze pods in [facility], 4 targets with steps, foam pad, and 2 10\" hurdles  1 min x 3 forward facing  7 targets  6 targets  7 targets    1 min x 3 laterally  6 targets  7 targets  7 targets    Tennis volley with 8\" ball 2 x 3 min. Outcome measures    Overground walking as fast as possible 100 feet x 4 sets  Best 19.6 seconds    Overground 2\" foam over 2x8\" foam blocks  16' line in [facility]  1 min x 3 sets   Best 12 7/10 RPS    Similar in [facility]  Tapping 6 targets knee to hip high with 9 lb water weight  1 min x 3 sets   Overground walking,       Resisted walking #10 in [facility]      Blaze pods in [facility], 6 targets with mat with foam pads randomly underneath, with 3 circles as targets to walk around   1 min. x 3   8 targets  10 targets  14 targets  RPE 15 when unable to step off the mat      Blaze pods, 6 targets  With foam incline, 2 air ex foam pads, 6\" marc  1 min. x 3 sets  6 targets  11 targets  RPE 13       Blaze pods with obstacles mixed obstacles, 2 6\" & 3 12\"   6 targets  1 min. X 3  8 targets  9 targets  RPE 15     Same as above, must go laterally  1 min. X 1  7 targets     Tennis volley with 8\" " "ball x 5 min.     Overground walking in [facility], 50' laps   x1  11 sec.    [facility] laps  x1  21.5 sec.    Resisted walking #20 in [employer], 100' laps  x1  24 sec.    *decreased to #10 based on reported LBP*  Resisted walking #10 in [employer], 100' laps  x4  22.9 sec.  22.5 sec.  21.5 sec.  21.9 sec.     Resisted walking #10 in [facility], 200' lap  (100' x 2 laps)  x1  46.3 sec.    Rest    [facility], 6 targets with mat with foam pads randomly underneath  1 min. x 1 set  RPE 12    Same as above,  - Added 3 circles as targets to walk around for challenge  1 min. x 2 sets  RPE 15 after 1st round w/ targets  RPE 17 after 2nd round w/ targets    Rest    Blaze pods, 6 targets  With foam incline, 1 air ex foam pad, 1 6\" marc, 1 small 6\" foam balance beam pad  1 min. x 3 sets  RPE 17 after 2nd and 3rd round    (Ther-ex) Strengthening          (There-ex) Stretching                        Mobility Measures 8/27/24 7/25/24 6/25/24 6/20/24 6/12/24 5/22/24   5 Time Sit to Stand  (17\" chair, arms across chest) 17.0 sec  Retested 9/05/24  13.2 sec 12.6 seconds 16.3 seconds 20.1 seconds 18.2 seconds 14.7 seconds   3 Meter Timed  Up & Go 8.5 sec 6.9 seconds   7.4 seconds 8.9 seconds   Walking speed (self-selected)         Functional Gait Assessment (see below) 27/30 27/30 22/30 18/30 19/30 12/30   6 Minute Walk Test (100 foot circular course) 1120 feet    Retested 9/05/24  1300 feet again no assistive device 1230 feet  No falls   No assistive device     Completed without assistive device with close supervision only  1160 feet no falls no foot scuffing Completed without assistive device in [facility]    1116 feet no falls or loss of balance, mild foot scuffing 5 minutes into walking  87 bpm 97% SpO2      1300 feet with rolling walker    Ending heart rate 90s bpm   Patient-Reported Outcome Measure: Activities-Specific Balance Confidence Scale (ABC Scale)       4 square step test  11.0 sec  10.0 " "sec   89%  49%     Functional Gait Assessment  3/3 Gait level surface  3/3 Change in gait speed  3/3 Gait with horizontal head turns  2/3 Gait with vertical head turns  3/3 Gait and pivot turn  3/3 Step over obstacle  3/3 Gait with narrow base of support  3/3 Gait with eyes closed  2/3 Ambulating backwards  2/3 Steps  27/30 Total score (less than 22/30 indicates increased risk of fall)..      ASSESSMENT:  Jaimie is a 68 year old female with mobility limitations following CVA 5/04/24.      9/05/24  Good improvement in 6 minute walk test.    Discussed driving with Ruby Castro, OTR/L, will discuss following surgery.  Reviewed home program - sit to stand low surface, standing heel raise single leg, walk for exercise.    Discharge from physical therapy.    9/03/24  Practice sit to stand at home.  Performed well with dynamic balance tasks today.  Staying active at home, discharge from physical therapy 9/05/24.    8/27/24  Maintained performance in Functional Gait Assessment, some decline in other mobility measures above.  We will work for 1-2 weeks towards prior best level of performance here.  We will work towards incorporating more strengthening exercises in a long-term exercise program.  Retest mobility measures in one to two weeks.      7/30/24  Jaimie was able to maintain good dynamic and reactive balance today. Uneven surface negotiation was continued with the addition of hurdles to clear. 4\" hurdles were cleared without difficulty both going forward and laterally. The most challenging obstacle for her to negotiate was the 10\" hurdles. Stair negotiation was incorporated today and tolerated well. She used bilateral handrails due to fear of falling. She will continue to benefit from skilled physical therapy.    7/25/24  Good improvement in mobility measures, especially dynamic balance.  Safe for ambulation without assistive device.  Continue to work on endurance and \"balance endurance\" to maintain good dynamic " "balance with extended household and community ambuation.      7/23/24  Improvement in navigating uneven surfaces, negotiating obstacles, and directional changes with movement with less LOB, especially in posterior direction. Walking around obstacles/ targets on uneven targets was the most challenging task for her today. Continued improvement with reactive balance including blaze pod activities and ability to maintain tennis volley while moving in all directions and switching between james and backhand today, making progress to return to playing tennis. Continued difficulty moving from one uneven surface to another and navigating obstacles on uneven surfaces. Increased width of the space between air ex foam pad and incline mat to allow for a step between the two during blaze pod course today due to patient reported difficulty (RPE 17 before pad was moved further apart from foam incline). Will continue working on gait speed and walking with directional changes, uneven surfaces, and obstacles. Cleared all obstacles including 12\". Add treadmill and tasks involving narrow base of support next session. Will add stair training in future sessions to improve functional mobility and achieve higher FGA score in stair negotiation.    7/18/24  Continuing to progress with increasing gait speed, endurance, dynamic and reactive balance, disc grabs on periphery of vision, and walking on uneven surfaces. Some difficulty grabbing discs with L hand. Difficulty walking on uneven surfaces, especially with targets to avoid with blaze pods and switching from level surfaces to uneven surfaces. Will add 12\" obstacles to blaze pod course next time. Will continue working on walking with narrow base of support on uneven surfaces with obstacles. Will add treadmill and incorporate dual tasks next session.     7/15/24  Continued difficulty navigating obstacles with uneven surfaces. Modified obstacle course to allow for a space between 12\" " "obstacles and air ex foam pads. Will continue to work on dynamic and reactive balance, walking with narrow base of support. Will do treadmill and incorporate dual tasks next session, per previous session notes.     7/10/24   Had a high degree of difficulty with performance of foam beams. Did much better hurdles today per patient, able to traverse the 12\"  hurdles without much loss of balance. Able to use a mix of hip and stepping strategy to correct his balance. Used SOLO throughout session for fall prevention. Had some difficulty with dual tasking, would benefit from this being incorporated throughout treatment plans. Would probably try treadmill again as patient has made some great progress and would tolerate this better. Appropriate and challenging RPE throughout session.     7/08/24  Continuing to progress with dynamic and reactive balance, walking on inclines and uneven surfaces, and walking over obstacles. Demonstrates improvement in correcting episodes of LOB while navigating uneven surfaces and dynamic and reactive balance including blaze pod targets and hitting a small 8\"ball with a tennis racquet back and forth at faster speeds. Will continue to work on balance and walking on uneven surfaces to work towards patient goals of going to the beach with her family and returning to previous level of function.     7/03/24  Similar to yesterday. Continues to progress with reactive balance and cardiovascular endurance. Worked on cardiovascular endurance activities for longer periods of time compared to previous sessions including tennis and soccer. Demonstrating improvement in L sided functional mobility during activities such as hitting backhands (one-handed, using R hand) in tennis and kicking a soccer ball with L foot but needs continued improvement with coordinating movements. Continue to challenge reactive and dynamic balance, cardiovascular endurance, and gait speed for safe ambulation. Will continue to " "encourage use of L sided functional activities to return to previous level of function including playing tennis and pickleball.     7/02/24  Jaimie continues to progress with reactive balance and exercise tolerance. Tolerated longer durations of tennis and soccer today. Kicked soccer ball with L foot with more accuracy more consistently today compared to previous. Will continue challenging balance and working on uneven surfaces with directional changes to work towards goals of being able to walk on the beach with family in August.    6/27/24  Jaimie is a 68 year old female with mobility limitations following CVA 5/04/24.      Jaimie shows improvement in reactive balance and gait. Able to complete activities with uneven surfaces with less supervision. Assessed L shoulder due to upcoming MRI. Reports L shoulder pain post-CVA with UE movement including overhead movements, full range flexion, full range ABD and ther ex including banded Shoulder ER \"no moneys\" with yellow theraband and scapular rows with red theraband. Demonstrated difficulty with keeping L elbow at side due to L shoulder pain during rows and difficulty ER L shoulder with banded exercise. 4/5 L SH ABD strength. Will continue to evaluate next visit. Continues to require physical therapy to return to previous level of function.    PLAN:  Continue to progress balance and endurance activities as tolerated.    SHORT-TERM GOALS: by 6/05/24, continued to 7/08/24  1. Patient scores at least 16/30 on FGA.  MET.  2. Patient walks at least 1500 feet during 6 minute walk test. NOT MET BUT PROGRESSED.  3. Patient safely walks within the home with a cane.  MET.    LONG-TERM GOALS: by 7/22/24   1. Patient returns to prior level of community walking.  RETURNING TO GROCERY SHOPPING THIS WEEK.   2. Patient scores at least 23/30 on FGA for safe mobility without assistive device.  MET.  3. Patient able to safely participate in walking on the beach during her family vacation in " early August. MET.    PLAN OF CARE:  Discharge from physical therapy.

## 2024-09-09 ENCOUNTER — PREP FOR PROCEDURE (OUTPATIENT)
Dept: VASCULAR SURGERY | Facility: CLINIC | Age: 69
End: 2024-09-09

## 2024-09-10 ENCOUNTER — OFFICE VISIT (OUTPATIENT)
Dept: OCCUPATIONAL THERAPY | Facility: REHABILITATION | Age: 69
End: 2024-09-10
Payer: COMMERCIAL

## 2024-09-10 DIAGNOSIS — I63.9 CEREBROVASCULAR ACCIDENT (CVA), UNSPECIFIED MECHANISM (HCC): Primary | ICD-10-CM

## 2024-09-10 PROCEDURE — 97530 THERAPEUTIC ACTIVITIES: CPT | Performed by: OCCUPATIONAL THERAPIST

## 2024-09-10 PROCEDURE — 97110 THERAPEUTIC EXERCISES: CPT | Performed by: OCCUPATIONAL THERAPIST

## 2024-09-10 NOTE — PROGRESS NOTES
"Daily Note     Today's date: 9/10/2024  Patient name: Sanam Castro  : 1955  MRN: [id number]  Referring provider: Dia Redman MD  Dx:   Encounter Diagnosis     ICD-10-CM    1. Cerebrovascular accident (CVA), unspecified mechanism (HCC)  I63.9                      Subjective: \"I feel a little tired.  I feel I laid around too much yesterday.\"      Objective: See treatment description below      Thera Ex: UBE x 5 minutes prograde and x 5 minutes retrograde in seated position bilaterally without resistance to improve LUE gross motor coordination/control, bilateral coordination, and improved fluidity of LUE movements.     Thera Act: Pt advanced to performing x 3 timed trials of [place] Dexterity Test with demands of flipping  pieces over unilaterally with LUE only to challenge and improve L in-hand manipulation speed and accuracy.    Trial #1: 2 minutes and 38 seconds  Trial #2: 2 minutes and 22 seconds  Trial #3: 2 minutes and 38 seconds    Pt advanced to removal of Velcro checkers from Velcro board, followed by retrieval of foam blocks for placement in central portion of each Velcro square utilizing L 3-jaw juancarlos grasp to green resistive clothes pin to improve L FMC/prehension/in-hand manipulation, improved L intrinsic/distal strength, and improved hand to target precision. Pt concluded activity with performing removal of each foam block utilizing finger to palm translation.      Assessment: Tolerated treatment well. Patient would benefit from continued OT    Pt demo with G activity tolerance to UBE demands with abilities to complete x 10 minutes without resistance with mild fatigue reported and with G pacing/bilateral coordination evident throughout.  Pt demo with initial Max difficulties coordinating LUE distally for flipping of checkers, though significant improvement evident as activity persisted with improve both speed and accuracy.   Pt demo with excellent sustained 3-jaw juancarlos grasp to " green resistive clothes pin with 0% droppage evident throughout.  Pt did require increased time to translate foam blocks from fingers to palm to improve digit coordination.     Plan: Continue per plan of care.          INTERVENTION COMMENTS:  Diagnosis: Cerebrovascular accident (CVA), unspecified mechanism (HCC) [I63.9]  Precautions: fall risk  Insurance: Payor: ORLIN  REP / Plan: [employer]LIN MEDICARE ADVANTRA / Product Type: Medicare HMO /   1 of 10 visits, PN due 10/03/2024

## 2024-09-11 ENCOUNTER — APPOINTMENT (OUTPATIENT)
Dept: OCCUPATIONAL THERAPY | Facility: REHABILITATION | Age: 69
End: 2024-09-11
Payer: COMMERCIAL

## 2024-09-11 ENCOUNTER — APPOINTMENT (OUTPATIENT)
Facility: REHABILITATION | Age: 69
End: 2024-09-11
Payer: COMMERCIAL

## 2024-09-17 ENCOUNTER — OFFICE VISIT (OUTPATIENT)
Dept: OCCUPATIONAL THERAPY | Facility: REHABILITATION | Age: 69
End: 2024-09-17
Payer: COMMERCIAL

## 2024-09-17 ENCOUNTER — APPOINTMENT (OUTPATIENT)
Dept: OCCUPATIONAL THERAPY | Facility: REHABILITATION | Age: 69
End: 2024-09-17
Payer: COMMERCIAL

## 2024-09-17 ENCOUNTER — APPOINTMENT (OUTPATIENT)
Facility: REHABILITATION | Age: 69
End: 2024-09-17
Payer: COMMERCIAL

## 2024-09-17 DIAGNOSIS — I63.9 CEREBROVASCULAR ACCIDENT (CVA), UNSPECIFIED MECHANISM (HCC): Primary | ICD-10-CM

## 2024-09-17 PROCEDURE — 97110 THERAPEUTIC EXERCISES: CPT | Performed by: OCCUPATIONAL THERAPIST

## 2024-09-17 PROCEDURE — 97530 THERAPEUTIC ACTIVITIES: CPT | Performed by: OCCUPATIONAL THERAPIST

## 2024-09-17 NOTE — PROGRESS NOTES
"Daily Note     Today's date: 2024  Patient name: Sanam Castro  : 1955  MRN: [id number]  Referring provider: Dia Redman MD  Dx:   Encounter Diagnosis     ICD-10-CM    1. Cerebrovascular accident (CVA), unspecified mechanism (HCC)  I63.9                      Subjective: \"I feel like the coordination is better.\"       Objective: See treatment description below      Thera Ex: UBE x 5 minutes prograde and x 5 minutes retrograde in seated position bilaterally without resistance to improve LUE gross motor coordination/control, bilateral coordination, and improved fluidity of LUE movements.     Thera Act: Pt advanced to performing seated functional reach with midline crossing for wooden marble retrieval for placement in corresponding colored cupcake container utilizing 3-jaw juancarlos grasp to green resistive clothes pin, followed by wooden marble retrieval and placement in wooden board utilizing pincer grasp on wooden tongs focusing on improved L FMC/prehension, improved hand to target precision, and improved LUE proximal/distal teaming.  Pt concluded activity with removal of each wooden marble from wooden board with in-hand manipulation demands of finger to palm translation to challenge L in-hand manipulation speed and accuracy.     Pt concluded tx session with performing three timed 1-minute trials of rotating x 2 golf balls in L hand to challenge in-hand manipulation speed and accuracy.     Assessment: Tolerated treatment well. Patient would benefit from continued OT    Pt demo with G activity tolerance to UBE demands with abilities to complete x 10 minutes without resistance with mild fatigue reported and with G pacing/bilateral coordination evident throughout.  Pt demo with excellent abilities sustaining 3-jaw juancarlos pinch to green resistive clothes pin with retrieval of each wooden marble with 0% droppage.  Pt did report Max fatigue in L hand/digits upon conclusion.  Pt demo with increased " difficulties retrieving wooden marbles utilizing pincer grasp to wooden tongs with increased droppage x 5% with difficulties grading pressure applied to tongs.  Pt also demo with increased difficulties translating wooden marbles from finger to palm with increased rate of droppage x 10%.  Despite increased rate of droppage, Pt did perform in-hand manipulation demands at quicker pace.  Pt demo with improved coordination/manipulation of rotating x 2 golf balls unilaterally in L hand at bradykinetic pace, though increased rate od droppage evident when challenging herself by increasing speed.    Plan: Continue per plan of care.        INTERVENTION COMMENTS:  Diagnosis: Cerebrovascular accident (CVA), unspecified mechanism (HCC) [I63.9]  Precautions: fall risk  Insurance: Payor: ORLIN ANDRE REP / Plan: [employer] MEDICARE ADVANTRA / Product Type: Medicare HMO /   12of 10 visits, PN due 10/03/2024

## 2024-09-23 ENCOUNTER — APPOINTMENT (OUTPATIENT)
Facility: REHABILITATION | Age: 69
End: 2024-09-23
Payer: COMMERCIAL

## 2024-09-23 ENCOUNTER — OFFICE VISIT (OUTPATIENT)
Dept: OCCUPATIONAL THERAPY | Facility: REHABILITATION | Age: 69
End: 2024-09-23
Payer: COMMERCIAL

## 2024-09-23 DIAGNOSIS — I63.9 CEREBROVASCULAR ACCIDENT (CVA), UNSPECIFIED MECHANISM (HCC): Primary | ICD-10-CM

## 2024-09-23 PROCEDURE — 97110 THERAPEUTIC EXERCISES: CPT | Performed by: OCCUPATIONAL THERAPIST

## 2024-09-23 PROCEDURE — 97530 THERAPEUTIC ACTIVITIES: CPT | Performed by: OCCUPATIONAL THERAPIST

## 2024-09-23 NOTE — PROGRESS NOTES
"Daily Note     Today's date: 2024  Patient name: Sanam Castro  : 1955  MRN: [id number]  Referring provider: Dia Redman MD  Dx:   Encounter Diagnosis     ICD-10-CM    1. Cerebrovascular accident (CVA), unspecified mechanism (HCC)  I63.9                      Subjective: \"I feel like the coordination is better.\"       Objective: See treatment description below      Thera Ex: UBE x 5 minutes prograde and x 5 minutes retrograde in seated position bilaterally without resistance to improve LUE gross motor coordination/control, bilateral coordination, and improved fluidity of LUE movements.     Thera Act: Pt advanced to performing peg retrieval with in-hand manipulation demands of palm to finger translation for placement of peg in peg board, followed by completing bilateral integrative task of stringing flaccid string through holes placed in each peg focusing on improved speed and accuracy of in-hand manipulation demands of RUE, improved R refined prehension, and improved bilateral coordination/bimanual dexterity.    Pt concluded tx session with performing target taps to BITS screen unilaterally with LUE only and with added #2 wrist weight for 3 minute timed trials focusing on improved hand to target precision, improved LUE proximal strength/muscle endurance, and enhanced overall gross motor control. Pt completed x 3 trials:    Trial #1 (Added #2 wrist weight): 74.6% accuracy; 3:00 time to complete; 3.79 second reaction time; 47 accurate hits  Trial #2 (Added #2 wrist weight): 68.48% accuracy; 3:00 time to complete; 2.84 second reaction time; 63 accurate hits  Trial #3 (no weight donned): 73.96% accuracy; 3:00 time to complete; 2.50 second reaction time; 71 accurate hits    Assessment: Tolerated treatment well. Patient would benefit from continued OT    Pt demo with G activity tolerance to UBE demands with abilities to complete x 10 minutes without resistance with Min fatigue reported and with G " pacing/bilateral coordination evident throughout.  Pt demo with improved speed and accuracy of in-hand manipulation demands without challenges evident on this date.  Pt did present with slight dysmetria when stringing flaccid string through holes in pegs with LUE.  Pt demo with decreased accuracy each trial 2* heightened ataxia with heightened fatigue levels affecting hand to target precision.  Despite accuracy declining on each trial, Pt did present with increased reaction time with abilities to complete more accurate hits each trial.     Plan: Continue per plan of care.        INTERVENTION COMMENTS:  Diagnosis: Cerebrovascular accident (CVA), unspecified mechanism (HCC) [I63.9]  Precautions: fall risk  Insurance: Payor: ORLIN  REP / Plan: [facility] MEDICARE ADVANTRA / Product Type: Medicare HMO /   3 of 10 visits, PN due 10/03/2024

## 2024-09-24 ENCOUNTER — TELEPHONE (OUTPATIENT)
Age: 69
End: 2024-09-24

## 2024-09-24 NOTE — TELEPHONE ENCOUNTER
Received call from pt stating she is scheduled to have a ENDARTERECTOMY ARTERY CAROTID (Left: Neck) on 10/16. She saw her ENT doctor today and was dx with a sinus infection. She was prescribed doxycyline and prednisone. She is not sure how long she will be on the doxycyline for since she did not  the med from the pharmacy yet. Pt stated she will call back with that info once she has it.     Informed pt to make sure she finishes the course of the abx and will re-evaluate after to see how she is feeling since procedures are usually not performed if an active infection is going on. She agreed. Also informed pt will send a message to the provider to review and advise with any other recommendations.

## 2024-09-24 NOTE — TELEPHONE ENCOUNTER
Agree with recommendations provided. Can she also clarify how long her course of steroids is? I don't see anything in the chart currently. She should keep us updated on her symptoms.

## 2024-09-25 NOTE — TELEPHONE ENCOUNTER
Pt returned the call. She stated her prednisone is for 10 days and the doxycyline will be 100mg bid for 14 days. These medications were prescribed by her ENT doctor outside of the [facility]. Advised pt to call the office once finished with both prescriptions to update us on how she is feeling. She voiced understanding.

## 2024-09-30 ENCOUNTER — APPOINTMENT (OUTPATIENT)
Dept: OCCUPATIONAL THERAPY | Facility: REHABILITATION | Age: 69
End: 2024-09-30
Payer: COMMERCIAL

## 2024-10-14 ENCOUNTER — TELEPHONE (OUTPATIENT)
Age: 69
End: 2024-10-14

## 2024-10-14 NOTE — TELEPHONE ENCOUNTER
Caller: Jaimie    Doctor: [facility]     Reason for call: Patient had sinus infection last week. Was feeling better but now has a head cold and diarrhea. Spoke with Mellisa and she said she will reach out to patient about surgery.  Pre testing admin # [phone].      Call back#: [phone]

## 2024-10-24 ENCOUNTER — NURSE TRIAGE (OUTPATIENT)
Age: 69
End: 2024-10-24

## 2024-10-24 ENCOUNTER — TELEPHONE (OUTPATIENT)
Dept: VASCULAR SURGERY | Facility: CLINIC | Age: 69
End: 2024-10-24

## 2024-10-24 NOTE — TELEPHONE ENCOUNTER
Spoke with Gayathri Gonzalez PA-C at the patient's ENT clinic. Discussed with her from my standpoint we should have the chronic sinusitis and molar infection taken care of prior to carotid intervention. Fear is there could be infectious seeding of the patch which could lead to further complications, reoperation, and increase risk of stroke.    Will have patient follow up with me after her sinus surgery

## 2024-10-24 NOTE — TELEPHONE ENCOUNTER
Sherrie ESTEVEZ from ENT called asking to speak to Dr. Galloway. Per [employer] message Dr. Galloway wanted a number to call Sherrie.    Please call her at ph#[phone]

## 2024-11-18 NOTE — ED NOTES
Provider at bedside.     Heron Howard, IGOR  06/02/24 7907     [Restricted in physically strenuous activity but ambulatory and able to carry out work of a light or sedentary nature] : Status 1- Restricted in physically strenuous activity but ambulatory and able to carry out work of a light or sedentary nature, e.g., light house work, office work [Thin] : thin [Normal] : affect appropriate [de-identified] : no respiratory distress noted  [de-identified] : abdomen soft, NTND

## 2024-12-30 ENCOUNTER — TELEPHONE (OUTPATIENT)
Age: 69
End: 2024-12-30

## 2024-12-30 NOTE — TELEPHONE ENCOUNTER
24    Patient called office today to confirm her 25 appt was still active, due to a Text Message that she received and stated that it was .    Patient was concerned that appt was canceled.    Confirmed Appt to Patient and reassure her that her 25 is still active.     Patient UNDERSTOOD and Expressed her Thanks.      Any questions, please contact Patient.   Thank You.

## 2025-01-06 ENCOUNTER — TELEPHONE (OUTPATIENT)
Dept: NEUROLOGY | Facility: CLINIC | Age: 70
End: 2025-01-06

## 2025-01-06 NOTE — TELEPHONE ENCOUNTER
Pt called and states that she got a vm about an appt.  Confirmed appt with dr kirkpatrick on 1/8 at 9am.    Appt confirmed on appt desk

## 2025-01-06 NOTE — TELEPHONE ENCOUNTER
1/6/25 Lvm to conf appt with  on   1/8/25          .Please conf appt if pt calls back.Thank you in advance

## 2025-01-07 NOTE — TELEPHONE ENCOUNTER
Jannet (Daughter In-Law) calling to get step by step directions once she drops pt off at the door. Called the office. Benny stated to advise someone would be calling her back to provide that information.

## 2025-01-08 ENCOUNTER — TELEPHONE (OUTPATIENT)
Dept: NEUROLOGY | Facility: CLINIC | Age: 70
End: 2025-01-08

## 2025-01-08 NOTE — TELEPHONE ENCOUNTER
Pt called back in to r/s appt with Dr. Spain today. She is asking for Prachi to give her a call back please.     Thank you.

## 2025-01-08 NOTE — TELEPHONE ENCOUNTER
1/8/25 called to notify Grabiel Kiran will not be present in the office today due to a family  emergency, offered to assist with r/s she has to double checked with the person that provides transportation and will then call back to schedule a f/u appt, advised she could call back to [phone]

## 2025-02-05 ENCOUNTER — OFFICE VISIT (OUTPATIENT)
Dept: VASCULAR SURGERY | Facility: CLINIC | Age: 70
End: 2025-02-05
Payer: COMMERCIAL

## 2025-02-05 ENCOUNTER — TELEPHONE (OUTPATIENT)
Dept: VASCULAR SURGERY | Facility: CLINIC | Age: 70
End: 2025-02-05

## 2025-02-05 VITALS
SYSTOLIC BLOOD PRESSURE: 156 MMHG | DIASTOLIC BLOOD PRESSURE: 85 MMHG | WEIGHT: 179 LBS | HEART RATE: 67 BPM | OXYGEN SATURATION: 97 % | BODY MASS INDEX: 26.51 KG/M2 | HEIGHT: 69 IN

## 2025-02-05 DIAGNOSIS — I65.22 ASYMPTOMATIC STENOSIS OF LEFT CAROTID ARTERY: ICD-10-CM

## 2025-02-05 DIAGNOSIS — I65.22 CAROTID STENOSIS, ASYMPTOMATIC, LEFT: Primary | ICD-10-CM

## 2025-02-05 PROCEDURE — 99214 OFFICE O/P EST MOD 30 MIN: CPT | Performed by: SURGERY

## 2025-02-05 RX ORDER — CEFAZOLIN SODIUM 2 G/50ML
2000 SOLUTION INTRAVENOUS ONCE
OUTPATIENT
Start: 2025-02-05 | End: 2025-02-05

## 2025-02-05 RX ORDER — ACETAMINOPHEN 500 MG
500 TABLET ORAL EVERY 6 HOURS PRN
COMMUNITY

## 2025-02-05 RX ORDER — CHLORHEXIDINE GLUCONATE ORAL RINSE 1.2 MG/ML
15 SOLUTION DENTAL ONCE
OUTPATIENT
Start: 2025-02-05 | End: 2025-02-05

## 2025-02-05 NOTE — ASSESSMENT & PLAN NOTE
69-year-old female with a past medical history of hypertension, HLD, subarachnoid hemorrhage who was hospitalized in May 2024 for a right hemispheric stroke and known severe left carotid artery stenosis.  Patient was originally scheduled for a left carotid endarterectomy after her last visit in August however patient had a recurrent sinus infection as well as COVID and then required sinus surgery.  Patient has since recovered well from her sinus surgery and is interested in again rescheduling for surgery.  We discussed again the procedure, risks, and expectations with recovery.  Patient is agreeable in proceeding.  Electronic consent was signed in office

## 2025-02-05 NOTE — PROGRESS NOTES
"Name: Sanam Castro      : 1955      MRN: [id number]  Encounter Provider: Krish Galloway MD  Encounter Date: 2025   [facility]  :  Assessment & Plan  Asymptomatic stenosis of left carotid artery  69-year-old female with a past medical history of hypertension, HLD, subarachnoid hemorrhage who was hospitalized in May 2024 for a right hemispheric stroke and known severe left carotid artery stenosis.  Patient was originally scheduled for a left carotid endarterectomy after her last visit in August however patient had a recurrent sinus infection as well as COVID and then required sinus surgery.  Patient has since recovered well from her sinus surgery and is interested in again rescheduling for surgery.  We discussed again the procedure, risks, and expectations with recovery.  Patient is agreeable in proceeding.  Electronic consent was signed in office       Carotid stenosis, asymptomatic, left    Orders:  •  Case request operating room: LEFT CAROTID ENDARTERECTOMY; Standing  •  Basic metabolic panel; Future  •  CBC and Platelet; Future        History of Present Illness   HPI  Sanam Castro is a 69 y.o. female who presents     Pt here to re discuss CEA previously cancelled. Pt is asym.   History obtained from: patient    Review of Systems   Constitutional: Negative.    HENT: Negative.     Eyes: Negative.    Respiratory: Negative.     Cardiovascular: Negative.    Gastrointestinal: Negative.    Endocrine: Negative.    Genitourinary: Negative.    Musculoskeletal: Negative.    Skin: Negative.    Allergic/Immunologic: Negative.    Neurological: Negative.    Hematological: Negative.    Psychiatric/Behavioral: Negative.       Medical History Reviewed by provider this encounter:  Tobacco  Allergies  Meds  Problems  Med Hx  Surg Hx  Fam Hx     .     Objective   /85 (BP Location: Left arm, Patient Position: Sitting)   Pulse 67   Ht 5' 9\" (1.753 m) "   Wt 81.2 kg (179 lb)   SpO2 97%   BMI 26.43 kg/m²      Physical Exam  Vitals and nursing note reviewed.   Constitutional:       General: She is not in acute distress.     Appearance: She is well-developed.   HENT:      Head: Normocephalic and atraumatic.   Eyes:      Conjunctiva/sclera: Conjunctivae normal.   Cardiovascular:      Rate and Rhythm: Normal rate and regular rhythm.   Pulmonary:      Effort: Pulmonary effort is normal.      Breath sounds: Normal breath sounds.   Abdominal:      Palpations: Abdomen is soft.      Tenderness: There is no abdominal tenderness.   Musculoskeletal:      Cervical back: Neck supple.   Skin:     General: Skin is warm and dry.      Capillary Refill: Capillary refill takes less than 2 seconds.   Neurological:      Mental Status: She is alert.   Psychiatric:         Mood and Affect: Mood normal.         Administrative Statements   I have spent a total time of 35 minutes in caring for this patient on the day of the visit/encounter including Diagnostic results, Prognosis, Risks and benefits of tx options, Instructions for management, Patient and family education, Importance of tx compliance, Risk factor reductions, Impressions, Counseling / Coordination of care, Documenting in the medical record, Reviewing / ordering tests, medicine, procedures  , and Obtaining or reviewing history  .    Operative Scheduling Information:    Hospital:  [facility]    Physician:  [facility]    Surgery: left carotid endarterectomy    Urgency:  Standard    Level:  Level 4: Outpatients to be scheduled for screening procedures and elective surgery that can be delayed for longer than one month without reasonable expectation of detriment to patient.    Case Length:  3 hours    Post-op Bed:  ICU    OR Table:  Standard    Equipment Needs:  Vascular technologist and [employer] EEG monitoring    Medication Instructions:  Aspirin:   Continue (do not hold)    Hydration:  No    Contrast Allergy:  no

## 2025-02-05 NOTE — TELEPHONE ENCOUNTER
REMINDER: Under Reason For Call, comments MUST be formatted as:   (Surgeon's Initials) / (Procedure)      Special Instructions / FYI: consent signed electronically with Dr. Galloway. [facility]    Consent: I certify that patient has signed, printed, timed, and dated their surgery consent.  I certify that the patient's LEGAL NAME and DATE OF BIRTH are written in the upper left corner on BOTH sides of the consent.  I certify that BOTH sides of the completed surgery consent have been scanned into the patient's [facility] chart by Dr. Galloway on 2/5/2025.  Yes, I have LABELED the consent in Epic as Consent for Vascular Procedure.     For Surgical Clearances     Levels   1-3   ROUTE this encounter to [facility] Clearance Pool (AND)   [facility] Surgery Coordinator Pool     Level   4   ROUTE this encounter to [facility] Surgery Coordinator Pool       HYDRATION CLEARANCES   ONLY ROUTE TO  [facility] Clearance Pool       Yes, I have ROUTED this encounter to [facility] Surgery Coordinator and/or [facility] Clearance Pool.

## 2025-02-12 ENCOUNTER — PREP FOR PROCEDURE (OUTPATIENT)
Dept: VASCULAR SURGERY | Facility: CLINIC | Age: 70
End: 2025-02-12

## 2025-02-12 NOTE — TELEPHONE ENCOUNTER
Verified patient's insurance   CONFIRMED - Patient's insurance is [employer]  Is patient requesting a call when authorization has been obtained? Patient did not request a call.    Surgery Date: 3/19/25  Primary Surgeon: ROSSY // Krish Galloway (NPI: [id number])  Assisting Surgeon: Not Applicable (N/A)  Facility: [facility] (Tax: [id number] / NPI: [id number])  Inpatient / Outpatient: Inpatient  Level: 4    Clearance Received: No clearance ordered.  Consent Received: Yes, scanned into Epic on 2/5/25.  Medication Hold / Last Dose:  No hold on ASA  IR Notified: Not Applicable (N/A)  Rep. Notified:  [employer] notified 2/12/25   Equipment Needs: Not Applicable (N/A)  Vas Lab Requested: Not Applicable (N/A)  Patient Contacted: 2/10/25    Diagnosis: I65.22  Procedure/ CPT Code(s): (CEA) Carotid Endarterectomy / Patch Angioplasty // CPT: 78885    For varicose vein related procedures:   Last LEVDR: Not Applicable (N/A)  CEAP Classification: Not Applicable (N/A)  VCSS: Not Applicable (N/A)    Post Operative Date/ Time: 3/26/25 , 1030 [facility] with Akosua Sutherland (NPI: [id number])     *Please review medication hold(s), PATs, and check H&P with patient.*  PATIENT WAS MAILED SURGERY/SHOWERING/DISCHARGE/COVID INSTRUCTIONS AFTER REVIEWING WITH THEM VIA PHONE CALL.

## 2025-02-17 ENCOUNTER — ANESTHESIA EVENT (OUTPATIENT)
Dept: PERIOP | Facility: HOSPITAL | Age: 70
End: 2025-02-17
Payer: COMMERCIAL

## 2025-02-27 RX ORDER — CETIRIZINE HYDROCHLORIDE 10 MG/1
10 TABLET ORAL DAILY
COMMUNITY
Start: 2024-10-24 | End: 2025-10-24

## 2025-02-27 RX ORDER — AZELASTINE HYDROCHLORIDE 137 UG/1
SPRAY, METERED NASAL
COMMUNITY
Start: 2025-01-03

## 2025-02-27 NOTE — PRE-PROCEDURE INSTRUCTIONS
Pre-Surgery Instructions:   Medication Instructions    acetaminophen (TYLENOL) 500 mg tablet Uses PRN- OK to take day of surgery    amLODIPine-benazepril (LOTREL 5-10) 5-10 MG per capsule Take night before surgery    aspirin 81 mg chewable tablet Take day of surgery.    atorvastatin (LIPITOR) 40 mg tablet Take night before surgery    Azelastine HCl 137 MCG/SPRAY SOLN Take day of surgery.    Calcium Carb-Cholecalciferol (Caltrate 600+D3) 600-800 MG-UNIT TABS Stop taking 7 days prior to surgery.    cetirizine (ZyrTEC Allergy) 10 mg tablet Take day of surgery.    Multiple Vitamins-Minerals (Centrum Silver 50+Women) TABS Stop taking 7 days prior to surgery.    Medication instructions for day of surgery reviewed. Please take all instructed medications with only a sip of water.       You will receive a call one business day prior to surgery with an arrival time and hospital directions. If your surgery is scheduled on a Monday, the hospital will be calling you on the Friday prior to your surgery. If you have not heard from anyone by 8pm, please call the hospital supervisor through the hospital  at [phone]. ([place] [phone] or [place] [phone]).    Do not eat or drink anything after midnight the night before your surgery, including candy, mints, lifesavers, or chewing gum. Do not drink alcohol 24hrs before your surgery. Try not to smoke at least 24hrs before your surgery.       Follow the pre surgery showering instructions as listed in the “My Surgical Experience Booklet” or otherwise provided by your surgeon's office. Do not use a blade to shave the surgical area 1 week before surgery. It is okay to use a clean electric clippers up to 24 hours before surgery. Do not apply any lotions, creams, including makeup, cologne, deodorant, or perfumes after showering on the day of your surgery. Do not use dry shampoo, hair spray, hair gel, or any type of hair products.     No contact lenses, eye make-up, or  artificial eyelashes. Remove nail polish, including gel polish, and any artificial, gel, or acrylic nails if possible. Remove all jewelry including rings and body piercing jewelry.     Wear causal clothing that is easy to take on and off. Consider your type of surgery.    Keep any valuables, jewelry, piercings at home. Please bring any specially ordered equipment (sling, braces) if indicated.    Arrange for a responsible person to drive you to and from the hospital on the day of your surgery. Please confirm the visitor policy for the day of your procedure when you receive your phone call with an arrival time.     Call the surgeon's office with any new illnesses, exposures, or additional questions prior to surgery.    Please reference your “My Surgical Experience Booklet” for additional information to prepare for your upcoming surgery.     See Geriatric Assessment below...  Cognitive Assessment:   CAM:   TUG <15 sec: yes  Falls (last 6 months):  no  Hand  score:  -Attempt 1:  -Attempt 2:  -Attempt 3:  Khris Total Score: 21  PHQ- 9 Depression Scale: 0  Nutrition Assessment Score: 12  METS: 9.89  Incentive Spirometry Level:   Health goals:  -What are your overall health goals? Not have another stroke, and get to sisters hous in [place] for next summer    -What brings you strength? The children    -What activities are important to you? [place] the grandchildren, playing with them on the swings taking them for walks.

## 2025-03-04 LAB
ANION GAP SERPL CALCULATED.3IONS-SCNC: 12 MMOL/L (ref 3–11)
BUN SERPL-MCNC: 14 MG/DL (ref 7–25)
CALCIUM SERPL-MCNC: 10.1 MG/DL (ref 8.5–10.5)
CHLORIDE SERPL-SCNC: 106 MMOL/L (ref 100–109)
CO2 SERPL-SCNC: 26 MMOL/L (ref 21–31)
CREAT SERPL-MCNC: 0.71 MG/DL (ref 0.4–1.1)
CYTOLOGY CMNT CVX/VAG CYTO-IMP: ABNORMAL
ERYTHROCYTE [DISTWIDTH] IN BLOOD BY AUTOMATED COUNT: 14.9 % (ref 12–16)
GFR/BSA.PRED SERPLBLD CYS-BASED-ARV: 91 ML/MIN/{1.73_M2}
GLUCOSE SERPL-MCNC: 108 MG/DL (ref 65–99)
HCT VFR BLD AUTO: 39.8 % (ref 35–43)
HGB BLD-MCNC: 13.1 G/DL (ref 11.5–14.5)
MCH RBC QN AUTO: 28.3 PG (ref 26–34)
MCHC RBC AUTO-ENTMCNC: 33 G/DL (ref 32–37)
MCV RBC AUTO: 86 FL (ref 80–100)
PLATELET # BLD AUTO: 315 THOU/CMM (ref 140–350)
PMV BLD REES-ECKER: 9.9 FL (ref 7.5–11.3)
POTASSIUM SERPL-SCNC: 4.8 MMOL/L (ref 3.5–5.2)
RBC # BLD AUTO: 4.63 MILL/CMM (ref 3.7–4.7)
SODIUM SERPL-SCNC: 144 MMOL/L (ref 135–145)
WBC # BLD AUTO: 7.3 THOU/CMM (ref 4–10)

## 2025-03-08 ENCOUNTER — RESULTS FOLLOW-UP (OUTPATIENT)
Dept: VASCULAR SURGERY | Facility: CLINIC | Age: 70
End: 2025-03-08

## 2025-03-13 ENCOUNTER — OFFICE VISIT (OUTPATIENT)
Dept: NEUROLOGY | Facility: CLINIC | Age: 70
End: 2025-03-13
Payer: COMMERCIAL

## 2025-03-13 VITALS
TEMPERATURE: 97.3 F | WEIGHT: 183 LBS | HEIGHT: 69 IN | BODY MASS INDEX: 27.11 KG/M2 | HEART RATE: 67 BPM | DIASTOLIC BLOOD PRESSURE: 82 MMHG | SYSTOLIC BLOOD PRESSURE: 144 MMHG | OXYGEN SATURATION: 96 %

## 2025-03-13 DIAGNOSIS — I65.29 CAROTID ARTERY STENOSIS: Primary | ICD-10-CM

## 2025-03-13 DIAGNOSIS — I63.9 STROKE (CEREBRUM) (HCC): ICD-10-CM

## 2025-03-13 PROCEDURE — G2211 COMPLEX E/M VISIT ADD ON: HCPCS | Performed by: PSYCHIATRY & NEUROLOGY

## 2025-03-13 PROCEDURE — 99215 OFFICE O/P EST HI 40 MIN: CPT | Performed by: PSYCHIATRY & NEUROLOGY

## 2025-03-13 NOTE — ASSESSMENT & PLAN NOTE
Secondary Prevention -   -for medications - recommend continuation of combination of aspirin and atorvastatin  -Blood Pressure goal < 130/80, BP is slightly high to due to white coat hypertension  -LDL goal <70, last LDL is 50      Counseling/stroke education -   -I advised patient to avoid using NSAIDs for headaches or other pain and to stick to tylenol if needed  -Recommend lifestyle modifications such as mediterranean diet & regular exercise regimen atleast 4-5 times a week for 20-30 minutes.   -I educated patient/family regarding medication compliance  -encourage control of diabetes and hypertension; defer management to primary   Orders:    Ambulatory Referral to Sleep Medicine; Future

## 2025-03-13 NOTE — PROGRESS NOTES
"Name: Sanam Castro      : 1955      MRN: [id number]  Encounter Provider: Esther Spain MD  Encounter Date: 3/13/2025   Encounter department: [facility] [place]  :  Assessment & Plan  Carotid artery stenosis  -patient is getting her Left CEA done next Wednesday by vascular surgery         Stroke (cerebrum) (HCC)  Secondary Prevention -   -for medications - recommend continuation of combination of aspirin and atorvastatin  -Blood Pressure goal < 130/80, BP is slightly high to due to white coat hypertension  -LDL goal <70, last LDL is 50      Counseling/stroke education -   -I advised patient to avoid using NSAIDs for headaches or other pain and to stick to tylenol if needed  -Recommend lifestyle modifications such as mediterranean diet & regular exercise regimen atleast 4-5 times a week for 20-30 minutes.   -I educated patient/family regarding medication compliance  -encourage control of diabetes and hypertension; defer management to primary   Orders:    Ambulatory Referral to Sleep Medicine; Future    Follow up in 6 months     I would be happy to see the patient sooner if any new questions/concerns arise.  Patient/Guardian was advised to the call the office if they have any questions and concerns in the meantime.     We discussed \"red flag\" headache symptoms including symptoms such as facial droop on one side, weakness/paralysis on either side, speech trouble, numbness on one side, balance issues, any vision changes, extreme dizziness or significant increase in severity of headache or a sudden onset severe headache, patient is to call  immediately or to proceed to the nearest ER.           History of Present Illness   HPI     This is a 68 y/o Female with history of HTN, prior SAH in the setting of traumatic fall in , who is here as a follow up for right thalamic infarct.  CTA head and neck shows left ICA critical stenosis. And has seen vascular surgery as well. " "    Patient is nervous about her upcoming Left CEA next Wednesday.    Patient denies any new TIA/CVA like symptoms and is compliant with her medications and is tolerating her medications, and no bleeding, bruising    No residual deficits from her CVA.       Review of Systems   Constitutional: Negative.    HENT: Negative.     Eyes: Negative.    Respiratory: Negative.     Cardiovascular: Negative.    Gastrointestinal: Negative.    Endocrine: Negative.    Genitourinary: Negative.    Musculoskeletal: Negative.    Skin: Negative.    Allergic/Immunologic: Negative.    Neurological: Negative.    Hematological: Negative.    Psychiatric/Behavioral: Negative.     All other systems reviewed and are negative.   I have personally reviewed the MA's review of systems and made changes as necessary.         Objective   /82 (Patient Position: Sitting, Cuff Size: Standard)   Pulse 67   Temp (!) 97.3 °F (36.3 °C) (Temporal)   Ht 5' 9\" (1.753 m)   Wt 83 kg (183 lb)   SpO2 96%   BMI 27.02 kg/m²     Physical Exam  General - patient is alert   Speech - no dysarthria noted, no aphasia noted.     Neuro:   Cranial nerves: PERRL, EOMI, facial sensation intact to soft touch in V1, V2 and V3, no facial asymmetry noted, uvula/palate midline, tongue midline.   Motor: 5/5 throughout, normal tone, no pronator drift noted.   Sensory - intact to soft touch throughout  Reflexes - 2+ throughout  Coordination - no ataxia/dysmetria noted  Gait - normal    Neurological Exam        Administrative Statements   I have spent a total time of 40 minutes in caring for this patient on the day of the visit/encounter including Risks and benefits of tx options, Instructions for management, Patient and family education, Counseling / Coordination of care, Documenting in the medical record, Reviewing/placing orders in the medical record (including tests, medications, and/or procedures), Obtaining or reviewing history  , and Communicating with other " healthcare professionals .

## 2025-03-14 ENCOUNTER — TELEPHONE (OUTPATIENT)
Dept: VASCULAR SURGERY | Facility: CLINIC | Age: 70
End: 2025-03-14

## 2025-03-14 NOTE — TELEPHONE ENCOUNTER
Left voicemail for PT that the appointment day and time has not changed, the only thing that has changed is the provider. Instead of seeing Akosua she will now be seeing Deann

## 2025-03-19 ENCOUNTER — APPOINTMENT (OUTPATIENT)
Dept: NON INVASIVE DIAGNOSTICS | Facility: HOSPITAL | Age: 70
End: 2025-03-19
Payer: COMMERCIAL

## 2025-03-19 ENCOUNTER — ANESTHESIA (OUTPATIENT)
Dept: PERIOP | Facility: HOSPITAL | Age: 70
End: 2025-03-19
Payer: COMMERCIAL

## 2025-03-19 ENCOUNTER — HOSPITAL ENCOUNTER (INPATIENT)
Facility: HOSPITAL | Age: 70
LOS: 1 days | Discharge: HOME | End: 2025-03-20
Attending: SURGERY | Admitting: SURGERY
Payer: COMMERCIAL

## 2025-03-19 DIAGNOSIS — Z98.890 HISTORY OF CEA (CAROTID ENDARTERECTOMY): ICD-10-CM

## 2025-03-19 DIAGNOSIS — I77.9 CAROTID ARTERY DISEASE, UNSPECIFIED LATERALITY (HCC): ICD-10-CM

## 2025-03-19 DIAGNOSIS — Z01.818 PRE-OP TESTING: Primary | ICD-10-CM

## 2025-03-19 DIAGNOSIS — I65.22 CAROTID STENOSIS, ASYMPTOMATIC, LEFT: ICD-10-CM

## 2025-03-19 PROBLEM — Z87.891 FORMER SMOKER: Status: ACTIVE | Noted: 2025-03-19

## 2025-03-19 LAB
ABO GROUP BLD: NORMAL
ABO GROUP BLD: NORMAL
BLD GP AB SCN SERPL QL: NEGATIVE
KCT BLD-ACNC: 213 SEC (ref 89–137)
KCT BLD-ACNC: 250 SEC (ref 89–137)
KCT BLD-ACNC: 250 SEC (ref 89–137)
PLATELET # BLD AUTO: 210 THOUSANDS/UL (ref 149–390)
PMV BLD AUTO: 10.6 FL (ref 8.9–12.7)
RH BLD: POSITIVE
RH BLD: POSITIVE
SPECIMEN EXPIRATION DATE: NORMAL
SPECIMEN SOURCE: ABNORMAL

## 2025-03-19 PROCEDURE — 99223 1ST HOSP IP/OBS HIGH 75: CPT | Performed by: INTERNAL MEDICINE

## 2025-03-19 PROCEDURE — 86901 BLOOD TYPING SEROLOGIC RH(D): CPT | Performed by: ANESTHESIOLOGY

## 2025-03-19 PROCEDURE — 86850 RBC ANTIBODY SCREEN: CPT | Performed by: ANESTHESIOLOGY

## 2025-03-19 PROCEDURE — 86900 BLOOD TYPING SEROLOGIC ABO: CPT | Performed by: ANESTHESIOLOGY

## 2025-03-19 PROCEDURE — 4A133B1 MONITORING OF ARTERIAL PRESSURE, PERIPHERAL, PERCUTANEOUS APPROACH: ICD-10-PCS | Performed by: ANESTHESIOLOGY

## 2025-03-19 PROCEDURE — 85049 AUTOMATED PLATELET COUNT: CPT | Performed by: SURGERY

## 2025-03-19 PROCEDURE — 03UJ0KZ SUPPLEMENT LEFT COMMON CAROTID ARTERY WITH NONAUTOLOGOUS TISSUE SUBSTITUTE, OPEN APPROACH: ICD-10-PCS | Performed by: SURGERY

## 2025-03-19 PROCEDURE — 03CJ0ZZ EXTIRPATION OF MATTER FROM LEFT COMMON CAROTID ARTERY, OPEN APPROACH: ICD-10-PCS | Performed by: SURGERY

## 2025-03-19 PROCEDURE — 93882 EXTRACRANIAL UNI/LTD STUDY: CPT

## 2025-03-19 PROCEDURE — 35301 RECHANNELING OF ARTERY: CPT | Performed by: SURGERY

## 2025-03-19 PROCEDURE — 4A133J1 MONITORING OF ARTERIAL PULSE, PERIPHERAL, PERCUTANEOUS APPROACH: ICD-10-PCS | Performed by: ANESTHESIOLOGY

## 2025-03-19 PROCEDURE — C1781 MESH (IMPLANTABLE): HCPCS | Performed by: SURGERY

## 2025-03-19 PROCEDURE — 85347 COAGULATION TIME ACTIVATED: CPT

## 2025-03-19 PROCEDURE — NC001 PR NO CHARGE: Performed by: SURGERY

## 2025-03-19 PROCEDURE — 03HY32Z INSERTION OF MONITORING DEVICE INTO UPPER ARTERY, PERCUTANEOUS APPROACH: ICD-10-PCS | Performed by: ANESTHESIOLOGY

## 2025-03-19 DEVICE — XENOSURE BIOLOGIC PATCH, 0.8CM X 8CM, EIFU
Type: IMPLANTABLE DEVICE | Site: CAROTID | Status: FUNCTIONAL
Brand: XENOSURE BIOLOGIC PATCH

## 2025-03-19 RX ORDER — ATORVASTATIN CALCIUM 40 MG/1
40 TABLET, FILM COATED ORAL EVERY EVENING
Status: DISCONTINUED | OUTPATIENT
Start: 2025-03-19 | End: 2025-03-20 | Stop reason: HOSPADM

## 2025-03-19 RX ORDER — HYDROMORPHONE HCL/PF 1 MG/ML
0.5 SYRINGE (ML) INJECTION
Status: DISCONTINUED | OUTPATIENT
Start: 2025-03-19 | End: 2025-03-19 | Stop reason: HOSPADM

## 2025-03-19 RX ORDER — PROPOFOL 10 MG/ML
INJECTION, EMULSION INTRAVENOUS AS NEEDED
Status: DISCONTINUED | OUTPATIENT
Start: 2025-03-19 | End: 2025-03-19

## 2025-03-19 RX ORDER — HYDRALAZINE HYDROCHLORIDE 20 MG/ML
15 INJECTION INTRAMUSCULAR; INTRAVENOUS
Status: DISCONTINUED | OUTPATIENT
Start: 2025-03-19 | End: 2025-03-20 | Stop reason: HOSPADM

## 2025-03-19 RX ORDER — SENNOSIDES 8.6 MG
1 TABLET ORAL DAILY
Status: DISCONTINUED | OUTPATIENT
Start: 2025-03-19 | End: 2025-03-20 | Stop reason: HOSPADM

## 2025-03-19 RX ORDER — DEXAMETHASONE SODIUM PHOSPHATE 10 MG/ML
INJECTION, SOLUTION INTRAMUSCULAR; INTRAVENOUS AS NEEDED
Status: DISCONTINUED | OUTPATIENT
Start: 2025-03-19 | End: 2025-03-19

## 2025-03-19 RX ORDER — MAGNESIUM HYDROXIDE 1200 MG/15ML
LIQUID ORAL AS NEEDED
Status: DISCONTINUED | OUTPATIENT
Start: 2025-03-19 | End: 2025-03-19 | Stop reason: HOSPADM

## 2025-03-19 RX ORDER — ONDANSETRON 2 MG/ML
INJECTION INTRAMUSCULAR; INTRAVENOUS AS NEEDED
Status: DISCONTINUED | OUTPATIENT
Start: 2025-03-19 | End: 2025-03-19

## 2025-03-19 RX ORDER — ASPIRIN 81 MG/1
81 TABLET, CHEWABLE ORAL DAILY
Status: DISCONTINUED | OUTPATIENT
Start: 2025-03-20 | End: 2025-03-20 | Stop reason: HOSPADM

## 2025-03-19 RX ORDER — SODIUM CHLORIDE 9 MG/ML
INJECTION, SOLUTION INTRAVENOUS CONTINUOUS PRN
Status: DISCONTINUED | OUTPATIENT
Start: 2025-03-19 | End: 2025-03-19

## 2025-03-19 RX ORDER — EPHEDRINE SULFATE 50 MG/ML
INJECTION INTRAVENOUS AS NEEDED
Status: DISCONTINUED | OUTPATIENT
Start: 2025-03-19 | End: 2025-03-19

## 2025-03-19 RX ORDER — FENTANYL CITRATE 50 UG/ML
INJECTION, SOLUTION INTRAMUSCULAR; INTRAVENOUS AS NEEDED
Status: DISCONTINUED | OUTPATIENT
Start: 2025-03-19 | End: 2025-03-19

## 2025-03-19 RX ORDER — CEFAZOLIN SODIUM 2 G/50ML
2000 SOLUTION INTRAVENOUS ONCE
Status: COMPLETED | OUTPATIENT
Start: 2025-03-19 | End: 2025-03-19

## 2025-03-19 RX ORDER — LIDOCAINE HCL/PF 100 MG/5ML
SYRINGE (ML) INJECTION AS NEEDED
Status: DISCONTINUED | OUTPATIENT
Start: 2025-03-19 | End: 2025-03-19

## 2025-03-19 RX ORDER — FENTANYL CITRATE/PF 50 MCG/ML
25 SYRINGE (ML) INJECTION
Status: DISCONTINUED | OUTPATIENT
Start: 2025-03-19 | End: 2025-03-19 | Stop reason: HOSPADM

## 2025-03-19 RX ORDER — ACETAMINOPHEN 325 MG/1
975 TABLET ORAL EVERY 6 HOURS PRN
Status: DISCONTINUED | OUTPATIENT
Start: 2025-03-19 | End: 2025-03-20 | Stop reason: HOSPADM

## 2025-03-19 RX ORDER — CHLORHEXIDINE GLUCONATE ORAL RINSE 1.2 MG/ML
15 SOLUTION DENTAL ONCE
Status: COMPLETED | OUTPATIENT
Start: 2025-03-19 | End: 2025-03-19

## 2025-03-19 RX ORDER — ROCURONIUM BROMIDE 10 MG/ML
INJECTION, SOLUTION INTRAVENOUS AS NEEDED
Status: DISCONTINUED | OUTPATIENT
Start: 2025-03-19 | End: 2025-03-19

## 2025-03-19 RX ORDER — HEPARIN SODIUM 1000 [USP'U]/ML
INJECTION, SOLUTION INTRAVENOUS; SUBCUTANEOUS AS NEEDED
Status: DISCONTINUED | OUTPATIENT
Start: 2025-03-19 | End: 2025-03-19

## 2025-03-19 RX ORDER — SODIUM CHLORIDE, SODIUM LACTATE, POTASSIUM CHLORIDE, CALCIUM CHLORIDE 600; 310; 30; 20 MG/100ML; MG/100ML; MG/100ML; MG/100ML
125 INJECTION, SOLUTION INTRAVENOUS CONTINUOUS
Status: DISCONTINUED | OUTPATIENT
Start: 2025-03-19 | End: 2025-03-19

## 2025-03-19 RX ORDER — LABETALOL HYDROCHLORIDE 5 MG/ML
5 INJECTION, SOLUTION INTRAVENOUS
Status: DISCONTINUED | OUTPATIENT
Start: 2025-03-19 | End: 2025-03-20 | Stop reason: HOSPADM

## 2025-03-19 RX ORDER — PROTAMINE SULFATE 10 MG/ML
INJECTION, SOLUTION INTRAVENOUS AS NEEDED
Status: DISCONTINUED | OUTPATIENT
Start: 2025-03-19 | End: 2025-03-19

## 2025-03-19 RX ORDER — MIDAZOLAM HYDROCHLORIDE 2 MG/2ML
INJECTION, SOLUTION INTRAMUSCULAR; INTRAVENOUS AS NEEDED
Status: DISCONTINUED | OUTPATIENT
Start: 2025-03-19 | End: 2025-03-19

## 2025-03-19 RX ORDER — HEPARIN SODIUM 5000 [USP'U]/ML
5000 INJECTION, SOLUTION INTRAVENOUS; SUBCUTANEOUS EVERY 8 HOURS SCHEDULED
Status: DISCONTINUED | OUTPATIENT
Start: 2025-03-19 | End: 2025-03-20 | Stop reason: HOSPADM

## 2025-03-19 RX ORDER — ONDANSETRON 2 MG/ML
4 INJECTION INTRAMUSCULAR; INTRAVENOUS ONCE AS NEEDED
Status: DISCONTINUED | OUTPATIENT
Start: 2025-03-19 | End: 2025-03-19 | Stop reason: HOSPADM

## 2025-03-19 RX ORDER — DOCUSATE SODIUM 100 MG/1
100 CAPSULE, LIQUID FILLED ORAL 2 TIMES DAILY
Status: DISCONTINUED | OUTPATIENT
Start: 2025-03-19 | End: 2025-03-20 | Stop reason: HOSPADM

## 2025-03-19 RX ORDER — OXYCODONE HYDROCHLORIDE 5 MG/1
5 TABLET ORAL EVERY 4 HOURS PRN
Refills: 0 | Status: DISCONTINUED | OUTPATIENT
Start: 2025-03-19 | End: 2025-03-20 | Stop reason: HOSPADM

## 2025-03-19 RX ADMIN — DEXAMETHASONE SODIUM PHOSPHATE 10 MG: 10 INJECTION INTRAMUSCULAR; INTRAVENOUS at 12:26

## 2025-03-19 RX ADMIN — PROTAMINE SULFATE 40 MG: 10 INJECTION, SOLUTION INTRAVENOUS at 14:22

## 2025-03-19 RX ADMIN — FENTANYL CITRATE 50 MCG: 50 INJECTION INTRAMUSCULAR; INTRAVENOUS at 14:46

## 2025-03-19 RX ADMIN — ONDANSETRON 4 MG: 2 INJECTION INTRAMUSCULAR; INTRAVENOUS at 14:28

## 2025-03-19 RX ADMIN — HEPARIN SODIUM 5000 UNITS: 5000 INJECTION INTRAVENOUS; SUBCUTANEOUS at 22:02

## 2025-03-19 RX ADMIN — SODIUM CHLORIDE: 0.9 INJECTION, SOLUTION INTRAVENOUS at 11:48

## 2025-03-19 RX ADMIN — SUGAMMADEX 200 MG: 100 INJECTION, SOLUTION INTRAVENOUS at 14:48

## 2025-03-19 RX ADMIN — NICARDIPINE HYDROCHLORIDE 2 MG/HR: 2.5 INJECTION INTRAVENOUS at 12:57

## 2025-03-19 RX ADMIN — FENTANYL CITRATE 25 MCG: 50 INJECTION INTRAMUSCULAR; INTRAVENOUS at 12:11

## 2025-03-19 RX ADMIN — SENNOSIDES 8.6 MG: 8.6 TABLET, FILM COATED ORAL at 17:21

## 2025-03-19 RX ADMIN — HEPARIN SODIUM 5000 UNITS: 5000 INJECTION INTRAVENOUS; SUBCUTANEOUS at 17:21

## 2025-03-19 RX ADMIN — PROPOFOL 50 MG: 10 INJECTION, EMULSION INTRAVENOUS at 14:46

## 2025-03-19 RX ADMIN — FENTANYL CITRATE 50 MCG: 50 INJECTION INTRAMUSCULAR; INTRAVENOUS at 14:43

## 2025-03-19 RX ADMIN — ROCURONIUM 10 MG: 50 INJECTION, SOLUTION INTRAVENOUS at 13:27

## 2025-03-19 RX ADMIN — SODIUM CHLORIDE, SODIUM LACTATE, POTASSIUM CHLORIDE, AND CALCIUM CHLORIDE 125 ML/HR: .6; .31; .03; .02 INJECTION, SOLUTION INTRAVENOUS at 09:45

## 2025-03-19 RX ADMIN — NICARDIPINE HYDROCHLORIDE 50 MCG: 2.5 INJECTION INTRAVENOUS at 12:59

## 2025-03-19 RX ADMIN — EPHEDRINE SULFATE 10 MG: 50 INJECTION INTRAVENOUS at 12:36

## 2025-03-19 RX ADMIN — OXYCODONE 5 MG: 5 TABLET ORAL at 17:30

## 2025-03-19 RX ADMIN — ROCURONIUM 50 MG: 50 INJECTION, SOLUTION INTRAVENOUS at 11:38

## 2025-03-19 RX ADMIN — DOCUSATE SODIUM 100 MG: 100 CAPSULE, LIQUID FILLED ORAL at 17:21

## 2025-03-19 RX ADMIN — HEPARIN SODIUM 8000 UNITS: 1000 INJECTION, SOLUTION INTRAVENOUS; SUBCUTANEOUS at 13:12

## 2025-03-19 RX ADMIN — MIDAZOLAM 2 MG: 1 INJECTION INTRAMUSCULAR; INTRAVENOUS at 11:32

## 2025-03-19 RX ADMIN — CEFAZOLIN SODIUM 2000 MG: 2 SOLUTION INTRAVENOUS at 11:49

## 2025-03-19 RX ADMIN — ACETAMINOPHEN 975 MG: 325 TABLET, FILM COATED ORAL at 17:30

## 2025-03-19 RX ADMIN — EPHEDRINE SULFATE 5 MG: 50 INJECTION INTRAVENOUS at 11:54

## 2025-03-19 RX ADMIN — ATORVASTATIN CALCIUM 40 MG: 40 TABLET, FILM COATED ORAL at 17:22

## 2025-03-19 RX ADMIN — CHLORHEXIDINE GLUCONATE 15 ML: 1.2 SOLUTION ORAL at 10:03

## 2025-03-19 RX ADMIN — FENTANYL CITRATE 100 MCG: 50 INJECTION INTRAMUSCULAR; INTRAVENOUS at 11:38

## 2025-03-19 RX ADMIN — OXYCODONE 5 MG: 5 TABLET ORAL at 22:02

## 2025-03-19 RX ADMIN — NICARDIPINE HYDROCHLORIDE 50 MCG: 2.5 INJECTION INTRAVENOUS at 14:11

## 2025-03-19 RX ADMIN — FENTANYL CITRATE 50 MCG: 50 INJECTION INTRAMUSCULAR; INTRAVENOUS at 12:23

## 2025-03-19 RX ADMIN — LIDOCAINE HYDROCHLORIDE 80 MG: 20 INJECTION INTRAVENOUS at 11:38

## 2025-03-19 RX ADMIN — NICARDIPINE HYDROCHLORIDE 100 MCG: 2.5 INJECTION INTRAVENOUS at 14:15

## 2025-03-19 RX ADMIN — PROPOFOL 40 MG: 10 INJECTION, EMULSION INTRAVENOUS at 12:06

## 2025-03-19 RX ADMIN — PROTAMINE SULFATE 10 MG: 10 INJECTION, SOLUTION INTRAVENOUS at 14:27

## 2025-03-19 RX ADMIN — ROCURONIUM 10 MG: 50 INJECTION, SOLUTION INTRAVENOUS at 12:28

## 2025-03-19 RX ADMIN — ROCURONIUM 10 MG: 50 INJECTION, SOLUTION INTRAVENOUS at 14:00

## 2025-03-19 RX ADMIN — FENTANYL CITRATE 25 MCG: 50 INJECTION INTRAMUSCULAR; INTRAVENOUS at 12:17

## 2025-03-19 RX ADMIN — EPHEDRINE SULFATE 5 MG: 50 INJECTION INTRAVENOUS at 11:52

## 2025-03-19 RX ADMIN — PHENYLEPHRINE HYDROCHLORIDE 20 MCG/MIN: 10 INJECTION INTRAVENOUS at 11:51

## 2025-03-19 NOTE — ASSESSMENT & PLAN NOTE
69 y.o. female with a PMH of HTN, HLD, SAH who was hospitalized in May 2024 for R hemispheric stroke and known severe L carotid artery stenosis.  Originally scheduled for left carotid endarterectomy after her last visit in August however patient had recurrent sinus infection as well as COVID with subsequent sinus surgery.  Patient since doing well. Patient presenting for scheduled left carotid endarterectomy.     S/p L CEA with bovine pericardial patch on 3/19    Plan  Regular diet  As needed pain control  Close hemodynamic monitoring with goal SBP between 100-160  Continue A-line for close hemodynamic monitoring  Monitor neuro status  Continue ASA and statin  DVT PPx: SQH  Encourage OOB/ambulation  Encourage I-S  Remainder of care per ICU team

## 2025-03-19 NOTE — CONSULTS
History of Present Illness     69-year-old female with a PMH of known asymptomatic severe left carotid stenosis greater then 80%, HLD, SAH, meningioma,  and R hemispheric stroke (2024, w/residual L sided UE/LE weakness) to be admitted to the ICU s/p planned L CEA with bovine pericardial patch for monitoring overnight .       Assessment:   Patient tolerated the procedure well. There were no intraoperative complications.   No acute complaints or concerns.     Plan: To closely monitor the patient's hemodynamic status, BP control, and pain regimen. Monitor for any signs of surgical site infection or hematoma. Detailed plan below.     CVS:   Labetalol 5mg IV q15 for BP >160   Hydralazine 15mg IV q 15 PRN for BP > 160 after 2 doses Labetalol.   Nicardipine per titration parameters despite previous treatment.   NaCl 0.9% 500ml IV Bolus PRN for SBP < 100   Phenylnephrine 25-180mcg per titration parameters for SBP < 100 or symptomatic following fluid bolus.   Pulm:    On 3L NC, wean as tolerated.   Fluids:   MIVFL, LR 125cc/hr   GI:   Senna + colace. Regular diet after passing swallow eval.     Heme:   Aspirin 81mg, statin 40 mg, and heparin 5k units subq q8.    Elevate HOB.   Renal:   Monitor Is/Os.     Neuro:   Tylenol 975 q6 PRN & Oxycodone 5mg PRN l9ftjwk. Frequent neuro checks.   :    No concerns                Review of Systems Per HPI     Historical Information   Past Medical History:   Diagnosis Date    History of ovarian cyst     Hyperlipidemia     Hypertension     Melanoma of shoulder, right (HCC)     Stroke (HCC) 05/2024    no deficits     Past Surgical History:   Procedure Laterality Date    OVARIAN CYST REMOVAL Left     SINUS SURGERY       Social History   Social History     Substance and Sexual Activity   Alcohol Use Not Currently     Social History     Substance and Sexual Activity   Drug Use Not Currently     Social History     Tobacco Use   Smoking Status Former    Current packs/day: 0.00    Average  "packs/day: 0.5 packs/day for 31.7 years (15.8 ttl pk-yrs)    Types: Cigarettes    Start date:     Quit date: 2016    Years since quittin.5   Smokeless Tobacco Never     Family History:     Meds/Allergies   all current active meds have been reviewed  Allergies   Allergen Reactions    Pollen Extract Other (See Comments)     seasonal       Objective   Vitals: Blood pressure 136/76, pulse 89, temperature 98.2 °F (36.8 °C), temperature source Oral, resp. rate 20, height 5' 8\" (1.727 m), weight 81.5 kg (179 lb 10.8 oz), SpO2 98%, not currently breastfeeding.    Intake/Output Summary (Last 24 hours) at 3/19/2025 1652  Last data filed at 3/19/2025 1510  Gross per 24 hour   Intake 1975 ml   Output --   Net 1975 ml     Invasive Devices       Peripheral Intravenous Line  Duration             Peripheral IV 25 Right Hand <1 day    Peripheral IV 25 Right Hand <1 day              Arterial Line  Duration             Arterial Line 25 Left Radial <1 day                    Physical Exam  Vitals and nursing note reviewed.   Constitutional:       General: She is not in acute distress.     Appearance: Normal appearance. She is normal weight. She is not ill-appearing or toxic-appearing.   HENT:      Head: Normocephalic.      Right Ear: External ear normal.      Left Ear: External ear normal.      Nose: Nose normal.      Mouth/Throat:      Pharynx: No oropharyngeal exudate or posterior oropharyngeal erythema.   Eyes:      General: No scleral icterus.        Right eye: No discharge.         Left eye: No discharge.      Extraocular Movements: Extraocular movements intact.      Conjunctiva/sclera: Conjunctivae normal.      Pupils: Pupils are equal, round, and reactive to light.   Neck:      Comments: Mild erythema of L neck.   Mild swelling L neck.   Surgical wound appreciated.   Cardiovascular:      Rate and Rhythm: Normal rate and regular rhythm.      Pulses: Normal pulses.      Heart sounds: Normal heart " sounds. No murmur heard.  Pulmonary:      Effort: Pulmonary effort is normal. No respiratory distress.      Breath sounds: Normal breath sounds.      Comments: On 3L NC   Chest:      Chest wall: No tenderness.   Abdominal:      General: There is no distension.      Palpations: Abdomen is soft.      Tenderness: There is no abdominal tenderness. There is no guarding or rebound.   Musculoskeletal:      Right lower leg: No edema.      Left lower leg: No edema.   Skin:     General: Skin is warm and dry.      Capillary Refill: Capillary refill takes less than 2 seconds.   Neurological:      General: No focal deficit present.      Mental Status: She is alert and oriented to person, place, and time.   Psychiatric:         Mood and Affect: Mood normal.         Behavior: Behavior normal.         Thought Content: Thought content normal.         Judgment: Judgment normal.         Lab Results: Results Review Statement: I personally reviewed the following image studies/reports in PACS and discussed pertinent findings with Radiology: Ultrasound(s). My interpretation of the radiology images/reports is: See above .    VTE Prophylaxis: Heparin

## 2025-03-19 NOTE — ANESTHESIA PROCEDURE NOTES
"Arterial Line Insertion    Performed by: Kia Lance DO  Authorized by: Kia Lance DO  Consent: Verbal consent obtained.  Risks and benefits: risks, benefits and alternatives were discussed  Patient understanding: patient states understanding of the procedure being performed  Patient consent: the patient's understanding of the procedure matches consent given  Procedure consent: procedure consent matches procedure scheduled  Relevant documents: relevant documents present and verified  Patient identity confirmed: arm band and hospital-assigned identification number  Time out: Immediately prior to procedure a \"time out\" was called to verify the correct patient, procedure, equipment, support staff and site/side marked as required.  Preparation: Patient was prepped and draped in the usual sterile fashion.  Indications: hemodynamic monitoring  Orientation:  Left  Location: radial artery  Sedation:  Patient sedated: pt under general anesthesia.    Procedure Details:      Alejandro's test normal: yes  Needle gauge: 20  Placement technique:  Anatomical landmarks and ultrasound guided  Ultrasound image availability:  Vascular entry visualized in real time  Number of attempts: 1    Post-procedure:  Post-procedure: dressing applied  Waveform: good waveform  Post-procedure CNS: normal  Patient tolerance: Patient tolerated the procedure well with no immediate complications          "

## 2025-03-19 NOTE — H&P
H&P - Vascular Surgery   Name: Sanam Castro 69 y.o. female I MRN: [id number]  Unit/Bed#: OR POOL I Date of Admission: 3/19/2025   Date of Service: 3/19/2025 I Hospital Day: 0     Assessment & Plan    69-year-old female with a past medical history of hypertension, HLD, subarachnoid hemorrhage who was hospitalized in May 2024 for a right hemispheric stroke and known severe left carotid artery stenosis.  Patient was originally scheduled for a left carotid endarterectomy after her last visit in August however patient had a recurrent sinus infection as well as COVID and then required sinus surgery.  Patient has since recovered well from her sinus surgery and is interested in again rescheduling for surgery.  We discussed again the procedure, risks, and expectations with recovery.  Patient is agreeable in proceeding.  Electronic consent was signed in office       History of Present Illness   Sanam Castro is a 69 y.o. female who presents with asymptomatic left carotid stenosis.    Review of Systems  Medical History Review: I have reviewed the patient's PMH, PSH, Social History, Family History, Meds, and Allergies     Objective :       I/O       None            Physical Exam  Vitals and nursing note reviewed.   Constitutional:       General: She is not in acute distress.     Appearance: She is well-developed.   HENT:      Head: Normocephalic and atraumatic.   Eyes:      Conjunctiva/sclera: Conjunctivae normal.   Cardiovascular:      Rate and Rhythm: Normal rate and regular rhythm.   Pulmonary:      Effort: Pulmonary effort is normal.      Breath sounds: Normal breath sounds.   Abdominal:      Palpations: Abdomen is soft.      Tenderness: There is no abdominal tenderness.   Musculoskeletal:      Cervical back: Neck supple.   Skin:     General: Skin is warm and dry.      Capillary Refill: Capillary refill takes less than 2 seconds.   Neurological:      Mental Status: She is alert.   Psychiatric:         Mood and  "Affect: Mood normal.            Lab Results: I have reviewed the following results:  No results for input(s): \"WBC\", \"HGB\", \"HCT\", \"PLT\", \"BANDSPCT\", \"SODIUM\", \"K\", \"CL\", \"CO2\", \"BUN\", \"CREATININE\", \"GLUC\", \"CAIONIZED\", \"MG\", \"PHOS\", \"AST\", \"ALT\", \"ALB\", \"TBILI\", \"DBILI\", \"ALKPHOS\", \"PTT\", \"INR\", \"HSTNI0\", \"HSTNI2\", \"BNP\", \"LACTICACID\" in the last 72 hours.    Imaging Results Review: No pertinent imaging studies reviewed.  Other Study Results Review: No additional pertinent studies reviewed.    VTE Prophylaxis: Sequential compression device (Venodyne)   "

## 2025-03-19 NOTE — QUICK NOTE
"Vascular Surgery   Post-Op Check Progress Note   Sanam Castro 69 y.o. female MRN: [id number]  Unit/Bed#: ICU 07 Encounter: [id number]    Assessment & Plan  Asymptomatic stenosis of left carotid artery  69 y.o. female with a PMH of HTN, HLD, SAH who was hospitalized in May 2024 for R hemispheric stroke and known severe L carotid artery stenosis.  Originally scheduled for left carotid endarterectomy after her last visit in August however patient had recurrent sinus infection as well as COVID with subsequent sinus surgery.  Patient since doing well. Patient presenting for scheduled left carotid endarterectomy.     S/p L CEA with bovine pericardial patch on 3/19    Plan  Regular diet  As needed pain control  Close hemodynamic monitoring with goal SBP between 100-160  Continue A-line for close hemodynamic monitoring  Monitor neuro status  Continue ASA and statin  DVT PPx: SQH  Encourage OOB/ambulation  Encourage I-S  Remainder of care per ICU team      Subjective/Objective     Subjective:   Patient reports pain is well-controlled.  Denies any difficulty swallowing has tolerated some liquids but has not yet eaten a meal.  Denies any numbness or tingling of her face, but notes mild postsurgical discomfort in left neck.  Denies any nausea or vomiting.    Objective:     Blood pressure 136/76, pulse 89, temperature 98.2 °F (36.8 °C), temperature source Oral, resp. rate 20, height 5' 8\" (1.727 m), weight 81.5 kg (179 lb 10.8 oz), SpO2 98%, not currently breastfeeding.,Body mass index is 27.32 kg/m².      Intake/Output Summary (Last 24 hours) at 3/19/2025 1806  Last data filed at 3/19/2025 1510  Gross per 24 hour   Intake 1975 ml   Output --   Net 1975 ml       Invasive Devices       Peripheral Intravenous Line  Duration             Peripheral IV 03/19/25 Right Hand <1 day    Peripheral IV 03/19/25 Right Hand <1 day              Arterial Line  Duration             Arterial Line 03/19/25 Left Radial <1 day              "       Physical Exam:    General: NAD  HENT: NCAT MMM  Neck: supple, no JVD.  Incision clean, dry, intact.  Soft and compressible.  Mild danyelle-incisional ecchymoses and slight edema at the surgical site.  CV: nl rate  Lungs: nl wob. No resp distress  ABD: Soft, nontender, nondistended  Extrem: No CCE.  Strength and sensation intact in all extremities.  Motor and sensory function intact in facial muscles.  Neuro: AAOx3        Melissa Rangel MD  3/19/2025

## 2025-03-19 NOTE — ANESTHESIA POSTPROCEDURE EVALUATION
Post-Op Assessment Note    CV Status:  Stable  Pain Score: 0    Pain management: adequate       Mental Status:  Alert and awake   Hydration Status:  Euvolemic   PONV Controlled:  Controlled   Airway Patency:  Patent  Airway: intubated     Post Op Vitals Reviewed: Yes    No anethesia notable event occurred.    Staff: Anesthesiologist           Last Filed PACU Vitals:  Vitals Value Taken Time   Temp 97.7 °F (36.5 °C) 03/19/25 1515   Pulse 78 03/19/25 1543   /63 03/19/25 1533   Resp 18 03/19/25 1533   SpO2 96 % 03/19/25 1543   Vitals shown include unfiled device data.    Modified Yanira:     Vitals Value Taken Time   Activity 2 03/19/25 1533   Respiration 2 03/19/25 1533   Circulation 2 03/19/25 1533   Consciousness 2 03/19/25 1533   Oxygen Saturation 1 03/19/25 1533     Modified Yanira Score: 9

## 2025-03-19 NOTE — ANESTHESIA PREPROCEDURE EVALUATION
Procedure:  LEFT CAROTID ENDARTERECTOMY (Left: Neck)    Relevant Problems   CARDIO   (+) Aortic ectasia (HCC)   (+) Asymptomatic stenosis of left carotid artery   (+) HLD (hyperlipidemia)   (+) Primary hypertension      NEURO/PSYCH   (+) Stroke (cerebrum) (HCC)   (+) Subarachnoid hemorrhage following injury, no loss of consciousness (HCC)      Behavioral Health   (+) Former smoker      Oncology   (+) Meningioma (HCC)      Surgery/Wound/Pain   (+) History of CEA (carotid endarterectomy)      Other   (+) Overweight with body mass index (BMI) of 26 to 26.9 in adult    2024 Echo:    Left Ventricle: Left ventricular cavity size is normal. Wall thickness is normal. The left ventricular ejection fraction is 60% by visual estimation. Systolic function is normal. Wall motion is normal. Diastolic function is mildly abnormal, consistent with grade I (abnormal) relaxation.    Right Ventricle: Right ventricular cavity size is normal. Systolic function is normal.    Left Atrium: The atrium is mildly dilated.    Aortic Valve: There is trace regurgitation. There is aortic valve sclerosis.    Mitral Valve: There is trace regurgitation.    Tricuspid Valve: Pulmonary artery systolic pressures cannot be estimated due to lack of tricuspid regurgitation jet.    There is no study for comparison.    Physical Exam    Airway    Mallampati score: II  TM Distance: <3 FB       Dental        Cardiovascular  Rhythm: regular, Rate: normal    Pulmonary   Breath sounds clear to auscultation    Other Findings  post-pubertal.      Anesthesia Plan  ASA Score- 3     Anesthesia Type- general with ASA Monitors.         Additional Monitors: arterial line.    Airway Plan: ETT.    Comment: 2nd PIV, art line, confirmed transfusion OK if needed  Plavix D/C'd a few months ago.       Plan Factors-Exercise tolerance (METS): >4 METS.    Chart reviewed.   Existing labs reviewed.     Patient is not a current smoker.      Obstructive sleep apnea risk education given  perioperatively.        Induction- intravenous.    Postoperative Plan- Plan for postoperative opioid use.         Informed Consent- Anesthetic plan and risks discussed with patient.        NPO Status:  Vitals Value Taken Time   Date of last liquid 03/19/25 03/19/25 0914   Time of last liquid 0720 03/19/25 0914   Date of last solid 03/18/25 03/19/25 0914   Time of last solid 1900 03/19/25 0914

## 2025-03-19 NOTE — PLAN OF CARE
Problem: PAIN - ADULT  Goal: Verbalizes/displays adequate comfort level or baseline comfort level  Description: Interventions:  - Encourage patient to monitor pain and request assistance  - Assess pain using appropriate pain scale  - Administer analgesics based on type and severity of pain and evaluate response  - Implement non-pharmacological measures as appropriate and evaluate response  - Consider cultural and social influences on pain and pain management  - Notify physician/advanced practitioner if interventions unsuccessful or patient reports new pain  Outcome: Progressing     Problem: INFECTION - ADULT  Goal: Absence or prevention of progression during hospitalization  Description: INTERVENTIONS:  - Assess and monitor for signs and symptoms of infection  - Monitor lab/diagnostic results  - Monitor all insertion sites, i.e. indwelling lines, tubes, and drains  - Monitor endotracheal if appropriate and nasal secretions for changes in amount and color  - [place] appropriate cooling/warming therapies per order  - Administer medications as ordered  - Instruct and encourage patient and family to use good hand hygiene technique  - Identify and instruct in appropriate isolation precautions for identified infection/condition  Outcome: Progressing  Goal: Absence of fever/infection during neutropenic period  Description: INTERVENTIONS:  - Monitor WBC    Outcome: Progressing     Problem: SAFETY ADULT  Goal: Patient will remain free of falls  Description: INTERVENTIONS:  - Educate patient/family on patient safety including physical limitations  - Instruct patient to call for assistance with activity   - Consult OT/PT to assist with strengthening/mobility   - Keep Call bell within reach  - Keep bed low and locked with side rails adjusted as appropriate  - Keep care items and personal belongings within reach  - Initiate and maintain comfort rounds  - Make Fall Risk Sign visible to staff  - Offer Toileting every 2 Hours,  in advance of need  - Initiate/Maintain bed alarm  - Obtain necessary fall risk management equipment  - Apply yellow socks and bracelet for high fall risk patients  - Consider moving patient to room near nurses station  Outcome: Progressing  Goal: Maintain or return to baseline ADL function  Description: INTERVENTIONS:  -  Assess patient's ability to carry out ADLs; assess patient's baseline for ADL function and identify physical deficits which impact ability to perform ADLs (bathing, care of mouth/teeth, toileting, grooming, dressing, etc.)  - Assess/evaluate cause of self-care deficits   - Assess range of motion  - Assess patient's mobility; develop plan if impaired  - Assess patient's need for assistive devices and provide as appropriate  - Encourage maximum independence but intervene and supervise when necessary  - Involve family in performance of ADLs  - Assess for home care needs following discharge   - Consider OT consult to assist with ADL evaluation and planning for discharge  - Provide patient education as appropriate  Outcome: Progressing  Goal: Maintains/Returns to pre admission functional level  Description: INTERVENTIONS:  - Perform AM-PAC 6 Click Basic Mobility/ Daily Activity assessment daily.  - Set and communicate daily mobility goal to care team and patient/family/caregiver.   - Collaborate with rehabilitation services on mobility goals if consulted  - Perform Range of Motion 3 times a day.  - Reposition patient every 2 hours.  - Dangle patient 3 times a day  - Stand patient 3 times a day  - Ambulate patient 3 times a day  - Out of bed to chair 3 times a day   - Out of bed for meals 3 times a day  - Out of bed for toileting  - Record patient progress and toleration of activity level   Outcome: Progressing     Problem: DISCHARGE PLANNING  Goal: Discharge to home or other facility with appropriate resources  Description: INTERVENTIONS:  - Identify barriers to discharge w/patient and caregiver  -  Arrange for needed discharge resources and transportation as appropriate  - Identify discharge learning needs (meds, wound care, etc.)  - Arrange for interpretive services to assist at discharge as needed  - Refer to Case Management Department for coordinating discharge planning if the patient needs post-hospital services based on physician/advanced practitioner order or complex needs related to functional status, cognitive ability, or social support system  Outcome: Progressing     Problem: Knowledge Deficit  Goal: Patient/family/caregiver demonstrates understanding of disease process, treatment plan, medications, and discharge instructions  Description: Complete learning assessment and assess knowledge base.  Interventions:  - Provide teaching at level of understanding  - Provide teaching via preferred learning methods  Outcome: Progressing

## 2025-03-19 NOTE — ANESTHESIA POSTPROCEDURE EVALUATION
Post-Op Assessment Note    CV Status:  Stable  Pain Score: 0    Pain management: adequate       Mental Status:  Alert and awake   Hydration Status:  Euvolemic   PONV Controlled:  Controlled   Airway Patency:  Patent  Airway: intubated     Post Op Vitals Reviewed: Yes    No anethesia notable event occurred.    Staff: CRNA           Last Filed PACU Vitals:  Vitals Value Taken Time   Temp 97.7    Pulse 82 03/19/25 1523   /80 03/19/25 1516   Resp 14    SpO2 93 % 03/19/25 1523   Vitals shown include unfiled device data.

## 2025-03-19 NOTE — OP NOTE
OPERATIVE REPORT  PATIENT NAME: Sanam Castro    :  1955  MRN: [id number]  Pt Location: [facility] OR ROOM 04    SURGERY DATE: 3/19/2025    Surgeons and Role:     * Krish Galloway MD - Primary     * Minh Paige MD - Resident    Preop Diagnosis:  Carotid stenosis, asymptomatic, left [I65.22]    Post-Op Diagnosis Codes:     * Carotid stenosis, asymptomatic, left [I65.22]    Procedure(s):  Left - LEFT CAROTID ENDARTERECTOMY    Specimen(s):  * No specimens in log *    Estimated Blood Loss:   Minimal    Drains:  * No LDAs found *    Anesthesia Type:   General    Operative Indications:  Carotid stenosis, asymptomatic, left [I65.22]  69-year-old female with a history of an asymptomatic left carotid artery stenosis greater than 80%.  Given severity of the stenosis I am recommending elective repair.  Will plan for a left carotid endarterectomy.  Discussed the surgery as well as the risks with the patient including bleeding, infection, stroke, nerve injury, restenosis.  Patient understands and is agreeable to proceed.  Consent was signed in office    Operative Findings:  Left carotid endarterectomy with bovine pericardial patch      Complications:   None    Procedure and Technique:  After informed consent was obtained, the patient was brought to the operating room and placed in the supine position. Perioperative IV antibiotics were given.  She was given anesthesia and endotracheally intubated. Ultrasound was used to examine the carotid artery.  A marking pen was used to shala the incision overlying the carotid artery. The patient was placed with the head turned laterally and slightly extended, with a shoulder roll.  She was then prepped and draped in the usual sterile fashion exposing the neck.  A timeout was performed.     A 15-blade was used to make the incision. Cautery was used to dissect through the soft tissue and platysma.  The sternocleidomastoid muscle was identified, dissected free along its medial  border, and retracted laterally.  Next, the internal jugular vein was identified and freed along its medial border.  The facial vein was identified, ligated with 2-0 silk sutures, and transected.  The internal jugular vein was then retracted laterally, exposing the carotid artery.  The dissection was continued at the common carotid artery.  It was freed circumferentially and encircled with a vessel loop.  Next, the external carotid artery and superior thyroid artery were identified, freed circumferentially, and encircled with vessel loops.  Lastly, the internal carotid artery was dissected free and encircled distally with a vessel loop.  8000 units of IV heparin were given.  After this was given time to circulate, an ACT was found to be 250.  Additional heparin was given as needed throughout the case to keep the ACT therapeutic. MAP was raised to >100mmHg. A Yasergil clamp was placed on the ICA. We allowed for 2 minutes to pass, no EEG changes were noticed. We continued, a clamp was placed on the CCA and the vessel loops were pulled taught on the external carotid.  An 11-blade was used to make a longitudinal arteriotomy in the common carotid artery and this was extended proximally and then distally along the internal carotid artery with Hunt scissors. Next, an endarterectomy spatula was used to create the endarterectomy plane.  This was continued towards the common carotid artery.  The plaque was sharply transected with Hunt scissors.  The plane was then continued distally in the internal carotid artery.  Again the plaque was sharply transected with Hunt scissors.  Lastly, the endarterectomy was continued to the external carotid artery, which was inverted, and the plaque was pulled free.  The endarterectomy bed was flushed with heparinized saline and all loose debris was pulled free.  Two 7-0 prolene sutures were used to tack down the distal endpoint. All endpoints were checked and noted to be adherent without  any flaps or debris.  A Xenosure bovine pericardial patch was selected and was sewn in using two 6-0 prolene sutures. One was used to parachute the patch to the distal portion of the arteriotomy visualizing each throw ensuring the vessel was not back-walled. The other was tacked down at the proximal end. We ran each of the sutures to the center of the patch. Prior to completion the arteries were bled and back-bled, and the endarterectomy bed was flushed copiously with heparinized saline.  The patch was completed and the vessel loops were loosened, starting with the external carotid, followed by the common carotid artery.  After a few seconds, the internal carotid artery was released and cerebral flow was restored. After 2 minutes we saw no EEG changes.  Next, intraoperative duplex ultrasound was performed, which showed widely patent common and internal carotid arteries with appropriate waveforms throughout the carotid system.  The wound was then copiously irrigated with saline. Surgiflo was used for hemostasis.  The platysma was then closed with 3-0 vicryl running suture and the skin was closed with 4-0 monocryl running subcuticular layer.  The incision was dressed with exofin glue.    The patient was allowed to awaken and was extubated. She was moving all four extremities and following commands.  She was then transferred to the PACU for postoperative care.     I was present for the entire procedure.    Patient Disposition:  PACU          SIGNATURE: Krish Galloway MD  DATE: March 19, 2025  TIME: 2:51 PM      [facility] Quality [facility] - Carotid Endarterectomy     Urgency:  Elective    Anesthesia: General Type: Conventional    Side: left    Patch Type: Bovine Pericardial     If Prosthetic, Patch : Shawn    Shunt: No    Skin Prep: Chlorhexidine    Drain: no  Heparin: yes    Protamine: yes      Dextran: no     Re-explore artery after closure: no    Total Procedure time: 159  min    Monitoring:   EEG: yes   Stump Pressure: no   Other: no    Completion Study:   Doppler: yes   Duplex: yes   Arteriogram: no    Concomitant Procedure:   Proximal Endovascular: no   Distal Endovascular: no   CABG: no   Other Arterial Op: no

## 2025-03-20 ENCOUNTER — DOCUMENTATION (OUTPATIENT)
Dept: VASCULAR SURGERY | Facility: CLINIC | Age: 70
End: 2025-03-20

## 2025-03-20 VITALS
TEMPERATURE: 97.2 F | HEIGHT: 68 IN | HEART RATE: 70 BPM | SYSTOLIC BLOOD PRESSURE: 145 MMHG | DIASTOLIC BLOOD PRESSURE: 66 MMHG | RESPIRATION RATE: 25 BRPM | OXYGEN SATURATION: 99 % | BODY MASS INDEX: 27.23 KG/M2 | WEIGHT: 179.68 LBS

## 2025-03-20 LAB
ANION GAP SERPL CALCULATED.3IONS-SCNC: 9 MMOL/L (ref 4–13)
BUN SERPL-MCNC: 18 MG/DL (ref 5–25)
CA-I BLD-SCNC: 1.17 MMOL/L (ref 1.12–1.32)
CALCIUM SERPL-MCNC: 9.8 MG/DL (ref 8.4–10.2)
CHLORIDE SERPL-SCNC: 104 MMOL/L (ref 96–108)
CO2 SERPL-SCNC: 27 MMOL/L (ref 21–32)
CREAT SERPL-MCNC: 0.81 MG/DL (ref 0.6–1.3)
ERYTHROCYTE [DISTWIDTH] IN BLOOD BY AUTOMATED COUNT: 14.5 % (ref 11.6–15.1)
GFR SERPL CREATININE-BSD FRML MDRD: 73 ML/MIN/1.73SQ M
GLUCOSE SERPL-MCNC: 164 MG/DL (ref 65–140)
HCT VFR BLD AUTO: 39.8 % (ref 34.8–46.1)
HGB BLD-MCNC: 12.6 G/DL (ref 11.5–15.4)
MAGNESIUM SERPL-MCNC: 1.9 MG/DL (ref 1.9–2.7)
MCH RBC QN AUTO: 28.3 PG (ref 26.8–34.3)
MCHC RBC AUTO-ENTMCNC: 31.7 G/DL (ref 31.4–37.4)
MCV RBC AUTO: 89 FL (ref 82–98)
PHOSPHATE SERPL-MCNC: 3.7 MG/DL (ref 2.3–4.1)
PLATELET # BLD AUTO: 247 THOUSANDS/UL (ref 149–390)
PMV BLD AUTO: 10.3 FL (ref 8.9–12.7)
POTASSIUM SERPL-SCNC: 4.3 MMOL/L (ref 3.5–5.3)
RBC # BLD AUTO: 4.45 MILLION/UL (ref 3.81–5.12)
SODIUM SERPL-SCNC: 140 MMOL/L (ref 135–147)
WBC # BLD AUTO: 11.42 THOUSAND/UL (ref 4.31–10.16)

## 2025-03-20 PROCEDURE — 84100 ASSAY OF PHOSPHORUS: CPT

## 2025-03-20 PROCEDURE — NC001 PR NO CHARGE: Performed by: STUDENT IN AN ORGANIZED HEALTH CARE EDUCATION/TRAINING PROGRAM

## 2025-03-20 PROCEDURE — 80048 BASIC METABOLIC PNL TOTAL CA: CPT | Performed by: SURGERY

## 2025-03-20 PROCEDURE — 99024 POSTOP FOLLOW-UP VISIT: CPT | Performed by: NURSE PRACTITIONER

## 2025-03-20 PROCEDURE — 82330 ASSAY OF CALCIUM: CPT

## 2025-03-20 PROCEDURE — 85027 COMPLETE CBC AUTOMATED: CPT | Performed by: SURGERY

## 2025-03-20 PROCEDURE — 83735 ASSAY OF MAGNESIUM: CPT

## 2025-03-20 RX ORDER — ACETAMINOPHEN 325 MG/1
975 TABLET ORAL EVERY 6 HOURS PRN
Qty: 30 TABLET | Refills: 0 | Status: SHIPPED | OUTPATIENT
Start: 2025-03-20

## 2025-03-20 RX ORDER — OXYCODONE HYDROCHLORIDE 5 MG/1
5 TABLET ORAL EVERY 6 HOURS PRN
Qty: 10 TABLET | Refills: 0 | Status: SHIPPED | OUTPATIENT
Start: 2025-03-20 | End: 2025-03-30

## 2025-03-20 RX ORDER — MAGNESIUM SULFATE HEPTAHYDRATE 40 MG/ML
2 INJECTION, SOLUTION INTRAVENOUS ONCE
Status: COMPLETED | OUTPATIENT
Start: 2025-03-20 | End: 2025-03-20

## 2025-03-20 RX ADMIN — SENNOSIDES 8.6 MG: 8.6 TABLET, FILM COATED ORAL at 08:31

## 2025-03-20 RX ADMIN — ACETAMINOPHEN 975 MG: 325 TABLET, FILM COATED ORAL at 00:12

## 2025-03-20 RX ADMIN — DOCUSATE SODIUM 100 MG: 100 CAPSULE, LIQUID FILLED ORAL at 08:31

## 2025-03-20 RX ADMIN — HEPARIN SODIUM 5000 UNITS: 5000 INJECTION INTRAVENOUS; SUBCUTANEOUS at 06:51

## 2025-03-20 RX ADMIN — ASPIRIN 81 MG: 81 TABLET, CHEWABLE ORAL at 08:31

## 2025-03-20 RX ADMIN — MAGNESIUM SULFATE HEPTAHYDRATE 2 G: 40 INJECTION, SOLUTION INTRAVENOUS at 06:51

## 2025-03-20 RX ADMIN — OXYCODONE 5 MG: 5 TABLET ORAL at 07:21

## 2025-03-20 NOTE — ASSESSMENT & PLAN NOTE
69 y.o. female with a PMH of HTN, HLD, SAH who was hospitalized in May 2024 for R hemispheric stroke and known severe L carotid artery stenosis.  Originally scheduled for left carotid endarterectomy after her last visit in August however patient had recurrent sinus infection as well as COVID with subsequent sinus surgery.  Patient since doing well. Patient presenting for scheduled left carotid endarterectomy.     S/p L CEA with bovine pericardial patch on 3/19    Plan  Regular diet  As needed pain control  Close hemodynamic monitoring with goal SBP between 100-160  discontinue A-line today  Monitor neuro status  Continue ASA and statin  DVT PPx: SQH  Encourage OOB/ambulation  Encourage I-S  Remainder of care per ICU team  Anticipate discharge in next 24 to 48 hours

## 2025-03-20 NOTE — DISCHARGE INSTR - AVS FIRST PAGE
DISCHARGE INSTRUCTIONS  CAROTID ENDARTERECTOMY    ACTIVITY:  Limit your physical activity to walking for the first week and then increase your activity as tolerated.  Walking up steps and normal activities may be resumed as you feel ready.  Avoid strenuous activity such as vigorous exercise.  Avoid heavy lifting (do not lift more than 15 pounds) for the first four weeks after surgery.  You should not drive a car for at least one week following discharge from the hospital and you are off all narcotic pain medication.  You may ride in a car.  If you have had a stroke or mini-stroke, please consult with your neurologist prior to driving.    DO NOT START OR RESUME ANY PREVIOUSLY PLANNED THERAPY (PHYSICAL, CARDIAC, REHAB, ETC…) UNTIL YOU DISCUSS WITH YOUR SURGEON AND THEY FEEL IT IS SAFE TO ENGAGE IN THERAPY.    DIET:  Resume your normal diet.  Good nutrition is important for healing of your incision.  You can expect to have a sore throat and trouble swallowing after surgery which should improve quickly.  If you feel like you are choking, please call your doctor.    RECURRENT SYMPTOMS: If you develop any new numbness, weakness, vision changes, drooping of the face, or difficulty with speech after discharge, CALL 911 or go to the nearest Emergency department immediately.    INCISION SITE:  You may have surgical glue at your incision site.  There are stitches present under the skin which will absorb on their own.  The glue is used to cover the incision, assist in closure, and prevent contamination. This adhesive will darken and peel away on its own within one to two weeks. Do not pick at it.  If you have a bandage, please remove this on the second day after surgery.    You should shower daily.  Wash incision daily with soap and water, but do not rub or scrub the incision; rinse thoroughly and pat dry.  Do not bathe in a tub or swim for the first 4 weeks following surgery or if you have any open wounds.  It is normal to  have some bruising, swelling or discoloration around the incision.  IF increasing redness, pain, bleeding or a bulge develops, call our office immediately.    If you notice any active bleeding at the site, apply pressure to the site and call our office ([phone]) or [phone].    FOLLOW UP STUDIES: Doppler ultrasound studies are very important to your post-operative care. Your surgeon will arrange them at your first postoperative visit. The first study will be 3 months after surgery.    FOLLOW UP APPOINTMENTS:  Making and keeping follow up appointments and ultrasound tests are important to your recovery.  If you have difficulty making it to or keeping your follow up appointments, call the office.    If you have increased pain, fever >101.5, increased drainage, redness or a bad smell at your surgery site, new coldness/numbness of your arm or leg, please call us immediately and GO directly to the ER.    Follow up appointment scheduled for 3/26/25 with Raquel GONCALVES at 10:30AM at the [facility].    PLEASE CALL THE OFFICE IF YOU HAVE ANY QUESTIONS  [phone]  [phone] [address], [address], [place] [address]  [address] [place] [address]  [address] [place] [address]  [address] [facility], [place] [address]  [address] [facility], [address], [place] [address]  [address] [facility], [address], [place] [address]  [address] [facility], [facility] [address], 2nd Floor, [place] [address]  [address] [facility], 1st Floor, [address], [place] [address]  [address] [facility], [facility] [address], [place] [address]  [address] [facility], [address], [place] [address]

## 2025-03-20 NOTE — PLAN OF CARE
Problem: PAIN - ADULT  Goal: Verbalizes/displays adequate comfort level or baseline comfort level  Description: Interventions:  - Encourage patient to monitor pain and request assistance  - Assess pain using appropriate pain scale  - Administer analgesics based on type and severity of pain and evaluate response  - Implement non-pharmacological measures as appropriate and evaluate response  - Consider cultural and social influences on pain and pain management  - Notify physician/advanced practitioner if interventions unsuccessful or patient reports new pain  Outcome: Progressing     Problem: INFECTION - ADULT  Goal: Absence or prevention of progression during hospitalization  Description: INTERVENTIONS:  - Assess and monitor for signs and symptoms of infection  - Monitor lab/diagnostic results  - Monitor all insertion sites, i.e. indwelling lines, tubes, and drains  - Monitor endotracheal if appropriate and nasal secretions for changes in amount and color  - [place] appropriate cooling/warming therapies per order  - Administer medications as ordered  - Instruct and encourage patient and family to use good hand hygiene technique  - Identify and instruct in appropriate isolation precautions for identified infection/condition  Outcome: Progressing  Goal: Absence of fever/infection during neutropenic period  Description: INTERVENTIONS:  - Monitor WBC    Outcome: Progressing     Problem: SAFETY ADULT  Goal: Patient will remain free of falls  Description: INTERVENTIONS:  - Educate patient/family on patient safety including physical limitations  - Instruct patient to call for assistance with activity   - Consult OT/PT to assist with strengthening/mobility   - Keep Call bell within reach  - Keep bed low and locked with side rails adjusted as appropriate  - Keep care items and personal belongings within reach  - Initiate and maintain comfort rounds  - Make Fall Risk Sign visible to staff  - Offer Toileting every  Hours, in  advance of need  - Initiate/Maintain alarm  - Obtain necessary fall risk management equipment:   - Apply yellow socks and bracelet for high fall risk patients  - Consider moving patient to room near nurses station  Outcome: Progressing  Goal: Maintain or return to baseline ADL function  Description: INTERVENTIONS:  -  Assess patient's ability to carry out ADLs; assess patient's baseline for ADL function and identify physical deficits which impact ability to perform ADLs (bathing, care of mouth/teeth, toileting, grooming, dressing, etc.)  - Assess/evaluate cause of self-care deficits   - Assess range of motion  - Assess patient's mobility; develop plan if impaired  - Assess patient's need for assistive devices and provide as appropriate  - Encourage maximum independence but intervene and supervise when necessary  - Involve family in performance of ADLs  - Assess for home care needs following discharge   - Consider OT consult to assist with ADL evaluation and planning for discharge  - Provide patient education as appropriate  Outcome: Progressing  Goal: Maintains/Returns to pre admission functional level  Description: INTERVENTIONS:  - Perform AM-PAC 6 Click Basic Mobility/ Daily Activity assessment daily.  - Set and communicate daily mobility goal to care team and patient/family/caregiver.   - Collaborate with rehabilitation services on mobility goals if consulted  - Perform Range of Motion  times a day.  - Reposition patient every  hours.  - Dangle patient  times a day  - Stand patient times a day  - Ambulate patient  times a day  - Out of bed to chair  times a day   - Out of bed for meals  times a day  - Out of bed for toileting  - Record patient progress and toleration of activity level   Outcome: Progressing     Problem: DISCHARGE PLANNING  Goal: Discharge to home or other facility with appropriate resources  Description: INTERVENTIONS:  - Identify barriers to discharge w/patient and caregiver  - Arrange for  needed discharge resources and transportation as appropriate  - Identify discharge learning needs (meds, wound care, etc.)  - Arrange for interpretive services to assist at discharge as needed  - Refer to Case Management Department for coordinating discharge planning if the patient needs post-hospital services based on physician/advanced practitioner order or complex needs related to functional status, cognitive ability, or social support system  Outcome: Progressing     Problem: Knowledge Deficit  Goal: Patient/family/caregiver demonstrates understanding of disease process, treatment plan, medications, and discharge instructions  Description: Complete learning assessment and assess knowledge base.  Interventions:  - Provide teaching at level of understanding  - Provide teaching via preferred learning methods  Outcome: Progressing

## 2025-03-20 NOTE — NURSING NOTE
AVS reviewed with pt, no comments/concerns/questions at this time. IV x2 removed. Pt wheeled out via wheelchair by RN and sister at bedside.

## 2025-03-20 NOTE — PROGRESS NOTES
Vascular Nurse Navigator Post Op Education    Met with patient at bedside to introduce myself as Vascular Nurse Navigator and explained my role.  Patient is appropriate and accepting to education. Patient was educated with Review of written materials provided, Teachback, Explanation, Demonstration, and Question & Answer on expectations of post op care and recovery on Left CEA. Patient is a former smoker, quit 9 years ago, as such Smoking effects on the lungs, tobacco triggers, and Smoking cessation was reviewed. Education provided to patient on infection prevention, activity limitations, when to call the office, importance of follow up, and incisional care.  Discharge instruction handout provided to patient to review.

## 2025-03-20 NOTE — UTILIZATION REVIEW
Initial Clinical Review    Elective   IP   surgical procedure    Age/Sex: 69 y.o. female    Surgery Date:   3/19/25    Procedure: Left - LEFT CAROTID ENDARTERECTOMY       Anesthesia:  general    Operative Findings: Left carotid endarterectomy with bovine pericardial patch       POD#1 Progress Note:   3/20  Continue post op care.  Continue pain  control as needed.   States pain  controlled. D/C  A line  today.   Monitor neuro status.  Hemoglobin 12.6  this am.    Admission Orders: Date/Time/Statement:   Admission Orders (From admission, onward)       Ordered        03/19/25 1451  Inpatient Admission  Once                          Orders Placed This Encounter   Procedures    Inpatient Admission     Standing Status:   Standing     Number of Occurrences:   1     Level of Care:   Critical Care [15]     Estimated length of stay:   Inpatient Only Surgery     Diet:  regular    Mobility:   OOB  3 times daily    DVT Prophylaxis:  SCD's    Medications/Pain Control:   Scheduled Medications:  aspirin, 81 mg, Oral, Daily  atorvastatin, 40 mg, Oral, QPM  docusate sodium, 100 mg, Oral, BID  heparin (porcine), 5,000 Units, Subcutaneous, Q8H SALLIE  senna, 1 tablet, Oral, Daily      Continuous IV Infusions:  niCARdipine, 1-15 mg/hr, Intravenous, Continuous PRN  [START ON 3/21/2025] phenylephine,  mcg/min, Intravenous, Continuous PRN      PRN Meds:  acetaminophen, 975 mg, Oral, Q6H PRN  labetalol, 5 mg, Intravenous, Q15 Min PRN   And  hydrALAZINE, 15 mg, Intravenous, Q15 Min PRN   And  niCARdipine, 1-15 mg/hr, Intravenous, Continuous PRN  oxyCODONE, 5 mg, Oral, Q4H PRN  sodium chloride, 500 mL, Intravenous, Once PRN   Followed by  [START ON 3/21/2025] phenylephine,  mcg/min, Intravenous, Continuous PRN      Vital Signs (last 3 days)       Date/Time Temp Pulse Resp BP MAP (mmHg) Arterial Line BP MAP SpO2 O2 Flow Rate (L/min) O2 Device Patient Position - Orthostatic VS Trev Coma Scale Score Pain    03/20/25 0930 -- 70 35  124/63 88 -- -- 99 % -- -- -- -- --    03/20/25 0900 -- 70 28 123/64 87 -- -- 98 % -- -- -- 15 --    03/20/25 0830 -- 80 38 156/67 99 -- -- 99 % -- -- -- -- --    03/20/25 0800 -- 74 31 152/66 93 -- -- 98 % -- None (Room air) Lying 15 --    03/20/25 0744 97.2 °F (36.2 °C) -- -- -- -- -- -- -- -- -- -- -- --    03/20/25 0721 -- -- -- -- -- -- -- -- -- -- -- -- 7    03/20/25 0715 -- -- -- -- -- -- -- -- -- -- -- 15 --    03/20/25 0700 -- 66 31 147/75 113 -- -- 98 % -- -- -- 15 --    03/20/25 0400 -- -- -- -- -- -- -- -- -- -- -- 15 --    03/20/25 0358 97.7 °F (36.5 °C) -- -- -- -- -- -- -- -- -- -- -- --    03/20/25 0300 -- 56 18 123/60 85 -- -- 90 % -- -- -- -- --    03/20/25 0200 -- 60 20 125/59 85 -- -- 90 % -- -- -- -- --    03/20/25 0100 -- 63 -- 146/69 99 -- -- 90 % -- -- -- -- --    03/20/25 0015 -- 73 28 133/90 98 -- -- 90 % -- -- Lying -- --    03/20/25 0012 -- -- -- -- -- -- -- -- -- -- -- -- 8    03/20/25 0000 -- -- -- -- -- -- -- -- -- -- -- 15 --    03/19/25 2341 97.7 °F (36.5 °C) -- -- -- -- -- -- -- -- -- -- -- --    03/19/25 2202 -- -- -- -- -- -- -- -- -- -- -- -- 8    03/19/25 2015 -- 81 -- 142/65 93 -- -- 92 % -- -- -- -- --    03/19/25 2001 97.7 °F (36.5 °C) -- -- -- -- -- -- -- -- -- -- -- --    03/19/25 2000 -- -- -- -- -- -- -- -- -- -- -- 15 --    03/19/25 1900 -- 85 46 130/59 85 113/101 105 mmHg 90 % -- -- -- -- --    03/19/25 1824 -- 98 30 130/60 86 129/111 118 mmHg 93 % -- -- -- -- --    03/19/25 1800 -- 87 37 -- -- 144/122 133 mmHg 93 % -- -- -- -- --    03/19/25 1730 -- 84 -- -- -- 103/75 87 mmHg 94 % -- -- -- -- 5    03/19/25 1700 -- 87 34 133/69 92 90/61 73 mmHg 96 % -- -- -- -- --    03/19/25 1620 98.2 °F (36.8 °C) 89 20 136/76 100 127/69 94 mmHg 98 % 3 L/min Nasal cannula Lying 15 No Pain    03/19/25 1614 33.8 °F (1 °C) -- -- -- -- -- -- -- -- -- -- -- --    03/19/25 1603 97.3 °F (36.3 °C) 76 20 136/64 92 116/42 70 mmHg 95 % 3 L/min Nasal cannula -- -- No Pain    03/19/25 1548 -- 84 20  139/64 92 120/46 76 mmHg 96 % 3 L/min Nasal cannula -- -- No Pain    03/19/25 1533 -- 82 18 140/63 91 116/46 72 mmHg 95 % 3 L/min Nasal cannula -- -- No Pain    03/19/25 1524 -- 84 18 -- -- 114/44 70 mmHg 94 % 3 L/min Nasal cannula -- -- No Pain    03/19/25 1521 -- 84 18 -- -- 124/56 82 mmHg 89 % -- None (Room air) -- -- No Pain    03/19/25 1516 -- 86 18 141/80 104 132/58 86 mmHg 98 % 6 L/min Simple mask -- -- No Pain    03/19/25 1515 97.7 °F (36.5 °C) 86 18 141/80 104 -- -- 98 % 6 L/min Simple mask -- -- No Pain    03/19/25 1032 -- -- -- -- -- -- -- -- -- -- -- -- No Pain    03/19/25 0957 98 °F (36.7 °C) 67 16 139/67 -- -- -- 99 % -- None (Room air) -- -- No Pain          Weight (last 2 days)       Date/Time Weight    03/19/25 1620 81.5 (179.68)    03/19/25 0957 81.4 (179.45)            Pertinent Labs/Diagnostic Test Results:   Radiology:  VAS intra-op ultrasound CEA    (Results Pending)     Cardiology:          Results from last 7 days   Lab Units 03/20/25  0439 03/19/25  1736   WBC Thousand/uL 11.42*  --    HEMOGLOBIN g/dL 12.6  --    HEMATOCRIT % 39.8  --    PLATELETS Thousands/uL 247 210         Results from last 7 days   Lab Units 03/20/25  0439   SODIUM mmol/L 140   POTASSIUM mmol/L 4.3   CHLORIDE mmol/L 104   CO2 mmol/L 27   ANION GAP mmol/L 9   BUN mg/dL 18   CREATININE mg/dL 0.81   EGFR ml/min/1.73sq m 73   CALCIUM mg/dL 9.8   CALCIUM, IONIZED mmol/L 1.17   MAGNESIUM mg/dL 1.9   PHOSPHORUS mg/dL 3.7             Results from last 7 days   Lab Units 03/20/25  0439   GLUCOSE RANDOM mg/dL 164*                                         Network Utilization Review Department  ATTENTION: Please call with any questions or concerns to [phone] and carefully listen to the prompts so that you are directed to the right person. All voicemails are confidential.   For Discharge needs, contact Care Management DC Support Team at [phone] opt. 2  Send all requests for admission clinical reviews, approved or denied  determinations and any other requests to dedicated fax number below belonging to the campus where the patient is receiving treatment. List of dedicated fax numbers for the Facilities:  FACILITY NAME UR FAX NUMBER   ADMISSION DENIALS (Administrative/Medical Necessity) [phone]   DISCHARGE SUPPORT TEAM (NETWORK) [phone]   PARENT CHILD HEALTH (Maternity/NICU/Pediatrics) [phone]   [facility] [phone]   [facility] [phone]   [facility] [phone]   [facility] [phone]   [facility] [phone]   [facility] [phone]   [facility] [phone]   [facility] [phone]   [facility] [phone]   [facility] [phone]   [facility] [phone]   [facility] [phone]

## 2025-03-20 NOTE — DISCHARGE SUMMARY
Discharge Summary   Sanam Castro 69 y.o. female MRN: [id number]  Unit/Bed#: ICU 15 Encounter: [id number]    Admission Date: 3/19/2025     Discharge Date:03/20/25    Attending:Krish Galloway MD     Consultants:     Admitting Diagnosis: Carotid stenosis, asymptomatic, left [I65.22]    Principle/ Secondary Diagnosis:  Past Medical History:   Diagnosis Date    History of ovarian cyst     Hyperlipidemia     Hypertension     Melanoma of shoulder, right (HCC)     Stroke (HCC) 05/2024    no deficits     Past Surgical History:   Procedure Laterality Date    OVARIAN CYST REMOVAL Left     IA TEAEC W/PATCH GRF CAROTID VERTB SUBCLAV NECK INC Left 3/19/2025    Procedure: LEFT CAROTID ENDARTERECTOMY;  Surgeon: Krish Galloway MD;  Location: [facility];  Service: Vascular    SINUS SURGERY         Procedures Performed: No orders of the defined types were placed in this encounter.    IA TEAEC W/PATCH GRF CAROTID VERTB SUBCLAV NECK INC [[id number]] (LEFT CAROTID ENDARTERECTOMY)  LEFT CAROTID ENDARTERECTOMY (Left: Neck)  Procedure(s):  LEFT CAROTID ENDARTERECTOMY    Laboratory data at discharge:   Results from last 7 days   Lab Units 03/20/25  0439 03/19/25  1736   WBC Thousand/uL 11.42*  --    HEMOGLOBIN g/dL 12.6  --    HEMATOCRIT % 39.8  --    PLATELETS Thousands/uL 247 210     Results from last 7 days   Lab Units 03/20/25  0439   POTASSIUM mmol/L 4.3   CHLORIDE mmol/L 104   CO2 mmol/L 27   BUN mg/dL 18   CREATININE mg/dL 0.81   CALCIUM mg/dL 9.8               Discharge instructions/Information to patient and family:   See after visit summary for information provided to patient and family.     Discharge Medications:  See after visit summary for reconciled discharge medications provided to patient and family.      Hospital Course:   69-year-old female former smoker with past medical history of hypertension, hyperlipidemia, subarachnoid hemorrhage who was hospitalized in May 2024 for a right hemispheric stroke and known  severe left carotid artery stenosis presented to [facility] for elective repair of asymptomatic L ICA.     Patient underwent left carotid endarterectomy with bovine patch angioplasty with Dr. Galloway on 3/19/2025. She tolerated the procedure well. She was transferred to the Intensive Care Unit for overnight blood pressure and neurological monitoring. She had an arterial line placed for more accurate measurements of her blood pressure. POD #0 she was seen and evaluated by the vascular surgery team and reported to be neurologically intact with intact neck incision. She was hemodynamically stable with blood pressure ranging from 120-150/60 mmHg. POD #1 her a-line was removed without difficulty. She remained neurologically intact. She is tolerating PO diet without difficulty. She is voiding appropriately. Her incision site is clean, dry and well approximated. She was instructed to utilize acetaminophen for pain control and continue with daily aspirin 81 mg and atorvastatin daily She was provided with oxycodone prescription and instructed to use sparing with severe surgical pain, she verbalized understanding.    Post-op day 1 she was deemed stable and appropriate for discharge and she was discharged on 03/20/25     Prior to discharge, the patient was given all instructions for outpatient care and follow-op. The patient has been instructed to call the office with any questions, changes, or concerns for their medical condition.     For further details for the hospitalization, please refer to the medical record.       Hospital course was complicated by the following:      Prior to discharge, the patient was given instructions for outpatient care and follow-up.  The patient has been instructed to call w/ any questions, changes, or concerns for the medical condition.    For further details of the hospitalization, please refer to the medical record.    Condition at Discharge: good     Provisions for Follow-Up Care:  See after visit  summary for information related to follow-up care and any pertinent home health orders.      Disposition: Home    Planned Readmission: No    Discharge Statement   I spent 15 minutes discharging the patient. This time was spent on the day of discharge. I had direct contact with the patient on the day of discharge. Additional documentation is required if more than 30 minutes were spent on discharge.     SACHA Greco  3/20/2025      This text is generated with voice recognition software. There may be translation, syntax,  or grammatical errors. If you have any questions, please contact the dictating provider.

## 2025-03-20 NOTE — PROGRESS NOTES
Progress Note - Vascular Surgery   Name: Sanam Castro 69 y.o. female I MRN: [id number]  Unit/Bed#: ICU 15 I Date of Admission: 3/19/2025   Date of Service: 3/20/2025 I Hospital Day: 1    Assessment & Plan  Asymptomatic stenosis of left carotid artery  69 y.o. female with a PMH of HTN, HLD, SAH who was hospitalized in May 2024 for R hemispheric stroke and known severe L carotid artery stenosis.  Originally scheduled for left carotid endarterectomy after her last visit in August however patient had recurrent sinus infection as well as COVID with subsequent sinus surgery.  Patient since doing well. Patient presenting for scheduled left carotid endarterectomy.     S/p L CEA with bovine pericardial patch on 3/19    Plan  Regular diet  As needed pain control  Close hemodynamic monitoring with goal SBP between 100-160  discontinue A-line today  Monitor neuro status  Continue ASA and statin  DVT PPx: SQH  Encourage OOB/ambulation  Encourage I-S  Remainder of care per ICU team  Anticipate discharge in next 24 to 48 hours    Please contact the SecureChat role for the Vascular Surgery service with any questions/concerns.    Subjective   NAEON. AVSS. Patient reports pain is controlled with some mild left neck discomfort. Tolerating diet. Voiding spontaneously. Denies fever, chills, nausea, vomiting.  Denies any motor or sensory deficits.    Objective :  Temp:  [33.8 °F (1 °C)-98.2 °F (36.8 °C)] 97.2 °F (36.2 °C)  HR:  [56-98] 66  BP: (123-147)/(59-90) 147/75  Resp:  [16-46] 31  SpO2:  [89 %-99 %] 98 %  O2 Device: Nasal cannula    I/O         03/18 0701  03/19 0700 03/19 0701  03/20 0700    P.O.  0    I.V. (mL/kg)  1975 (24.2)    Total Intake(mL/kg)  1975 (24.2)    Net  +1975          Unmeasured Urine Occurrence  1 x              Physical Exam  General: NAD  HENT: NCAT MMM  Neck: supple, no JVD.  Incision clean, dry, intact.  Soft and compressible.  Mild danyelle-incisional ecchymosis.  CV: nl rate  Lungs: nl wob. No resp  distress  ABD: Soft, nontender, nondistended  Extrem: No CCE.  Strength is station intact in all extremities.  Motor and sensory function intact in facial muscles.  Neuro: AAOx3      Lab Results: I have reviewed the following results:  Recent Labs     03/20/25  0439   WBC 11.42*   HGB 12.6   HCT 39.8      SODIUM 140   K 4.3      CO2 27   BUN 18   CREATININE 0.81   GLUC 164*   CAIONIZED 1.17   MG 1.9   PHOS 3.7       VTE Pharmacologic Prophylaxis: VTE covered by:  heparin (porcine), Subcutaneous, 5,000 Units at 03/20/25 0651      VTE Mechanical Prophylaxis: sequential compression device    Melissa Rangel MD  General Surgery Resident

## 2025-03-21 PROCEDURE — NC001 PR NO CHARGE: Performed by: SURGERY

## 2025-03-21 NOTE — UTILIZATION REVIEW
NOTIFICATION OF ADMISSION DISCHARGE   This is a Notification of Discharge from [facility]. Please be advised that this patient has been discharge from our facility. Below you will find the admission and discharge date and time including the patient’s disposition.   UTILIZATION REVIEW CONTACT:  Adelaida Rivera  Utilization   Network Utilization Review Department  Phone: [phone] x carefully listen to the prompts. All voicemails are confidential.  Email: [email or web address]     ADMISSION INFORMATION  PRESENTATION DATE: 3/19/2025  8:57 AM  OBERVATION ADMISSION DATE: N/A  INPATIENT ADMISSION DATE: 3/19/25  2:51 PM   DISCHARGE DATE: 3/20/2025 11:40 AM   DISPOSITION:Home/Self Care    Network Utilization Review Department  ATTENTION: Please call with any questions or concerns to [phone] and carefully listen to the prompts so that you are directed to the right person. All voicemails are confidential.   For Discharge needs, contact Care Management DC Support Team at [phone] opt. 2  Send all requests for admission clinical reviews, approved or denied determinations and any other requests to dedicated fax number below belonging to the campus where the patient is receiving treatment. List of dedicated fax numbers for the Facilities:  FACILITY NAME UR FAX NUMBER   ADMISSION DENIALS (Administrative/Medical Necessity) [phone]   DISCHARGE SUPPORT TEAM ([facility]) [phone]   PARENT CHILD HEALTH (Maternity/NICU/Pediatrics) [phone]   [facility] [phone]   [facility] [phone]   [facility] [phone]   [facility] [phone]   [facility] [phone]   [facility] [phone]   [facility] [phone]   [facility] [phone]    [facility] [phone]   [facility] [phone]   [facility] [phone]   [facility] [phone]

## 2025-03-24 ENCOUNTER — TELEPHONE (OUTPATIENT)
Dept: VASCULAR SURGERY | Facility: CLINIC | Age: 70
End: 2025-03-24

## 2025-03-26 ENCOUNTER — OFFICE VISIT (OUTPATIENT)
Dept: VASCULAR SURGERY | Facility: CLINIC | Age: 70
End: 2025-03-26

## 2025-03-26 VITALS
HEART RATE: 69 BPM | BODY MASS INDEX: 26.83 KG/M2 | SYSTOLIC BLOOD PRESSURE: 152 MMHG | HEIGHT: 68 IN | WEIGHT: 177 LBS | DIASTOLIC BLOOD PRESSURE: 90 MMHG | OXYGEN SATURATION: 98 %

## 2025-03-26 DIAGNOSIS — I63.9 CEREBROVASCULAR ACCIDENT (CVA), UNSPECIFIED MECHANISM (HCC): ICD-10-CM

## 2025-03-26 DIAGNOSIS — I65.22 CAROTID STENOSIS, ASYMPTOMATIC, LEFT: Primary | ICD-10-CM

## 2025-03-26 PROCEDURE — 99024 POSTOP FOLLOW-UP VISIT: CPT | Performed by: NURSE PRACTITIONER

## 2025-03-26 RX ORDER — IBUPROFEN 600 MG/1
600 TABLET, FILM COATED ORAL EVERY 6 HOURS PRN
COMMUNITY
Start: 2024-12-06 | End: 2025-12-06

## 2025-03-26 NOTE — ASSESSMENT & PLAN NOTE
S/P L CEA with  on 3/19/25    -Denies symptoms of numbness/ tingling/ weakness on one side of the body, facial droop, slurred speech or blindness in one eye.   -Presents to the office w/o complaints. Has not required use of opioid/ prescribed pain medication.   -L carotid incision site is clean, dry and well approximated with surgical glue in place.   -No drainage, no erythremia, denies fever/ chills.  -Increasing physical activity daily.   -Discussed post-op incision care and post-op restrictions. Educated on post-op follow up ultrasound surveillance and monitoring. All questions answered.     Plan  -Complete carotid ultrasound in 3 months and return to the office for review with your vascular surgeon.  -Continue aspirin 81mg daily.   -Continue Statin daily as prescribed by PCP.  -Call 911/ Go to the ER with any S/S of TIA/ CVA.  -Call the office with any surgical site swelling, discoloration, fever/ chills.   -CC      Orders:    VAS carotid complete study; Future

## 2025-03-26 NOTE — PROGRESS NOTES
Name: Sanam Castro      : 1955      MRN: [id number]  Encounter Provider: SACHA Greco  Encounter Date: 3/26/2025   Enc[facility] department: [facility]    69-year-old female former smoker with past medical history of hypertension, hyperlipidemia, subarachnoid hemorrhage who was hospitalized in May 2024 for a right hemispheric stroke and known severe left carotid artery stenosis now S/P asymptomatic L CEA 3/19/2025 with .   Assessment & Plan  Carotid stenosis, asymptomatic, left  S/P L CEA with  on 3/19/25    -Denies symptoms of numbness/ tingling/ weakness on one side of the body, facial droop, slurred speech or blindness in one eye.   -Presents to the office w/o complaints. Has not required use of opioid/ prescribed pain medication.   -L carotid incision site is clean, dry and well approximated with surgical glue in place.   -No drainage, no erythremia, denies fever/ chills.  -Increasing physical activity daily.   -Discussed post-op incision care and post-op restrictions. Educated on post-op follow up ultrasound surveillance and monitoring. All questions answered.     Plan  -Complete carotid ultrasound in 3 months and return to the office for review with your vascular surgeon.  -Continue aspirin 81mg daily.   -Continue Statin daily as prescribed by PCP.  -Call 911/ Go to the ER with any S/S of TIA/ CVA.  -Call the office with any surgical site swelling, discoloration, fever/ chills.   -CC      Orders:    VAS carotid complete study; Future    Cerebrovascular accident (CVA), unspecified mechanism (HCC)    Orders:    VAS carotid complete study; Future      History of Present Illness   HPI  Sanam Castro is a 69 y.o. female who presents as a former smoker with past medical history of hypertension, hyperlipidemia, subarachnoid hemorrhage who was hospitalized in May 2024 for a right hemispheric stroke and known severe left carotid artery stenosis  now S/P asymptomatic L CEA 3/19/2025 with .   Presents to the office with her family and grandson. States she is doing well. She last took oxycodone on Saturday and her pain has been controlled since then, she reports cleansing her incision site daily without issue.   She is now taking tylenol without pain.   She does take her blood pressure at home, 130-145/70.   -Re-checked BP in the office at the end of the visit 144/90 R arm. Discussed pt should f/u with PCP for further anti-hypertensive medications. She is denying any chest pain, shortness or breath or headaches.  -She is compliant with her ASA and statin medication.   -She is concerned about a potential periodontist intervention in the future, we discussed no contraindication from vascular standpoint to undergo any tooth/ gum intervention as long as she does not need to stop her aspirin therapy. She verbalized understanding.     History obtained from: patient    Review of Systems   Constitutional: Negative.    HENT: Negative.     Eyes: Negative.    Respiratory: Negative.     Cardiovascular: Negative.    Gastrointestinal: Negative.    Endocrine: Negative.    Genitourinary: Negative.    Musculoskeletal: Negative.    Skin: Negative.    Allergic/Immunologic: Negative.    Neurological: Negative.    Hematological: Negative.    Psychiatric/Behavioral: Negative.       Current Outpatient Medications on File Prior to Visit   Medication Sig Dispense Refill    acetaminophen (TYLENOL) 325 mg tablet Take 3 tablets (975 mg total) by mouth every 6 (six) hours as needed for mild pain 30 tablet 0    amLODIPine-benazepril (LOTREL 5-10) 5-10 MG per capsule Take 1 capsule by mouth daily 30 capsule 0    aspirin 81 mg chewable tablet Chew 1 tablet (81 mg total) daily 30 tablet 0    atorvastatin (LIPITOR) 40 mg tablet Take 1 tablet (40 mg total) by mouth every evening 30 tablet 0    Azelastine HCl 137 MCG/SPRAY SOLN SPRAY 1-2 SPRAYS INTO EACH NOSTRIL TWICE A DAY AS DIRECTED   "    Calcium Carb-Cholecalciferol (Caltrate 600+D3) 600-800 MG-UNIT TABS Take 1 tablet by mouth in the morning      cetirizine (ZyrTEC Allergy) 10 mg tablet Take 10 mg by mouth daily      ibuprofen (MOTRIN) 600 mg tablet Take 600 mg by mouth every 6 (six) hours as needed      Multiple Vitamins-Minerals (Centrum Silver 50+Women) TABS Take 1 tablet by mouth daily      NON FORMULARY Momotasone 1MG      oxyCODONE (ROXICODONE) 5 immediate release tablet Take 1 tablet (5 mg total) by mouth every 6 (six) hours as needed for moderate pain (Take for severe incisional pain) for up to 10 days Max Daily Amount: 20 mg 10 tablet 0    fluticasone (FLONASE) 50 mcg/act nasal spray 2 sprays into each nostril daily (Patient not taking: Reported on 3/19/2025)      [DISCONTINUED] magnesium citrate (CITROMA) 1.745 g/30 mL oral solution Take 148 mL by mouth once as needed (Constipation) for up to 1 dose (Patient not taking: Reported on 3/13/2025) 296 mL 0    [DISCONTINUED] Melatonin 10 MG TABS Take by mouth daily at bedtime (Patient not taking: Reported on 3/13/2025)      [DISCONTINUED] polyethylene glycol (MIRALAX) 17 g packet Take 17 g by mouth daily as needed (Constipation) (Patient not taking: Reported on 3/13/2025) 10 each 0     No current facility-administered medications on file prior to visit.         Objective   /90   Pulse 69   Ht 5' 8\" (1.727 m)   Wt 80.3 kg (177 lb)   SpO2 98%   BMI 26.91 kg/m²      Physical Exam  Vitals reviewed.   Constitutional:       General: She is not in acute distress.     Appearance: Normal appearance. She is not ill-appearing.   HENT:      Head: Normocephalic and atraumatic.   Cardiovascular:      Rate and Rhythm: Normal rate and regular rhythm.      Heart sounds: Normal heart sounds. No murmur heard.  Pulmonary:      Effort: Pulmonary effort is normal. No respiratory distress.      Breath sounds: Normal breath sounds.   Musculoskeletal:      Cervical back: Normal range of motion and neck " supple. No tenderness.   Skin:     General: Skin is warm and dry.      Findings: Bruising (b/l arms) present.      Comments: L neck incision is clean, dry and well approximated   Neurological:      General: No focal deficit present.      Mental Status: She is alert. She is disoriented.      Motor: No weakness.      Gait: Gait normal.      Comments: Equal arm strength bilaterally  Symmetrical smile with midline tongue      Psychiatric:         Mood and Affect: Mood normal.         Behavior: Behavior normal.         Administrative Statements   I have spent a total time of 20 minutes in caring for this patient on the day of the visit/encounter including Instructions for management, Patient and family education, Risk factor reductions, Documenting in the medical record, and Obtaining or reviewing history  .

## 2025-03-26 NOTE — PATIENT INSTRUCTIONS
-Complete carotid ultrasound in indicated time and return to the office for review if instructed.    -Go to the emergency department/ Call 911 with any signs or symptoms of a stroke: numbness/ tingling/ weakness on one side of the body, facial droop, slurred speech or blindness in one eye.     -Continue to take Aspirin 81 mg daily as per your family doctor.     -Continue to take statin medication as per your family doctor daily.    -Call our office with any question or concerns.

## 2025-05-01 ENCOUNTER — TELEPHONE (OUTPATIENT)
Dept: NEUROSURGERY | Facility: CLINIC | Age: 70
End: 2025-05-01

## 2025-05-09 NOTE — H&P ADULT - NSICDXPASTSURGICALHX_GEN_ALL_CORE_FT
Fax received from [facility] stating that patient's Xolair PFS 75 mg/0.5 mL.  The prescription should be referred to [facility].        PAST SURGICAL HISTORY:  H/O ovarian cystectomy     S/P excision of lipoma R shoulder

## 2025-05-28 ENCOUNTER — HOSPITAL ENCOUNTER (OUTPATIENT)
Dept: MRI IMAGING | Facility: HOSPITAL | Age: 70
Discharge: HOME | End: 2025-05-28
Payer: COMMERCIAL

## 2025-05-28 DIAGNOSIS — D32.9 MENINGIOMA (HCC): ICD-10-CM

## 2025-05-28 PROCEDURE — 70553 MRI BRAIN STEM W/O & W/DYE: CPT

## 2025-05-28 PROCEDURE — A9585 GADOBUTROL INJECTION: HCPCS | Performed by: PHYSICIAN ASSISTANT

## 2025-05-28 RX ORDER — GADOBUTROL 604.72 MG/ML
7 INJECTION INTRAVENOUS
Status: COMPLETED | OUTPATIENT
Start: 2025-05-28 | End: 2025-05-28

## 2025-05-28 RX ADMIN — GADOBUTROL 7 ML: 604.72 INJECTION INTRAVENOUS at 09:24

## 2025-05-30 ENCOUNTER — RESULTS FOLLOW-UP (OUTPATIENT)
Age: 70
End: 2025-05-30

## 2025-06-16 ENCOUNTER — OFFICE VISIT (OUTPATIENT)
Dept: NEUROSURGERY | Facility: CLINIC | Age: 70
End: 2025-06-16
Payer: COMMERCIAL

## 2025-06-16 VITALS
BODY MASS INDEX: 26.83 KG/M2 | DIASTOLIC BLOOD PRESSURE: 68 MMHG | TEMPERATURE: 98 F | HEART RATE: 63 BPM | HEIGHT: 68 IN | OXYGEN SATURATION: 97 % | SYSTOLIC BLOOD PRESSURE: 122 MMHG | WEIGHT: 177 LBS | RESPIRATION RATE: 18 BRPM

## 2025-06-16 DIAGNOSIS — D32.9 MENINGIOMA (HCC): Primary | ICD-10-CM

## 2025-06-16 PROCEDURE — 99215 OFFICE O/P EST HI 40 MIN: CPT | Performed by: PHYSICIAN ASSISTANT

## 2025-06-16 RX ORDER — FLUTICASONE PROPIONATE AND SALMETEROL 50; 250 UG/1; UG/1
1 POWDER RESPIRATORY (INHALATION) 2 TIMES DAILY
COMMUNITY
Start: 2025-06-11

## 2025-06-16 RX ORDER — MONTELUKAST SODIUM 10 MG/1
10 TABLET ORAL
COMMUNITY
Start: 2025-06-09 | End: 2025-07-09

## 2025-06-16 NOTE — PROGRESS NOTES
Name: Sanam Castro      : 1955      MRN: [id number]  Encounter Provider: Adrianna Victor PA-C  Encounter Date: 2025   Encounter [facility]  :  Assessment & Plan  Meningioma (HCC)  Patient presents today for 1 year follow up for a left frontal partially calcified mass consistent with meningioma.  This was incidentally noted in May 2024 during stroke workup with symptoms of gait instability and dis-coordination.    Imaging personally reviewed:   MRI brain with without contrast 2025: Unchanged 2.4 cm probable calcified meningioma along the left frontoparietal vertex with mild mass effect on the adjacent left perirolandic region.  Chronic lacunar infarcts in the right thalamus with moderate chronic microangiopathy.     Plan:   Again reviewed with patient the Natural history of meningiomas, Again discussed given calcification this was likely present for a prolonged duration and unlikely to change significantly or become symptomatic.  We will proceed with ongoing surveillance with a repeat MRI brain with and without contrast in 1 year per NCCN guidelines.  Labs ordered to be completed prior to imaging.  Patient will have outpatient follow-up with [facility] after completion of MRI brain in 1 year.  She is aware to call the office with any questions concerns or new symptoms prior to this.    Orders:    MRI brain with and without contrast; Future    BUN; Future    Creatine, Serum; Future        History of Present Illness     Sanam Castro is a 69 y.o. female   with past medical history significant for melanoma of right shoulder, hx of  right thalamic stroke as well as severe left ICA stenosis who presents for 1 year follow up for presumed meningioma. This was noted incidentally during stroke workup in May 2024 when she presented with acute gait imbalance and dis-coordination.     Of note, patient sustained a fall down steps in 2022 when visiting her son  in [place].  She was seen at a local hospital and underwent a CT head for which she was told did not show any findings.  She then was seen by a neurologist around that time through [facility] and did not require any additional imaging.  Patient states she was not told about a meningioma or calcified mass from the hospital in [place] where the CT scan was completed.  Retrospectively reviewed, this mass was present in 2022.    Today, patient states she is overall doing well.  She reports that her gait and balance improved with PT. She denies any headaches, vision or speech changes at this time.  Denies any weakness or numbness.  Patient is overall without complaints.    She is accompanied by her daughter and grandson.          Review of Systems   Constitutional: Negative.    HENT: Negative.     Eyes: Negative.    Respiratory: Negative.     Cardiovascular: Negative.    Gastrointestinal: Negative.    Endocrine: Negative.    Genitourinary: Negative.    Musculoskeletal: Negative.    Skin: Negative.    Allergic/Immunologic: Negative.    Neurological:  Negative for dizziness, speech difficulty, weakness, numbness and headaches.   Hematological: Negative.    Psychiatric/Behavioral: Negative.       I have personally reviewed the MA's review of systems and made changes as necessary.    Past Medical History   Past Medical History[1]  Past Surgical History[2]  Family History[3]  she reports that she quit smoking about 8 years ago. Her smoking use included cigarettes. She started smoking about 40 years ago. She has a 15.8 pack-year smoking history. She has never used smokeless tobacco. She reports that she does not currently use alcohol. She reports that she does not currently use drugs.  Current Outpatient Medications   Medication Instructions    acetaminophen (TYLENOL) 975 mg, Oral, Every 6 hours PRN    Advair Diskus 250-50 MCG/ACT inhaler 1 puff, 2 times daily    amLODIPine-benazepril (LOTREL 5-10) 5-10 MG  "per capsule 1 capsule, Oral, Daily    aspirin 81 mg, Oral, Daily    atorvastatin (LIPITOR) 40 mg, Oral, Every evening    Azelastine HCl 137 MCG/SPRAY SOLN     Calcium Carb-Cholecalciferol (Caltrate 600+D3) 600-800 MG-UNIT TABS 1 tablet, Daily    cetirizine (ZYRTEC ALLERGY) 10 mg, Daily    fluticasone (FLONASE) 50 mcg/act nasal spray 2 sprays, Daily    ibuprofen (MOTRIN) 600 mg, Every 6 hours PRN    montelukast (SINGULAIR) 10 mg    Multiple Vitamins-Minerals (Centrum Silver 50+Women) TABS 1 tablet, Daily    NON FORMULARY Momotasone 1MG   Allergies[4]   Objective   Resp 18   Ht 5' 8\" (1.727 m)   Wt 80.3 kg (177 lb)   BMI 26.91 kg/m²     Physical Exam  Constitutional:       General: She is not in acute distress.     Appearance: She is well-developed.   HENT:      Head: Normocephalic and atraumatic.     Eyes:      Extraocular Movements: Extraocular movements intact.      Pupils: Pupils are equal, round, and reactive to light.     Neck:      Trachea: No tracheal deviation.     Cardiovascular:      Rate and Rhythm: Normal rate.   Pulmonary:      Effort: Pulmonary effort is normal.   Abdominal:      Palpations: Abdomen is soft.      Tenderness: There is no abdominal tenderness. There is no guarding.     Musculoskeletal:      Cervical back: Normal range of motion and neck supple.     Skin:     General: Skin is warm and dry.      Coloration: Skin is not pale.     Neurological:      Mental Status: She is alert and oriented to person, place, and time.      Comments: GCS 15, Awake, Alert, Oriented x 3    Motor: PATINO, strength 5/5 throughout.    Sensation:  intact to LT X 4     Reflexes: 2+ and symmetric, no bell's or clonus     Coordination: no drift bilateral upper extremities, finger to nose normal bilaterally.    Psychiatric:         Behavior: Behavior normal.       Neurological Exam  Mental Status  Alert. Oriented to person, place, and time.    Cranial Nerves  CN III, IV, VI: Extraocular movements intact bilaterally. " Pupils equal round and reactive to light bilaterally.  GCS 15, Awake, Alert, Oriented x 3    Motor: PATINO, strength 5/5 throughout.    Sensation:  intact to LT X 4     Reflexes: 2+ and symmetric, no bell's or clonus     Coordination: no drift bilateral upper extremities, finger to nose normal bilaterally. .        Administrative Statements   I have spent a total time of 40 minutes in caring for this patient on the day of the visit/encounter including Diagnostic results, Risks and benefits of tx options, Instructions for management, Patient and family education, Importance of tx compliance, Risk factor reductions, Impressions, Counseling / Coordination of care, Documenting in the medical record, Reviewing / ordering tests, medicine, procedures  , Obtaining or reviewing history  , and Communicating with other healthcare professionals .              [1]   Past Medical History:  Diagnosis Date    History of ovarian cyst     Hyperlipidemia     Hypertension     Melanoma of shoulder, right (HCC)     Stroke (HCC) 05/2024    no deficits   [2]   Past Surgical History:  Procedure Laterality Date    OVARIAN CYST REMOVAL Left     WA TEAEC W/PATCH GRF CAROTID VERTB SUBCLAV NECK INC Left 3/19/2025    Procedure: LEFT CAROTID ENDARTERECTOMY;  Surgeon: Krish Galloway MD;  Location: [facility] OR;  Service: Vascular    SINUS SURGERY     [3]   Family History  Problem Relation Name Age of Onset    No Known Problems Mother      Diabetes Father      Heart murmur Father      No Known Problems Son      No Known Problems Son     [4]   Allergies  Allergen Reactions    Pollen Extract Other (See Comments)     seasonal

## 2025-06-16 NOTE — ASSESSMENT & PLAN NOTE
Patient presents today for 1 year follow up for a left frontal partially calcified mass consistent with meningioma.  This was incidentally noted in May 2024 during stroke workup with symptoms of gait instability and dis-coordination.    Imaging personally reviewed:   MRI brain with without contrast 5/28/2025: Unchanged 2.4 cm probable calcified meningioma along the left frontoparietal vertex with mild mass effect on the adjacent left perirolandic region.  Chronic lacunar infarcts in the right thalamus with moderate chronic microangiopathy.     Plan:   Again reviewed with patient the Natural history of meningiomas, Again discussed given calcification this was likely present for a prolonged duration and unlikely to change significantly or become symptomatic.  We will proceed with ongoing surveillance with a repeat MRI brain with and without contrast in 1 year per NCCN guidelines.  Labs ordered to be completed prior to imaging.  Patient will have outpatient follow-up with [facility] after completion of MRI brain in 1 year.  She is aware to call the office with any questions concerns or new symptoms prior to this.    Orders:    MRI brain with and without contrast; Future    BUN; Future    Creatine, Serum; Future

## 2025-06-23 ENCOUNTER — HOSPITAL ENCOUNTER (OUTPATIENT)
Dept: NON INVASIVE DIAGNOSTICS | Facility: HOSPITAL | Age: 70
Discharge: HOME | End: 2025-06-23
Payer: COMMERCIAL

## 2025-06-23 DIAGNOSIS — I63.9 CEREBROVASCULAR ACCIDENT (CVA), UNSPECIFIED MECHANISM (HCC): ICD-10-CM

## 2025-06-23 DIAGNOSIS — I65.22 CAROTID STENOSIS, ASYMPTOMATIC, LEFT: ICD-10-CM

## 2025-06-23 PROCEDURE — 93880 EXTRACRANIAL BILAT STUDY: CPT

## 2025-06-24 PROCEDURE — 93880 EXTRACRANIAL BILAT STUDY: CPT | Performed by: SURGERY

## 2025-06-25 ENCOUNTER — TELEPHONE (OUTPATIENT)
Dept: VASCULAR SURGERY | Facility: CLINIC | Age: 70
End: 2025-06-25

## (undated) DEVICE — SURGICEL 4 X 8IN

## (undated) DEVICE — SUT SILK 3-0 30 IN A304H

## (undated) DEVICE — SUT MONOCRYL 4-0 PS-2 27 IN Y426H

## (undated) DEVICE — DRAPE SHEET X-LG

## (undated) DEVICE — SCD SEQUENTIAL COMPRESSION COMFORT SLEEVE MEDIUM KNEE LENGTH: Brand: KENDALL SCD

## (undated) DEVICE — DECANTER: Brand: UNBRANDED

## (undated) DEVICE — BETHL CAROTID ENDARTERECTOMY: Brand: CARDINAL HEALTH

## (undated) DEVICE — REM POLYHESIVE ADULT PATIENT RETURN ELECTRODE: Brand: VALLEYLAB

## (undated) DEVICE — GLOVE SRG BIOGEL 7

## (undated) DEVICE — PETRI DISH STERILE

## (undated) DEVICE — SUT PROLENE 7-0 BV175-6 24 IN M8737

## (undated) DEVICE — 3M™ IOBAN™ 2 ANTIMICROBIAL INCISE DRAPE 6650EZ: Brand: IOBAN™ 2

## (undated) DEVICE — 2000CC GUARDIAN II: Brand: GUARDIAN

## (undated) DEVICE — DRAPE EQUIPMENT RF WAND

## (undated) DEVICE — TIBURON SPLIT SHEET: Brand: CONVERTORS

## (undated) DEVICE — SUT SILK 2-0 30 IN A305H

## (undated) DEVICE — ANTIBACTERIAL UNDYED BRAIDED (POLYGLACTIN 910), SYNTHETIC ABSORBABLE SUTURE: Brand: COATED VICRYL

## (undated) DEVICE — VESSEL CANNULA

## (undated) DEVICE — PROVE COVER: Brand: UNBRANDED

## (undated) DEVICE — INSTRUMENT POUCH: Brand: CONVERTORS

## (undated) DEVICE — SUT SILK 4-0 30 IN A303H

## (undated) DEVICE — EMERALD TOP SHEET DRAPE: Brand: CONVERTORS

## (undated) DEVICE — SUT PROLENE 6-0 BV130 30 IN 8709H

## (undated) DEVICE — APPLIER SURGICLIP PREMIUM SMALL 9IN